# Patient Record
Sex: MALE | Race: WHITE | NOT HISPANIC OR LATINO | Employment: OTHER | URBAN - METROPOLITAN AREA
[De-identification: names, ages, dates, MRNs, and addresses within clinical notes are randomized per-mention and may not be internally consistent; named-entity substitution may affect disease eponyms.]

---

## 2017-03-31 RX ORDER — ESCITALOPRAM OXALATE 20 MG/1
TABLET ORAL
Qty: 30 TABLET | Refills: 0 | Status: SHIPPED | OUTPATIENT
Start: 2017-03-31 | End: 2017-04-27 | Stop reason: SDUPTHER

## 2017-04-27 RX ORDER — ESCITALOPRAM OXALATE 20 MG/1
TABLET ORAL
Qty: 30 TABLET | Refills: 0 | Status: SHIPPED | OUTPATIENT
Start: 2017-04-27 | End: 2017-08-22 | Stop reason: SDUPTHER

## 2017-04-27 RX ORDER — ESCITALOPRAM OXALATE 20 MG/1
TABLET ORAL
Qty: 15 TABLET | Refills: 0 | Status: SHIPPED | OUTPATIENT
Start: 2017-04-27 | End: 2017-04-27 | Stop reason: SDUPTHER

## 2017-08-01 ENCOUNTER — TELEPHONE (OUTPATIENT)
Dept: FAMILY MEDICINE CLINIC | Facility: CLINIC | Age: 77
End: 2017-08-01

## 2017-08-01 NOTE — TELEPHONE ENCOUNTER
Pt says he received bill for $100+ and says billing dept cannot help him. Iesha gave him the number earlier and pt called back saying they told him we have to file with medicare.    I tried to explain to him that if the billing dept could not help,he would need to speak with you.     [phone]

## 2017-08-22 RX ORDER — ESCITALOPRAM OXALATE 20 MG/1
TABLET ORAL
Qty: 15 TABLET | Refills: 0 | Status: SHIPPED | OUTPATIENT
Start: 2017-08-22 | End: 2017-09-28 | Stop reason: SDUPTHER

## 2017-09-28 RX ORDER — ESCITALOPRAM OXALATE 20 MG/1
TABLET ORAL
Qty: 5 TABLET | Refills: 0 | Status: SHIPPED | OUTPATIENT
Start: 2017-09-28 | End: 2017-10-04 | Stop reason: SDUPTHER

## 2017-10-02 RX ORDER — LISINOPRIL 20 MG/1
TABLET ORAL
Qty: 30 TABLET | Refills: 0 | Status: SHIPPED | OUTPATIENT
Start: 2017-10-02 | End: 2017-10-04 | Stop reason: SDUPTHER

## 2017-10-04 ENCOUNTER — OFFICE VISIT (OUTPATIENT)
Dept: FAMILY MEDICINE CLINIC | Facility: CLINIC | Age: 77
End: 2017-10-04

## 2017-10-04 VITALS
RESPIRATION RATE: 15 BRPM | TEMPERATURE: 98.4 F | WEIGHT: 186 LBS | BODY MASS INDEX: 26.63 KG/M2 | HEART RATE: 69 BPM | SYSTOLIC BLOOD PRESSURE: 118 MMHG | DIASTOLIC BLOOD PRESSURE: 60 MMHG | HEIGHT: 70 IN

## 2017-10-04 DIAGNOSIS — I10 BENIGN ESSENTIAL HYPERTENSION: Primary | ICD-10-CM

## 2017-10-04 DIAGNOSIS — Z23 NEED FOR IMMUNIZATION AGAINST INFLUENZA: ICD-10-CM

## 2017-10-04 DIAGNOSIS — F41.9 ANXIETY DISORDER, UNSPECIFIED TYPE: ICD-10-CM

## 2017-10-04 PROCEDURE — G0008 ADMIN INFLUENZA VIRUS VAC: HCPCS | Performed by: INTERNAL MEDICINE

## 2017-10-04 PROCEDURE — 99213 OFFICE O/P EST LOW 20 MIN: CPT | Performed by: INTERNAL MEDICINE

## 2017-10-04 RX ORDER — LISINOPRIL 20 MG/1
20 TABLET ORAL DAILY
Qty: 90 TABLET | Refills: 1 | Status: SHIPPED | OUTPATIENT
Start: 2017-10-04 | End: 2018-05-02 | Stop reason: SDUPTHER

## 2017-10-04 RX ORDER — ESCITALOPRAM OXALATE 20 MG/1
20 TABLET ORAL DAILY
Qty: 90 TABLET | Refills: 1 | Status: SHIPPED | OUTPATIENT
Start: 2017-10-04 | End: 2017-12-13

## 2017-10-04 NOTE — PROGRESS NOTES
Subjective chief complaint is follow-up for blood pressure and anxiety  Jewel Best is a 77 y.o. male.     History of Present Illness Jewel is here today for follow-up.  He does have hypertension.  Typically 20 mg of lisinopril has controlled his blood pressure fairly well.  He has been cutting back on his Lexapro.  He takes a whole tablet one day and a half tablets neck.  His anxiety seems to be well-controlled and he is not requiring any lorazepam.  Well most of the time.  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  Previously he had a fistula from his gums out to the skin.  This seems to have closed.     he has had some mild hyperlipidemia in the past.  He is not on any medication for this.  He also is had some prior anemia.     The following portions of the patient's history were reviewed and updated as appropriate: allergies, current medications, past medical history and problem list.    Review of Systems   Respiratory: Negative for chest tightness and shortness of breath.    Cardiovascular: Negative for chest pain and leg swelling.   Gastrointestinal: Negative for abdominal pain.   Neurological: Negative for dizziness, light-headedness and headaches.       Objective   Physical Exam   Constitutional: He appears well-developed and well-nourished.   Neck: Carotid bruit is not present. No thyromegaly present.   Cardiovascular: Normal rate, regular rhythm, normal heart sounds and intact distal pulses.  Exam reveals no gallop and no friction rub.    No murmur heard.  Pulmonary/Chest: Effort normal and breath sounds normal. No respiratory distress. He has no wheezes. He has no rales.   Abdominal: Soft. Bowel sounds are normal. He exhibits no distension. There is no tenderness. There is no rebound and no guarding.   Musculoskeletal: He exhibits no edema.   Nursing note and vitals reviewed.      Assessment/Plan   Jewel was seen today for med management.    Diagnoses and all orders for this visit:    Benign essential  hypertension  -     Comprehensive Metabolic Panel  -     Lipid Panel  -     CBC & Differential    Anxiety disorder, unspecified type    Need for immunization against influenza  -     Flu Vaccine High Dose PF 65YR+    Other orders  -     escitalopram (LEXAPRO) 20 MG tablet; Take 1 tablet by mouth Daily.  -     lisinopril (PRINIVIL,ZESTRIL) 20 MG tablet; Take 1 tablet by mouth Daily.       Young is here today for follow-up.  We did renew his medications for 90 day supplies with refill.  We are going to give him a flu shot today and check some lab work.  We will make allowances for him not fasting.

## 2017-10-05 LAB
ALBUMIN SERPL-MCNC: 4.3 G/DL (ref 3.5–4.8)
ALBUMIN/GLOB SERPL: 1.7 {RATIO} (ref 1.2–2.2)
ALP SERPL-CCNC: 63 IU/L (ref 39–117)
ALT SERPL-CCNC: 12 IU/L (ref 0–44)
AST SERPL-CCNC: 17 IU/L (ref 0–40)
BASOPHILS # BLD AUTO: 0 X10E3/UL (ref 0–0.2)
BASOPHILS NFR BLD AUTO: 0 %
BILIRUB SERPL-MCNC: 0.4 MG/DL (ref 0–1.2)
BUN SERPL-MCNC: 17 MG/DL (ref 8–27)
BUN/CREAT SERPL: 15 (ref 10–24)
CALCIUM SERPL-MCNC: 8.9 MG/DL (ref 8.6–10.2)
CHLORIDE SERPL-SCNC: 101 MMOL/L (ref 96–106)
CHOLEST SERPL-MCNC: 190 MG/DL (ref 100–199)
CO2 SERPL-SCNC: 24 MMOL/L (ref 18–29)
CREAT SERPL-MCNC: 1.14 MG/DL (ref 0.76–1.27)
EOSINOPHIL # BLD AUTO: 0.3 X10E3/UL (ref 0–0.4)
EOSINOPHIL NFR BLD AUTO: 4 %
ERYTHROCYTE [DISTWIDTH] IN BLOOD BY AUTOMATED COUNT: 13.5 % (ref 12.3–15.4)
GLOBULIN SER CALC-MCNC: 2.5 G/DL (ref 1.5–4.5)
GLUCOSE SERPL-MCNC: 85 MG/DL (ref 65–99)
HCT VFR BLD AUTO: 35.1 % (ref 37.5–51)
HDLC SERPL-MCNC: 43 MG/DL
HGB BLD-MCNC: 11.7 G/DL (ref 12.6–17.7)
IMM GRANULOCYTES # BLD: 0 X10E3/UL (ref 0–0.1)
IMM GRANULOCYTES NFR BLD: 0 %
INTERPRETATION: NORMAL
LDLC SERPL CALC-MCNC: 111 MG/DL (ref 0–99)
LYMPHOCYTES # BLD AUTO: 2.1 X10E3/UL (ref 0.7–3.1)
LYMPHOCYTES NFR BLD AUTO: 31 %
MCH RBC QN AUTO: 30.5 PG (ref 26.6–33)
MCHC RBC AUTO-ENTMCNC: 33.3 G/DL (ref 31.5–35.7)
MCV RBC AUTO: 91 FL (ref 79–97)
MONOCYTES # BLD AUTO: 0.4 X10E3/UL (ref 0.1–0.9)
MONOCYTES NFR BLD AUTO: 6 %
NEUTROPHILS # BLD AUTO: 4.1 X10E3/UL (ref 1.4–7)
NEUTROPHILS NFR BLD AUTO: 59 %
PLATELET # BLD AUTO: 281 X10E3/UL (ref 150–379)
POTASSIUM SERPL-SCNC: 4.7 MMOL/L (ref 3.5–5.2)
PROT SERPL-MCNC: 6.8 G/DL (ref 6–8.5)
RBC # BLD AUTO: 3.84 X10E6/UL (ref 4.14–5.8)
SODIUM SERPL-SCNC: 139 MMOL/L (ref 134–144)
TRIGL SERPL-MCNC: 179 MG/DL (ref 0–149)
VLDLC SERPL CALC-MCNC: 36 MG/DL (ref 5–40)
WBC # BLD AUTO: 6.9 X10E3/UL (ref 3.4–10.8)

## 2017-10-11 ENCOUNTER — TELEPHONE (OUTPATIENT)
Dept: FAMILY MEDICINE CLINIC | Facility: CLINIC | Age: 77
End: 2017-10-11

## 2017-10-11 DIAGNOSIS — D64.9 ANEMIA, UNSPECIFIED TYPE: Primary | ICD-10-CM

## 2017-10-11 NOTE — TELEPHONE ENCOUNTER
----- Message from Stephanie Mahmood sent at 10/10/2017 10:44 AM EDT -----  PT IS RETURNING YOUR CALL, [phone]  Results were given to the patient.  I did advise repeating his blood count in one month.

## 2017-11-14 DIAGNOSIS — D64.9 CHRONIC ANEMIA: Primary | ICD-10-CM

## 2017-11-14 LAB
BASOPHILS # BLD AUTO: 0.04 10*3/MM3 (ref 0–0.2)
BASOPHILS NFR BLD AUTO: 0.5 % (ref 0–1.5)
EOSINOPHIL # BLD AUTO: 0.41 10*3/MM3 (ref 0–0.7)
EOSINOPHIL NFR BLD AUTO: 5.1 % (ref 0.3–6.2)
ERYTHROCYTE [DISTWIDTH] IN BLOOD BY AUTOMATED COUNT: 13.4 % (ref 11.5–14.5)
FERRITIN SERPL-MCNC: 300.1 NG/ML (ref 30–400)
HCT VFR BLD AUTO: 38.1 % (ref 40.4–52.2)
HGB BLD-MCNC: 12.3 G/DL (ref 13.7–17.6)
IMM GRANULOCYTES # BLD: 0.02 10*3/MM3 (ref 0–0.03)
IMM GRANULOCYTES NFR BLD: 0.2 % (ref 0–0.5)
IRON SATN MFR SERPL: 26 % (ref 20–50)
IRON SERPL-MCNC: 98 MCG/DL (ref 59–158)
LYMPHOCYTES # BLD AUTO: 2.84 10*3/MM3 (ref 0.9–4.8)
LYMPHOCYTES NFR BLD AUTO: 35.3 % (ref 19.6–45.3)
MCH RBC QN AUTO: 31.4 PG (ref 27–32.7)
MCHC RBC AUTO-ENTMCNC: 32.3 G/DL (ref 32.6–36.4)
MCV RBC AUTO: 97.2 FL (ref 79.8–96.2)
MONOCYTES # BLD AUTO: 0.55 10*3/MM3 (ref 0.2–1.2)
MONOCYTES NFR BLD AUTO: 6.8 % (ref 5–12)
NEUTROPHILS # BLD AUTO: 4.18 10*3/MM3 (ref 1.9–8.1)
NEUTROPHILS NFR BLD AUTO: 52.1 % (ref 42.7–76)
PLATELET # BLD AUTO: 293 10*3/MM3 (ref 140–500)
RBC # BLD AUTO: 3.92 10*6/MM3 (ref 4.6–6)
TIBC SERPL-MCNC: 376 MCG/DL
UIBC SERPL-MCNC: 278 MCG/DL
VIT B12 SERPL-MCNC: 231 PG/ML (ref 211–946)
WBC # BLD AUTO: 8.04 10*3/MM3 (ref 4.5–10.7)

## 2017-11-15 ENCOUNTER — RESULTS ENCOUNTER (OUTPATIENT)
Dept: FAMILY MEDICINE CLINIC | Facility: CLINIC | Age: 77
End: 2017-11-15

## 2017-11-15 DIAGNOSIS — D64.9 ANEMIA, UNSPECIFIED TYPE: ICD-10-CM

## 2017-12-13 ENCOUNTER — OFFICE VISIT (OUTPATIENT)
Dept: GASTROENTEROLOGY | Facility: CLINIC | Age: 77
End: 2017-12-13

## 2017-12-13 VITALS
SYSTOLIC BLOOD PRESSURE: 116 MMHG | HEIGHT: 71 IN | WEIGHT: 192 LBS | BODY MASS INDEX: 26.88 KG/M2 | DIASTOLIC BLOOD PRESSURE: 66 MMHG

## 2017-12-13 DIAGNOSIS — D64.9 ANEMIA, UNSPECIFIED TYPE: Primary | ICD-10-CM

## 2017-12-13 PROCEDURE — 99213 OFFICE O/P EST LOW 20 MIN: CPT | Performed by: INTERNAL MEDICINE

## 2017-12-13 NOTE — PROGRESS NOTES
Chief Complaint   Patient presents with   • Anemia        Jewel Best is a  77 y.o. male here for a follow up visit for Anemia    HPI this 77-year-old white male patient of Dr. Jose Cruz Panda was last seen in this office October 2015.  At that time he had episodes of hematemesis and upper endoscopy had revealed gastric polyps with evidence of bleeding.  Polyps were removed and his symptoms subsided.  He had carried a history of anemia at that point in time.  Review of recent labs reflects very mild anemia, iron studies are within normal limits.  The patient believes he was referred at this time for screening evaluation of his colon.  We discussed screening for colorectal cancer and he has no high risk issues, specifically, family history is negative for colorectal cancer and also negative for history of polyps.  He denies any melena or bright red blood per rectum.  I reviewed current standards for screening purposes.  He wishes to discuss this further with his primary care tender before considering colonoscopic evaluation for screening purposes.    Past Medical History:   Diagnosis Date   • Anemia    • Anxiety    • Bladder cancer    • Hyperlipidemia    • Hypertension    • Lymphoma        Current Outpatient Prescriptions   Medication Sig Dispense Refill   • lisinopril (PRINIVIL,ZESTRIL) 20 MG tablet Take 1 tablet by mouth Daily. 90 tablet 1     No current facility-administered medications for this visit.        PRN Meds:.    No Known Allergies    Social History     Social History   • Marital status:      Spouse name: N/A   • Number of children: N/A   • Years of education: N/A     Occupational History   • Not on file.     Social History Main Topics   • Smoking status: Former Smoker     Quit date: 12/13/1971   • Smokeless tobacco: Never Used   • Alcohol use No   • Drug use: Not on file   • Sexual activity: Not on file     Other Topics Concern   • Not on file     Social History Narrative   • No narrative on  file       Family History   Problem Relation Age of Onset   • Family history unknown: Yes       Review of Systems   Constitutional: Negative for activity change, appetite change, fatigue and unexpected weight change.   HENT: Negative for congestion, facial swelling, sore throat, trouble swallowing and voice change.    Eyes: Negative for photophobia and visual disturbance.   Respiratory: Negative for cough and choking.    Cardiovascular: Negative for chest pain.   Gastrointestinal: Negative for abdominal distention, abdominal pain, anal bleeding, blood in stool, constipation, diarrhea, nausea, rectal pain and vomiting.   Endocrine: Negative for polyphagia.   Musculoskeletal: Negative for arthralgias, gait problem and joint swelling.   Skin: Negative for color change, pallor and rash.   Allergic/Immunologic: Negative for food allergies.   Neurological: Negative for speech difficulty and headaches.   Hematological: Does not bruise/bleed easily.   Psychiatric/Behavioral: Negative for agitation, confusion and sleep disturbance.       Vitals:    12/13/17 0758   BP: 116/66       Physical Exam   Constitutional: He is oriented to person, place, and time. He appears well-developed and well-nourished.   HENT:   Head: Normocephalic.   Mouth/Throat: Oropharynx is clear and moist.   Eyes: Conjunctivae and EOM are normal.   Neck: Normal range of motion.   Cardiovascular: Normal rate and regular rhythm.    Pulmonary/Chest: Breath sounds normal.   Abdominal: Soft. Bowel sounds are normal.   Musculoskeletal: Normal range of motion.   Neurological: He is alert and oriented to person, place, and time.   Skin: Skin is warm and dry.   Psychiatric: He has a normal mood and affect. His behavior is normal.       ASSESSMENT   #1 anemia: Static with normal iron indices      PLAN  Patient to discuss screening evaluation with his primary care tender, will call if he wishes to proceed.  Otherwise can follow-up as needed for any GI related  complaints      ICD-10-CM ICD-9-CM   1. Anemia, unspecified type D64.9 285.9

## 2018-05-02 RX ORDER — LISINOPRIL 20 MG/1
20 TABLET ORAL DAILY
Qty: 30 TABLET | Refills: 0 | Status: SHIPPED | OUTPATIENT
Start: 2018-05-02 | End: 2018-05-30 | Stop reason: SDUPTHER

## 2018-05-30 ENCOUNTER — OFFICE VISIT (OUTPATIENT)
Dept: FAMILY MEDICINE CLINIC | Facility: CLINIC | Age: 78
End: 2018-05-30

## 2018-05-30 VITALS
RESPIRATION RATE: 16 BRPM | BODY MASS INDEX: 26.4 KG/M2 | WEIGHT: 188.6 LBS | DIASTOLIC BLOOD PRESSURE: 70 MMHG | HEART RATE: 61 BPM | OXYGEN SATURATION: 97 % | HEIGHT: 71 IN | SYSTOLIC BLOOD PRESSURE: 138 MMHG | TEMPERATURE: 98.5 F

## 2018-05-30 DIAGNOSIS — I10 BENIGN ESSENTIAL HYPERTENSION: Primary | ICD-10-CM

## 2018-05-30 DIAGNOSIS — E78.49 OTHER HYPERLIPIDEMIA: ICD-10-CM

## 2018-05-30 LAB
ALBUMIN SERPL-MCNC: 4.4 G/DL (ref 3.5–5.2)
ALBUMIN/GLOB SERPL: 1.7 G/DL
ALP SERPL-CCNC: 67 U/L (ref 39–117)
ALT SERPL-CCNC: 11 U/L (ref 1–41)
AST SERPL-CCNC: 11 U/L (ref 1–40)
BASOPHILS # BLD AUTO: 0.02 10*3/MM3 (ref 0–0.2)
BASOPHILS NFR BLD AUTO: 0.3 % (ref 0–1.5)
BILIRUB SERPL-MCNC: 0.4 MG/DL (ref 0.1–1.2)
BUN SERPL-MCNC: 18 MG/DL (ref 8–23)
BUN/CREAT SERPL: 14.8 (ref 7–25)
CALCIUM SERPL-MCNC: 9.5 MG/DL (ref 8.6–10.5)
CHLORIDE SERPL-SCNC: 103 MMOL/L (ref 98–107)
CHOLEST SERPL-MCNC: 197 MG/DL (ref 0–200)
CO2 SERPL-SCNC: 26.7 MMOL/L (ref 22–29)
CREAT SERPL-MCNC: 1.22 MG/DL (ref 0.76–1.27)
EOSINOPHIL # BLD AUTO: 0.33 10*3/MM3 (ref 0–0.7)
EOSINOPHIL NFR BLD AUTO: 4.5 % (ref 0.3–6.2)
ERYTHROCYTE [DISTWIDTH] IN BLOOD BY AUTOMATED COUNT: 12.9 % (ref 11.5–14.5)
GFR SERPLBLD CREATININE-BSD FMLA CKD-EPI: 57 ML/MIN/1.73
GFR SERPLBLD CREATININE-BSD FMLA CKD-EPI: 69 ML/MIN/1.73
GLOBULIN SER CALC-MCNC: 2.6 GM/DL
GLUCOSE SERPL-MCNC: 100 MG/DL (ref 65–99)
HCT VFR BLD AUTO: 38.3 % (ref 40.4–52.2)
HDLC SERPL-MCNC: 43 MG/DL (ref 40–60)
HGB BLD-MCNC: 12 G/DL (ref 13.7–17.6)
IMM GRANULOCYTES # BLD: 0 10*3/MM3 (ref 0–0.03)
IMM GRANULOCYTES NFR BLD: 0 % (ref 0–0.5)
LDLC SERPL CALC-MCNC: 131 MG/DL (ref 0–100)
LYMPHOCYTES # BLD AUTO: 2.3 10*3/MM3 (ref 0.9–4.8)
LYMPHOCYTES NFR BLD AUTO: 31.6 % (ref 19.6–45.3)
MCH RBC QN AUTO: 30.5 PG (ref 27–32.7)
MCHC RBC AUTO-ENTMCNC: 31.3 G/DL (ref 32.6–36.4)
MCV RBC AUTO: 97.2 FL (ref 79.8–96.2)
MONOCYTES # BLD AUTO: 0.55 10*3/MM3 (ref 0.2–1.2)
MONOCYTES NFR BLD AUTO: 7.5 % (ref 5–12)
NEUTROPHILS # BLD AUTO: 4.09 10*3/MM3 (ref 1.9–8.1)
NEUTROPHILS NFR BLD AUTO: 56.1 % (ref 42.7–76)
PLATELET # BLD AUTO: 311 10*3/MM3 (ref 140–500)
POTASSIUM SERPL-SCNC: 5 MMOL/L (ref 3.5–5.2)
PROT SERPL-MCNC: 7 G/DL (ref 6–8.5)
RBC # BLD AUTO: 3.94 10*6/MM3 (ref 4.6–6)
SODIUM SERPL-SCNC: 142 MMOL/L (ref 136–145)
TRIGL SERPL-MCNC: 113 MG/DL (ref 0–150)
VLDLC SERPL CALC-MCNC: 22.6 MG/DL (ref 5–40)
WBC # BLD AUTO: 7.29 10*3/MM3 (ref 4.5–10.7)

## 2018-05-30 PROCEDURE — 99213 OFFICE O/P EST LOW 20 MIN: CPT | Performed by: INTERNAL MEDICINE

## 2018-05-30 RX ORDER — LISINOPRIL 20 MG/1
20 TABLET ORAL DAILY
Qty: 90 TABLET | Refills: 1 | Status: SHIPPED | OUTPATIENT
Start: 2018-05-30 | End: 2018-12-03 | Stop reason: SDUPTHER

## 2018-05-30 RX ORDER — ESCITALOPRAM OXALATE 20 MG/1
20 TABLET ORAL DAILY
Qty: 90 TABLET | Refills: 1 | Status: SHIPPED | OUTPATIENT
Start: 2018-05-30 | End: 2018-12-03 | Stop reason: SDUPTHER

## 2018-05-30 RX ORDER — ESCITALOPRAM OXALATE 20 MG/1
20 TABLET ORAL DAILY
Refills: 1 | COMMUNITY
Start: 2018-02-28 | End: 2018-05-30 | Stop reason: SDUPTHER

## 2018-05-30 NOTE — PROGRESS NOTES
Subjective chief complaint is medication refill for blood pressure  Jewel Best is a 77 y.o. male.     History of Present Illness   Jewel is here today for checkup on his blood pressure.  He does take lisinopril 20 mg daily.  His blood pressure has been adequately controlled with this.  He has had some mild hyperlipidemia but has chosen not to take any medications for this.  He does continue take Lexapro for depression and anxiety over his previous health conditions.  He reports no new problems today.  The following portions of the patient's history were reviewed and updated as appropriate: allergies, current medications, past medical history and problem list.    Review of Systems   Constitutional: Negative for activity change and appetite change.   Respiratory: Negative for chest tightness and shortness of breath.    Cardiovascular: Negative for chest pain.   Gastrointestinal: Negative for abdominal pain and blood in stool.   Neurological: Negative for dizziness, light-headedness and headache.       Objective   Physical Exam   Constitutional: He appears well-developed and well-nourished.   Neck: Carotid bruit is not present.   Cardiovascular: Normal rate, regular rhythm, normal heart sounds and intact distal pulses.  Exam reveals no gallop and no friction rub.    No murmur heard.  Pulmonary/Chest: Effort normal and breath sounds normal. No respiratory distress. He has no wheezes. He has no rales.   Abdominal: Soft. Bowel sounds are normal. He exhibits no distension and no mass. There is no tenderness. There is no guarding.   Musculoskeletal: He exhibits no edema.   Nursing note and vitals reviewed.        Assessment/Plan   Jewel was seen today for med refill.    Diagnoses and all orders for this visit:    Benign essential hypertension  -     CBC & Differential  -     Comprehensive Metabolic Panel    Other hyperlipidemia  -     Lipid Panel  -     Comprehensive Metabolic Panel    Other orders  -     lisinopril  (PRINIVIL,ZESTRIL) 20 MG tablet; Take 1 tablet by mouth Daily.  -     escitalopram (LEXAPRO) 20 MG tablet; Take 1 tablet by mouth Daily.        Young is here today for follow-up.  His blood pressure seems to be doing well.  We are going to check some appropriate lab work here today.  His medications have been renewed.  If all is well we will see him back in 6 months.

## 2018-07-30 ENCOUNTER — HOSPITAL ENCOUNTER (EMERGENCY)
Facility: HOSPITAL | Age: 78
Discharge: HOME | End: 2018-07-30
Attending: EMERGENCY MEDICINE | Admitting: EMERGENCY MEDICINE

## 2018-07-30 VITALS
SYSTOLIC BLOOD PRESSURE: 163 MMHG | HEIGHT: 71 IN | TEMPERATURE: 97.3 F | RESPIRATION RATE: 18 BRPM | WEIGHT: 180 LBS | HEART RATE: 67 BPM | OXYGEN SATURATION: 98 % | DIASTOLIC BLOOD PRESSURE: 71 MMHG | BODY MASS INDEX: 25.2 KG/M2
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
## 2018-07-30 DIAGNOSIS — S61.011A LACERATION OF RIGHT THUMB WITHOUT FOREIGN BODY, NAIL DAMAGE STATUS UNSPECIFIED, INITIAL ENCOUNTER: Primary | ICD-10-CM

## 2018-07-30 PROCEDURE — 90471 IMMUNIZATION ADMIN: CPT | Performed by: PHYSICIAN ASSISTANT

## 2018-07-30 PROCEDURE — 25010000002 TDAP 5-2.5-18.5 LF-MCG/0.5 SUSPENSION: Performed by: PHYSICIAN ASSISTANT

## 2018-07-30 PROCEDURE — 90715 TDAP VACCINE 7 YRS/> IM: CPT | Performed by: PHYSICIAN ASSISTANT

## 2018-07-30 PROCEDURE — 99282 EMERGENCY DEPT VISIT SF MDM: CPT

## 2018-07-30 RX ORDER — LIDOCAINE HYDROCHLORIDE 10 MG/ML
10 INJECTION, SOLUTION INFILTRATION; PERINEURAL ONCE
Status: COMPLETED | OUTPATIENT
Start: 2018-07-30 | End: 2018-07-30

## 2018-07-30 RX ADMIN — TETANUS TOXOID, REDUCED DIPHTHERIA TOXOID AND ACELLULAR PERTUSSIS VACCINE, ADSORBED 0.5 ML: 5; 2.5; 8; 8; 2.5 SUSPENSION INTRAMUSCULAR at 21:48

## 2018-07-30 RX ADMIN — LIDOCAINE HYDROCHLORIDE 10 ML: 10 INJECTION, SOLUTION INFILTRATION; PERINEURAL at 21:47

## 2018-07-31 ENCOUNTER — EPISODE CHANGES (OUTPATIENT)
Dept: CASE MANAGEMENT | Facility: OTHER | Age: 78
End: 2018-07-31

## 2018-08-01 ENCOUNTER — TELEPHONE (OUTPATIENT)
Dept: SOCIAL WORK | Facility: HOSPITAL | Age: 78
End: 2018-08-01

## 2018-08-01 NOTE — TELEPHONE ENCOUNTER
ED f/u phone call: states that laceration has no signs of infection, and he is following d/c instructions. Per question, advised to clean and apply antibx ointment as directed until sutures removed ten days after visit. No questions/concerns

## 2018-10-10 ENCOUNTER — EPISODE CHANGES (OUTPATIENT)
Dept: CASE MANAGEMENT | Facility: OTHER | Age: 78
End: 2018-10-10

## 2018-12-03 RX ORDER — LISINOPRIL 20 MG/1
20 TABLET ORAL DAILY
Qty: 90 TABLET | Refills: 0 | Status: SHIPPED | OUTPATIENT
Start: 2018-12-03 | End: 2019-03-20 | Stop reason: SDUPTHER

## 2018-12-03 RX ORDER — ESCITALOPRAM OXALATE 20 MG/1
20 TABLET ORAL DAILY
Qty: 90 TABLET | Refills: 1 | Status: SHIPPED | OUTPATIENT
Start: 2018-12-03 | End: 2019-05-29 | Stop reason: SDUPTHER

## 2019-03-20 RX ORDER — LISINOPRIL 20 MG/1
20 TABLET ORAL DAILY
Qty: 90 TABLET | Refills: 0 | Status: SHIPPED | OUTPATIENT
Start: 2019-03-20 | End: 2019-03-21 | Stop reason: SDUPTHER

## 2019-03-21 ENCOUNTER — OFFICE VISIT (OUTPATIENT)
Dept: FAMILY MEDICINE CLINIC | Facility: CLINIC | Age: 79
End: 2019-03-21

## 2019-03-21 VITALS
OXYGEN SATURATION: 98 % | WEIGHT: 190.6 LBS | HEIGHT: 71 IN | HEART RATE: 77 BPM | SYSTOLIC BLOOD PRESSURE: 120 MMHG | DIASTOLIC BLOOD PRESSURE: 80 MMHG | BODY MASS INDEX: 26.68 KG/M2 | TEMPERATURE: 97.8 F

## 2019-03-21 DIAGNOSIS — I10 BENIGN ESSENTIAL HYPERTENSION: Primary | ICD-10-CM

## 2019-03-21 DIAGNOSIS — F41.9 ANXIETY DISORDER, UNSPECIFIED TYPE: ICD-10-CM

## 2019-03-21 DIAGNOSIS — D64.9 ANEMIA, UNSPECIFIED TYPE: ICD-10-CM

## 2019-03-21 PROCEDURE — 99213 OFFICE O/P EST LOW 20 MIN: CPT | Performed by: INTERNAL MEDICINE

## 2019-03-21 RX ORDER — LISINOPRIL 20 MG/1
20 TABLET ORAL DAILY
Qty: 90 TABLET | Refills: 1 | Status: SHIPPED | OUTPATIENT
Start: 2019-03-21 | End: 2019-05-29 | Stop reason: SDUPTHER

## 2019-03-21 NOTE — PROGRESS NOTES
Subjective Plan is follow-up on hypertension and anxiety  Jewel Best is a 78 y.o. male.     History of Present Illness   Young is here today for follow-up.  He does have hypertension.  He is on 20 mg of lisinopril.  He reports that his blood pressures at home vary from 113 systolic to 165 systolic.  The diastolic normally stays about the same.  He is not having any symptoms from this.  He does have some anxiety.  He is wondering whether he still needs the 20 mg of Lexapro per day.  He does have some leftover lorazepam from 4 years ago that he takes now and then.  The following portions of the patient's history were reviewed and updated as appropriate: allergies, current medications, past family history, past medical history, past social history, past surgical history and problem list.    Review of Systems   Respiratory: Negative for chest tightness and shortness of breath.    Cardiovascular: Negative for chest pain.   Psychiatric/Behavioral: The patient is nervous/anxious.        Objective   Physical Exam   Constitutional: He appears well-developed and well-nourished.   Neck: Carotid bruit is not present.   Cardiovascular: Normal rate, regular rhythm, normal heart sounds and intact distal pulses. Exam reveals no gallop and no friction rub.   No murmur heard.  Pulmonary/Chest: Effort normal and breath sounds normal. No respiratory distress. He has no wheezes. He has no rales.   Abdominal: Soft. Bowel sounds are normal. He exhibits no distension and no mass. There is no tenderness. There is no guarding.   Musculoskeletal: He exhibits no edema.   Nursing note and vitals reviewed.        Assessment/Plan   Jewel was seen today for hypertension and med refill.    Diagnoses and all orders for this visit:    Benign essential hypertension  -     Comprehensive Metabolic Panel    Anxiety disorder, unspecified type    Anemia, unspecified type  -     CBC & Differential    Other orders  -     lisinopril (PRINIVIL,ZESTRIL)  20 MG tablet; Take 1 tablet by mouth Daily. NEEDS APPT    Jewel is here today for follow-up.  We are going to check some laboratories to look at his creatinine and also make sure his anemia is stable.  If all is well we can see him back in 6 months.

## 2019-03-22 LAB
ALBUMIN SERPL-MCNC: 4.5 G/DL (ref 3.5–4.7)
ALBUMIN/GLOB SERPL: 1.7 {RATIO} (ref 1.2–2.2)
ALP SERPL-CCNC: 69 IU/L (ref 39–117)
ALT SERPL-CCNC: 13 IU/L (ref 0–44)
AST SERPL-CCNC: 17 IU/L (ref 0–40)
BASOPHILS # BLD AUTO: 0 X10E3/UL (ref 0–0.2)
BASOPHILS NFR BLD AUTO: 0 %
BILIRUB SERPL-MCNC: 0.3 MG/DL (ref 0–1.2)
BUN SERPL-MCNC: 19 MG/DL (ref 8–27)
BUN/CREAT SERPL: 16 (ref 10–24)
CALCIUM SERPL-MCNC: 9.2 MG/DL (ref 8.6–10.2)
CHLORIDE SERPL-SCNC: 104 MMOL/L (ref 96–106)
CO2 SERPL-SCNC: 24 MMOL/L (ref 20–29)
CREAT SERPL-MCNC: 1.16 MG/DL (ref 0.76–1.27)
EOSINOPHIL # BLD AUTO: 0.3 X10E3/UL (ref 0–0.4)
EOSINOPHIL NFR BLD AUTO: 4 %
ERYTHROCYTE [DISTWIDTH] IN BLOOD BY AUTOMATED COUNT: 13.5 % (ref 12.3–15.4)
GLOBULIN SER CALC-MCNC: 2.6 G/DL (ref 1.5–4.5)
GLUCOSE SERPL-MCNC: 133 MG/DL (ref 65–99)
HCT VFR BLD AUTO: 36.2 % (ref 37.5–51)
HGB BLD-MCNC: 12.2 G/DL (ref 13–17.7)
IMM GRANULOCYTES # BLD AUTO: 0 X10E3/UL (ref 0–0.1)
IMM GRANULOCYTES NFR BLD AUTO: 0 %
LYMPHOCYTES # BLD AUTO: 2.4 X10E3/UL (ref 0.7–3.1)
LYMPHOCYTES NFR BLD AUTO: 28 %
MCH RBC QN AUTO: 30.4 PG (ref 26.6–33)
MCHC RBC AUTO-ENTMCNC: 33.7 G/DL (ref 31.5–35.7)
MCV RBC AUTO: 90 FL (ref 79–97)
MONOCYTES # BLD AUTO: 0.5 X10E3/UL (ref 0.1–0.9)
MONOCYTES NFR BLD AUTO: 6 %
NEUTROPHILS # BLD AUTO: 5.1 X10E3/UL (ref 1.4–7)
NEUTROPHILS NFR BLD AUTO: 62 %
PLATELET # BLD AUTO: 308 X10E3/UL (ref 150–379)
POTASSIUM SERPL-SCNC: 4.2 MMOL/L (ref 3.5–5.2)
PROT SERPL-MCNC: 7.1 G/DL (ref 6–8.5)
RBC # BLD AUTO: 4.01 X10E6/UL (ref 4.14–5.8)
SODIUM SERPL-SCNC: 144 MMOL/L (ref 134–144)
WBC # BLD AUTO: 8.3 X10E3/UL (ref 3.4–10.8)

## 2019-05-29 ENCOUNTER — OFFICE VISIT (OUTPATIENT)
Dept: FAMILY MEDICINE CLINIC | Facility: CLINIC | Age: 79
End: 2019-05-29

## 2019-05-29 VITALS
DIASTOLIC BLOOD PRESSURE: 82 MMHG | HEART RATE: 67 BPM | WEIGHT: 182.2 LBS | TEMPERATURE: 97.7 F | OXYGEN SATURATION: 97 % | BODY MASS INDEX: 25.51 KG/M2 | HEIGHT: 71 IN | SYSTOLIC BLOOD PRESSURE: 146 MMHG

## 2019-05-29 DIAGNOSIS — F41.9 ANXIETY DISORDER, UNSPECIFIED TYPE: Primary | ICD-10-CM

## 2019-05-29 PROCEDURE — 99214 OFFICE O/P EST MOD 30 MIN: CPT | Performed by: INTERNAL MEDICINE

## 2019-05-29 RX ORDER — LISINOPRIL 20 MG/1
20 TABLET ORAL DAILY
Qty: 90 TABLET | Refills: 1 | Status: SHIPPED | OUTPATIENT
Start: 2019-05-29 | End: 2020-08-10

## 2019-05-29 RX ORDER — ESCITALOPRAM OXALATE 20 MG/1
20 TABLET ORAL DAILY
Qty: 90 TABLET | Refills: 1 | Status: SHIPPED | OUTPATIENT
Start: 2019-05-29 | End: 2019-12-09 | Stop reason: SDUPTHER

## 2019-05-29 RX ORDER — BUSPIRONE HYDROCHLORIDE 5 MG/1
5 TABLET ORAL 3 TIMES DAILY
Qty: 90 TABLET | Refills: 5 | Status: SHIPPED | OUTPATIENT
Start: 2019-05-29 | End: 2019-05-29

## 2019-05-29 RX ORDER — BUSPIRONE HYDROCHLORIDE 5 MG/1
5 TABLET ORAL 3 TIMES DAILY
Qty: 90 TABLET | Refills: 5 | Status: SHIPPED | OUTPATIENT
Start: 2019-05-29 | End: 2020-08-10

## 2019-05-29 NOTE — PROGRESS NOTES
Subjective Chief complaint is anxiety  Jewel Best is a 78 y.o. male.     History of Present Illness   Jewel is here today for complaints of anxiety for several weeks.  He is already on some Lexapro which tends to help with his depression but is not helping to curtail his anxiety.  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  He does not want to sell the business because he lives in the space above it.  The business and space are paid for.  He has lived there for 25 years.  Past medical history is remarkable for hypertension which is currently well controlled.  The following portions of the patient's history were reviewed and updated as appropriate: allergies, current medications, past family history, past medical history, past social history, past surgical history and problem list.    Review of Systems   Respiratory: Negative for chest tightness and shortness of breath.    Cardiovascular: Negative for chest pain.   Psychiatric/Behavioral: The patient is nervous/anxious.        Objective   Physical Exam   Constitutional: He is oriented to person, place, and time. He appears well-developed and well-nourished.   Cardiovascular: Normal rate, regular rhythm and normal heart sounds.   Pulmonary/Chest: Effort normal and breath sounds normal.   Musculoskeletal: He exhibits no edema.   Neurological: He is alert and oriented to person, place, and time.   Psychiatric: He has a normal mood and affect.   Nursing note and vitals reviewed.        Assessment/Plan   Jewel was seen today for depression and med refill.    Diagnoses and all orders for this visit:    Anxiety disorder, unspecified type    Other orders  -     escitalopram (LEXAPRO) 20 MG tablet; Take 1 tablet by mouth Daily.  -     Discontinue: busPIRone (BUSPAR) 5 MG tablet; Take 1 tablet by mouth 3 (Three) Times a Day.  -     busPIRone (BUSPAR) 5 MG tablet; Take 1 tablet by mouth 3 (Three)  Times a Day.      Jewel is here today for increased anxiety.  I am going to add some BuSpar to his Lexapro.  We will have him call in 2 weeks and let me know how he is doing.

## 2019-12-10 RX ORDER — ESCITALOPRAM OXALATE 20 MG/1
20 TABLET ORAL DAILY
Qty: 90 TABLET | Refills: 0 | Status: SHIPPED | OUTPATIENT
Start: 2019-12-10 | End: 2020-03-16

## 2019-12-16 ENCOUNTER — OFFICE VISIT (OUTPATIENT)
Dept: FAMILY MEDICINE CLINIC | Facility: CLINIC | Age: 79
End: 2019-12-16

## 2019-12-16 VITALS
BODY MASS INDEX: 27.3 KG/M2 | DIASTOLIC BLOOD PRESSURE: 70 MMHG | WEIGHT: 195 LBS | TEMPERATURE: 97.5 F | HEIGHT: 71 IN | SYSTOLIC BLOOD PRESSURE: 120 MMHG | OXYGEN SATURATION: 98 % | HEART RATE: 78 BPM

## 2019-12-16 DIAGNOSIS — L60.0 INGROWN NAIL OF GREAT TOE OF RIGHT FOOT: Primary | ICD-10-CM

## 2019-12-16 PROCEDURE — 99213 OFFICE O/P EST LOW 20 MIN: CPT | Performed by: INTERNAL MEDICINE

## 2019-12-16 NOTE — PROGRESS NOTES
Subjective Complaint is a sore great toe  Jewel Best is a 79 y.o. male.     History of Present Illness   Jewel is here today for sore great toe.  This been present for approximately 3 weeks.  The pain is not associated with the joint.  It is at the base of the nailbed.  It is worse when he is walking.  He does walk about 6 miles per day.  The following portions of the patient's history were reviewed and updated as appropriate: allergies, current medications, past family history, past medical history, past social history, past surgical history and problem list.    Review of Systems   Constitutional: Negative for chills and fever.   Skin: Positive for color change.       Objective   Physical Exam   Cardiovascular: Intact distal pulses.   Skin:   There is a onychomycotic ingrown nail at the right great toe.  There is some erythema at the cuticle.  There is no palpable fluctuance.  The PIP and MTP joint are not tender.   Nursing note and vitals reviewed.        Assessment/Plan   Jewel was seen today for pain.    Diagnoses and all orders for this visit:    Ingrown nail of great toe of right foot  -     Ambulatory Referral to Podiatry    Jewel is here today for pain in his toe.  I believe this is due to an ingrown toenail.  This nail is also mycotic.  I did advise referral to a podiatrist.

## 2020-03-16 RX ORDER — ESCITALOPRAM OXALATE 20 MG/1
20 TABLET ORAL DAILY
Qty: 90 TABLET | Refills: 0 | Status: SHIPPED | OUTPATIENT
Start: 2020-03-16 | End: 2020-06-24

## 2020-06-24 RX ORDER — ESCITALOPRAM OXALATE 20 MG/1
20 TABLET ORAL DAILY
Qty: 90 TABLET | Refills: 0 | Status: SHIPPED | OUTPATIENT
Start: 2020-06-24 | End: 2020-08-10 | Stop reason: SDUPTHER

## 2020-08-10 ENCOUNTER — OFFICE VISIT (OUTPATIENT)
Dept: FAMILY MEDICINE CLINIC | Facility: CLINIC | Age: 80
End: 2020-08-10

## 2020-08-10 VITALS
OXYGEN SATURATION: 97 % | TEMPERATURE: 99.1 F | WEIGHT: 193 LBS | DIASTOLIC BLOOD PRESSURE: 80 MMHG | HEIGHT: 71 IN | SYSTOLIC BLOOD PRESSURE: 140 MMHG | HEART RATE: 75 BPM | BODY MASS INDEX: 27.02 KG/M2

## 2020-08-10 DIAGNOSIS — H92.01 RIGHT EAR PAIN: Primary | ICD-10-CM

## 2020-08-10 PROCEDURE — 99214 OFFICE O/P EST MOD 30 MIN: CPT | Performed by: INTERNAL MEDICINE

## 2020-08-10 RX ORDER — ESCITALOPRAM OXALATE 20 MG/1
20 TABLET ORAL DAILY
Qty: 90 TABLET | Refills: 1 | Status: SHIPPED | OUTPATIENT
Start: 2020-08-10 | End: 2020-09-25 | Stop reason: SDUPTHER

## 2020-08-10 NOTE — PROGRESS NOTES
Subjective  Answers for HPI/ROS submitted by the patient on 8/8/2020   Ear pain  What is the primary reason for your visit?: Ear Pain    Jewel Best is a 80 y.o. male.  Chief complaint is right ear pain  History of Present Illness Jewel is here today for right ear pain.  He has been having this on and off for a few months.  He is not having fever or chills.  It is not necessarily painful to bite.  The worst the pain is been has been an 8 on a scale of 10.  Sometimes Advil helps.  Tylenol does not help.  It has not affected his hearing.  He is not having ringing in the ears.  He is not dizzy.    The following portions of the patient's history were reviewed and updated as appropriate: allergies, current medications, past family history, past medical history, past social history, past surgical history and problem list.    Review of Systems   HENT: Positive for ear pain. Negative for ear discharge, hearing loss, rhinorrhea and sore throat.    Respiratory: Negative for cough.    Gastrointestinal: Negative for abdominal pain, diarrhea and vomiting.   Musculoskeletal: Negative for neck pain.   Skin: Negative for rash.       Objective   Physical Exam   Constitutional: He appears well-developed and well-nourished.   HENT:   The right tympanic membrane is normal.  The external auditory canal is clear.  The TMJ is not tender when palpated through the canal.  There is some bilateral nasal congestion but no significant erythema.  Oropharynx is clear.   Lymphadenopathy:     He has no cervical adenopathy.   Nursing note and vitals reviewed.        Assessment/Plan   Jewel was seen today for earache.    Diagnoses and all orders for this visit:    Right ear pain  -     Ambulatory Referral to ENT (Otolaryngology)    Other orders  -     escitalopram (LEXAPRO) 20 MG tablet; Take 1 tablet by mouth Daily.  -     diclofenac (VOLTAREN) 50 MG EC tablet; Take 1 tablet by mouth 2 (Two) Times a Day.      Jewel is here today for right ear  pain.  I do not see any evidence of infection.  I am going to treat him with some diclofenac.  I am going to refer him to an ear nose and throat doctor but I think this is going to be more of a referred pain either from the neck or the jaw

## 2020-09-25 ENCOUNTER — OFFICE VISIT (OUTPATIENT)
Dept: FAMILY MEDICINE CLINIC | Facility: CLINIC | Age: 80
End: 2020-09-25

## 2020-09-25 VITALS
DIASTOLIC BLOOD PRESSURE: 80 MMHG | HEART RATE: 78 BPM | OXYGEN SATURATION: 98 % | BODY MASS INDEX: 26.23 KG/M2 | SYSTOLIC BLOOD PRESSURE: 120 MMHG | WEIGHT: 187.4 LBS | HEIGHT: 71 IN | TEMPERATURE: 97.1 F

## 2020-09-25 DIAGNOSIS — H34.10 CENTRAL RETINAL ARTERY OCCLUSION, UNSPECIFIED LATERALITY: Primary | ICD-10-CM

## 2020-09-25 PROCEDURE — 99213 OFFICE O/P EST LOW 20 MIN: CPT | Performed by: INTERNAL MEDICINE

## 2020-09-25 RX ORDER — ESCITALOPRAM OXALATE 20 MG/1
20 TABLET ORAL DAILY
Qty: 90 TABLET | Refills: 1 | Status: SHIPPED | OUTPATIENT
Start: 2020-09-25 | End: 2021-10-25

## 2020-09-25 RX ORDER — ESCITALOPRAM OXALATE 20 MG/1
20 TABLET ORAL DAILY
COMMUNITY
Start: 2020-08-10 | End: 2020-09-25 | Stop reason: SDUPTHER
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
## 2021-03-02 DIAGNOSIS — Z23 IMMUNIZATION DUE: ICD-10-CM

## 2021-09-17 ENCOUNTER — OFFICE VISIT (OUTPATIENT)
Dept: FAMILY MEDICINE CLINIC | Facility: CLINIC | Age: 81
End: 2021-09-17

## 2021-09-17 VITALS
HEART RATE: 60 BPM | HEIGHT: 71 IN | DIASTOLIC BLOOD PRESSURE: 80 MMHG | WEIGHT: 184 LBS | TEMPERATURE: 97.8 F | SYSTOLIC BLOOD PRESSURE: 150 MMHG | BODY MASS INDEX: 25.76 KG/M2 | OXYGEN SATURATION: 97 %

## 2021-09-17 DIAGNOSIS — M16.10 HIP ARTHRITIS: Primary | ICD-10-CM

## 2021-09-17 PROCEDURE — 99213 OFFICE O/P EST LOW 20 MIN: CPT | Performed by: INTERNAL MEDICINE

## 2021-09-17 NOTE — PROGRESS NOTES
Subjective Complaint is pain in his hip and foot  Jewel Best is a 81 y.o. male.     History of Present Illness Jewel is here today for complaints of pain in his right hip and foot.  The right hip pain seems to be the bigger problem.  He does walk approximately 5 miles per day despite the pain.  When he walks he walks the same route every time so that one leg is down one leg is up.  I believe that is why he is getting some of his right ankle pain.    The following portions of the patient's history were reviewed and updated as appropriate: allergies, current medications, past family history, past medical history, past social history, past surgical history and problem list.    Review of Systems   Constitutional: Negative for chills and fever.   Respiratory: Negative for cough.    Musculoskeletal: Positive for gait problem.       Objective   Physical Exam  Vitals and nursing note reviewed.   Cardiovascular:      Rate and Rhythm: Normal rate.   Pulmonary:      Effort: Pulmonary effort is normal.   Musculoskeletal:      Right lower leg: No edema.      Left lower leg: No edema.      Comments: He has limited internal and external rotation of the right hip.  Left hip does little bit better.  There is some mild tenderness to the tibial tendons on the right ankle.  Also the ligaments below the medial malleolus are tender.           Assessment/Plan   Diagnoses and all orders for this visit:    1. Hip arthritis (Primary)  -     XR Hip With or Without Pelvis 2 - 3 View Right; Future    Jewel is here today for hip and ankle pain.  His hip seems to be some primary osteoarthritis with limited range of motion.  I think his ankle is more of a tendinitis from always walking the same route.  I did encourage him to alter his route and use ice and heat for the ankle.  We are going to x-ray the hip.  I think he is going to have some bone-on-bone problems there.

## 2021-10-25 RX ORDER — ESCITALOPRAM OXALATE 20 MG/1
20 TABLET ORAL DAILY
Qty: 90 TABLET | Refills: 1 | Status: SHIPPED | OUTPATIENT
Start: 2021-10-25 | End: 2021-11-15 | Stop reason: ALTCHOICE

## 2021-11-15 ENCOUNTER — OFFICE VISIT (OUTPATIENT)
Dept: FAMILY MEDICINE CLINIC | Facility: CLINIC | Age: 81
End: 2021-11-15

## 2021-11-15 VITALS
WEIGHT: 186 LBS | HEART RATE: 71 BPM | OXYGEN SATURATION: 95 % | SYSTOLIC BLOOD PRESSURE: 180 MMHG | BODY MASS INDEX: 26.04 KG/M2 | DIASTOLIC BLOOD PRESSURE: 94 MMHG | HEIGHT: 71 IN

## 2021-11-15 DIAGNOSIS — F32.9 REACTIVE DEPRESSION: Primary | ICD-10-CM

## 2021-11-15 DIAGNOSIS — F41.9 ANXIETY DISORDER, UNSPECIFIED TYPE: ICD-10-CM

## 2021-11-15 DIAGNOSIS — E53.8 LOW VITAMIN B12 LEVEL: ICD-10-CM

## 2021-11-15 DIAGNOSIS — I10 ESSENTIAL (PRIMARY) HYPERTENSION: ICD-10-CM

## 2021-11-15 PROCEDURE — 99214 OFFICE O/P EST MOD 30 MIN: CPT | Performed by: INTERNAL MEDICINE

## 2021-11-15 RX ORDER — BUSPIRONE HYDROCHLORIDE 5 MG/1
5 TABLET ORAL 3 TIMES DAILY
Qty: 90 TABLET | Refills: 5 | Status: SHIPPED | OUTPATIENT
Start: 2021-11-15 | End: 2022-10-05 | Stop reason: SDUPTHER

## 2021-11-15 RX ORDER — LISINOPRIL 20 MG/1
20 TABLET ORAL DAILY
Qty: 30 TABLET | Refills: 5 | Status: SHIPPED | OUTPATIENT
Start: 2021-11-15 | End: 2022-06-09

## 2021-11-15 NOTE — PROGRESS NOTES
Subjective Chief complaint is depression and anxiety  Jewel Best is a 81 y.o. male.     History of Present Illness Jewel is here today for complaints of depression and anxiety. He has been on Lexapro for some time at a maximum dose. Typically that is helped things. He reports that approximately 3 weeks ago things just became worse. He has been sleeping a little more than usual. He has no interest in doing things. His appetite seems to be off. He feels more nervous and anxious than usual. He really does not have a reason for this. He reports there are no family or financial issues going on at the present time. Today his blood pressure was elevated more than usual. I did retake it at 160/100.  He has not had any lab work done in some time. He did have a borderline low vitamin B12 level in the past. Is been several years since we checked thyroid tests.  The following portions of the patient's history were reviewed and updated as appropriate: allergies, current medications, past family history, past medical history, past social history, past surgical history and problem list.    Review of Systems   Constitutional: Positive for activity change and appetite change. Negative for chills and fever.        He was walking several miles per day. However 3 weeks ago when this started he basically stopped walking.   Respiratory: Negative for chest tightness and shortness of breath.    Cardiovascular: Negative for chest pain.   Gastrointestinal: Positive for constipation.   Psychiatric/Behavioral: Positive for dysphoric mood, sleep disturbance and depressed mood. The patient is nervous/anxious.        Objective   Physical Exam  Vitals and nursing note reviewed.   Constitutional:       Appearance: Normal appearance.   Eyes:      General: No scleral icterus.  Neck:      Thyroid: No thyromegaly.      Vascular: No carotid bruit.   Cardiovascular:      Rate and Rhythm: Normal rate and regular rhythm.      Heart sounds: No murmur  heard.  No friction rub. No gallop.    Pulmonary:      Effort: Pulmonary effort is normal.      Breath sounds: No wheezing, rhonchi or rales.   Abdominal:      General: Bowel sounds are normal.      Palpations: Abdomen is soft.      Tenderness: There is abdominal tenderness.      Comments: He has some tenderness to deep palpation in the midepigastrium but no rebound or guarding   Musculoskeletal:      Right lower leg: No edema.      Left lower leg: No edema.   Lymphadenopathy:      Cervical: No cervical adenopathy.   Neurological:      Mental Status: He is alert.           Assessment/Plan   Diagnoses and all orders for this visit:    1. Reactive depression (Primary)  -     CBC & Differential  -     Comprehensive Metabolic Panel  -     TSH+Free T4  -     Vitamin D 25 Hydroxy    2. Anxiety disorder, unspecified type  -     Comprehensive Metabolic Panel  -     TSH+Free T4    3. Low vitamin B12 level  -     CBC & Differential  -     Comprehensive Metabolic Panel  -     Methylmalonic Acid, Serum  -     Folate  -     Vitamin D 25 Hydroxy    4. Body mass index (BMI) 30.0-30.9, adult   -     Vitamin D 25 Hydroxy    5. Essential (primary) hypertension    Other orders  -     sertraline (Zoloft) 50 MG tablet; Take 1 tablet by mouth Daily.  Dispense: 30 tablet; Refill: 5  -     busPIRone (BUSPAR) 5 MG tablet; Take 1 tablet by mouth 3 (Three) Times a Day.  Dispense: 90 tablet; Refill: 5  -     lisinopril (PRINIVIL,ZESTRIL) 20 MG tablet; Take 1 tablet by mouth Daily.  Dispense: 30 tablet; Refill: 5      Jewel is here today for depressive symptoms with anxiety. I cannot really come up with a good cause for. His blood pressure is also elevated. Years ago he was on some lisinopril and he tolerated that. We are going to change his E citalopram to sertraline. I did advise him to take half a tablet on the sertraline for a week and then stop it. He can then start the Zoloft at the 50 mg dose. We are going to have him do some BuSpar now  5 mg 3 times daily and add lisinopril back in for his blood pressure. I will see him back in 3 to 4 weeks

## 2021-11-16 LAB — 25(OH)D3+25(OH)D2 SERPL-MCNC: 29.4 NG/ML (ref 30–100)

## 2021-11-19 ENCOUNTER — TELEPHONE (OUTPATIENT)
Dept: FAMILY MEDICINE CLINIC | Facility: CLINIC | Age: 81
End: 2021-11-19

## 2021-11-19 NOTE — TELEPHONE ENCOUNTER
Caller: Jewel Best    Relationship to patient: Self    Best call back number: [phone]    Patient is needing: PATIENT CALLED IN AND WANTED TO DISCUSS AN ISSUE WITH HIS BOWEL MOVEMENTS/ TESTING THAT WAS DONE ON Monday. HE SAID HE HAS SOME CONCERNS. PLEASE CALL PATIENT AND ADVISE      I advised that his other lab results are pending.  I did advise to use some MiraLAX for the constipation.  If he does not get any results with that he can try milk of magnesia with a large glass of water.  He currently is not uncomfortable.

## 2021-11-23 ENCOUNTER — TELEPHONE (OUTPATIENT)
Dept: FAMILY MEDICINE CLINIC | Facility: CLINIC | Age: 81
End: 2021-11-23

## 2021-11-23 NOTE — TELEPHONE ENCOUNTER
Caller: Jewel Best    Relationship: Self    Best call back number: [phone]     What is the medical concern/diagnosis: UNABLE TO HAVE BOWEL MOVEMENT     What specialty or service is being requested: COLONOSCOPY    What is the provider, practice or medical service name: ANY    What is the office location: ANY    What is the office phone number: ANY    Any additional details: THE ONLY TIME THE PATIENT IS ABLE TO HAVE A BOWEL MOVEMENT IS WHEN HE HAS TAKEN LAXATIVES

## 2021-11-24 ENCOUNTER — OFFICE VISIT (OUTPATIENT)
Dept: FAMILY MEDICINE CLINIC | Facility: CLINIC | Age: 81
End: 2021-11-24

## 2021-11-24 VITALS
BODY MASS INDEX: 25.48 KG/M2 | WEIGHT: 182 LBS | HEART RATE: 97 BPM | OXYGEN SATURATION: 96 % | SYSTOLIC BLOOD PRESSURE: 120 MMHG | DIASTOLIC BLOOD PRESSURE: 68 MMHG | HEIGHT: 71 IN

## 2021-11-24 DIAGNOSIS — Z12.5 SCREENING PSA (PROSTATE SPECIFIC ANTIGEN): ICD-10-CM

## 2021-11-24 DIAGNOSIS — F41.9 ANXIETY DISORDER, UNSPECIFIED TYPE: ICD-10-CM

## 2021-11-24 DIAGNOSIS — R42 DIZZINESS: ICD-10-CM

## 2021-11-24 DIAGNOSIS — I10 BENIGN ESSENTIAL HYPERTENSION: Primary | ICD-10-CM

## 2021-11-24 DIAGNOSIS — R19.4 ENCOUNTER FOR DIAGNOSTIC COLONOSCOPY DUE TO CHANGE IN BOWEL HABITS: ICD-10-CM

## 2021-11-24 DIAGNOSIS — D64.9 ANEMIA, UNSPECIFIED TYPE: ICD-10-CM

## 2021-11-24 DIAGNOSIS — R19.4 CHANGE IN BOWEL HABITS: ICD-10-CM

## 2021-11-24 PROCEDURE — 99214 OFFICE O/P EST MOD 30 MIN: CPT | Performed by: INTERNAL MEDICINE

## 2021-11-24 NOTE — PROGRESS NOTES
Subjective Chief complaint is anxiety and possible colon cancer  Jewel Best is a 81 y.o. male.     History of Present Illness Jewel is here today for anxiety.  At his last visit we weaned him off of the generic Lexapro and started him on some sertraline.  He does report these had a little bit of dizziness.  He said the dizziness is not really his big worry.  His blood pressure was also elevated last time we started some lisinopril and that is much better.  His big concern is that he has colon cancer.  He is concerned because for the last 5 weeks he has not been able to have a bowel movement without using some sort of laxative.  He is not having blood in his bowel movements.  His weight is down approximately 4 pounds since his last visit but only 1 pound since September.  Past medical history is remarkable for lymphoma that is currently in remission  The following portions of the patient's history were reviewed and updated as appropriate: allergies, current medications, past family history, past medical history, past social history, past surgical history and problem list.    Review of Systems   Constitutional: Positive for appetite change. Negative for chills and fever.   Gastrointestinal: Positive for constipation. Negative for abdominal pain, blood in stool and diarrhea.   Neurological: Positive for dizziness.   Psychiatric/Behavioral: The patient is nervous/anxious.        Objective   Physical Exam  Constitutional:       Appearance: Normal appearance.   Eyes:      General: No scleral icterus.     Conjunctiva/sclera: Conjunctivae normal.   Neck:      Thyroid: No thyromegaly.   Cardiovascular:      Rate and Rhythm: Normal rate and regular rhythm.      Pulses: Normal pulses.   Pulmonary:      Effort: Pulmonary effort is normal.      Breath sounds: No wheezing, rhonchi or rales.   Abdominal:      General: Bowel sounds are normal.      Palpations: Abdomen is soft. There is no mass.      Tenderness: There is no  abdominal tenderness. There is no guarding or rebound.   Musculoskeletal:      Right lower leg: No edema.      Left lower leg: No edema.   Lymphadenopathy:      Cervical: No cervical adenopathy.   Neurological:      Mental Status: He is alert.           Assessment/Plan   Diagnoses and all orders for this visit:    1. Benign essential hypertension (Primary)  -     CBC & Differential  -     Comprehensive Metabolic Panel    2. Anxiety disorder, unspecified type    3. Change in bowel habits  -     CBC & Differential  -     TSH+Free T4  -     Ambulatory Referral to Gastroenterology  -     PSA Screen    4. Dizziness  -     CBC & Differential  -     Comprehensive Metabolic Panel    5. Anemia, unspecified type  -     CBC & Differential  -     TSH+Free T4  -     Vitamin B12  -     Folate  -     PSA Screen    6. Encounter for diagnostic colonoscopy due to change in bowel habits  -     Ambulatory Referral to Gastroenterology    7. Screening PSA (prostate specific antigen)  -     PSA Screen      Jewel is here today for symptoms of anxiety.  It seems that he is considerably worried that he has a colon cancer.  I am going to refer him for a diagnostic colonoscopy due to change in bowel habits.  I did explain to him that this will not be a quick process.  I am going to get some lab work done on him that was inadvertently not drawn at his last visit.  We will discuss this after the holiday weekend.  For now I do not think his dizziness is coming from the medication.  We will continue that as it is.

## 2021-11-24 NOTE — TELEPHONE ENCOUNTER
PATIENT CALLED, STATING THAT NEEDS TO SPEAK TO DR [place] ABOUT HIS MEDICATIONS. AT FIRST HE SAID IT WAS AN EMERGENCY AND THEN HE SAID IT WAS NOT.  REQUESTED DR TO CALL HIM ASAP.  MADE ME ASSURE HIM THAT THE DOCTOR WILL CALL HIM, WHAT TIME IS THE DOCTOR GOING TO CALL.   I TOLD HIM I WILL RELAY THE MESSAGE AND THEN THE DOCTOR WILL CALL HIM WHEN HAS THE TIME IN BETWEEN PATIENTS OR AT THE END OF THE DAY    Discussed at his office visit today

## 2021-11-25 LAB
ALBUMIN SERPL-MCNC: 4.3 G/DL (ref 3.6–4.6)
ALBUMIN/GLOB SERPL: 1.7 {RATIO} (ref 1.2–2.2)
ALP SERPL-CCNC: 77 IU/L (ref 44–121)
ALT SERPL-CCNC: 10 IU/L (ref 0–44)
AST SERPL-CCNC: 15 IU/L (ref 0–40)
BASOPHILS # BLD AUTO: 0.1 X10E3/UL (ref 0–0.2)
BASOPHILS NFR BLD AUTO: 1 %
BILIRUB SERPL-MCNC: 0.5 MG/DL (ref 0–1.2)
BUN SERPL-MCNC: 15 MG/DL (ref 8–27)
BUN/CREAT SERPL: 12 (ref 10–24)
CALCIUM SERPL-MCNC: 9.1 MG/DL (ref 8.6–10.2)
CHLORIDE SERPL-SCNC: 102 MMOL/L (ref 96–106)
CO2 SERPL-SCNC: 26 MMOL/L (ref 20–29)
CREAT SERPL-MCNC: 1.22 MG/DL (ref 0.76–1.27)
EOSINOPHIL # BLD AUTO: 0.1 X10E3/UL (ref 0–0.4)
EOSINOPHIL NFR BLD AUTO: 1 %
ERYTHROCYTE [DISTWIDTH] IN BLOOD BY AUTOMATED COUNT: 12.3 % (ref 11.6–15.4)
FOLATE SERPL-MCNC: 14.7 NG/ML
GLOBULIN SER CALC-MCNC: 2.6 G/DL (ref 1.5–4.5)
GLUCOSE SERPL-MCNC: 113 MG/DL (ref 65–99)
HCT VFR BLD AUTO: 40.4 % (ref 37.5–51)
HGB BLD-MCNC: 13.7 G/DL (ref 13–17.7)
IMM GRANULOCYTES # BLD AUTO: 0 X10E3/UL (ref 0–0.1)
IMM GRANULOCYTES NFR BLD AUTO: 0 %
LYMPHOCYTES # BLD AUTO: 1.7 X10E3/UL (ref 0.7–3.1)
LYMPHOCYTES NFR BLD AUTO: 21 %
MCH RBC QN AUTO: 30.9 PG (ref 26.6–33)
MCHC RBC AUTO-ENTMCNC: 33.9 G/DL (ref 31.5–35.7)
MCV RBC AUTO: 91 FL (ref 79–97)
MONOCYTES # BLD AUTO: 0.5 X10E3/UL (ref 0.1–0.9)
MONOCYTES NFR BLD AUTO: 6 %
NEUTROPHILS # BLD AUTO: 5.8 X10E3/UL (ref 1.4–7)
NEUTROPHILS NFR BLD AUTO: 71 %
PLATELET # BLD AUTO: 305 X10E3/UL (ref 150–450)
POTASSIUM SERPL-SCNC: 4.4 MMOL/L (ref 3.5–5.2)
PROT SERPL-MCNC: 6.9 G/DL (ref 6–8.5)
PSA SERPL-MCNC: 1 NG/ML (ref 0–4)
RBC # BLD AUTO: 4.43 X10E6/UL (ref 4.14–5.8)
SODIUM SERPL-SCNC: 142 MMOL/L (ref 134–144)
T4 FREE SERPL-MCNC: 1.05 NG/DL (ref 0.82–1.77)
TSH SERPL DL<=0.005 MIU/L-ACNC: 2.71 UIU/ML (ref 0.45–4.5)
VIT B12 SERPL-MCNC: 180 PG/ML (ref 232–1245)
WBC # BLD AUTO: 8.2 X10E3/UL (ref 3.4–10.8)

## 2021-11-27 LAB
ALBUMIN SERPL-MCNC: 4.4 G/DL (ref 3.6–4.6)
ALBUMIN/GLOB SERPL: 1.8 {RATIO} (ref 1.2–2.2)
ALP SERPL-CCNC: 74 IU/L (ref 44–121)
ALT SERPL-CCNC: 11 IU/L (ref 0–44)
AST SERPL-CCNC: 15 IU/L (ref 0–40)
BASOPHILS # BLD AUTO: 0 X10E3/UL (ref 0–0.2)
BASOPHILS NFR BLD AUTO: 0 %
BILIRUB SERPL-MCNC: 0.5 MG/DL (ref 0–1.2)
BUN SERPL-MCNC: 12 MG/DL (ref 8–27)
BUN/CREAT SERPL: 11 (ref 10–24)
CALCIUM SERPL-MCNC: 9.4 MG/DL (ref 8.6–10.2)
CHLORIDE SERPL-SCNC: 101 MMOL/L (ref 96–106)
CO2 SERPL-SCNC: 27 MMOL/L (ref 20–29)
CREAT SERPL-MCNC: 1.05 MG/DL (ref 0.76–1.27)
EOSINOPHIL # BLD AUTO: 0.2 X10E3/UL (ref 0–0.4)
EOSINOPHIL NFR BLD AUTO: 2 %
ERYTHROCYTE [DISTWIDTH] IN BLOOD BY AUTOMATED COUNT: 12.6 % (ref 11.6–15.4)
FOLATE SERPL-MCNC: 11.5 NG/ML
GLOBULIN SER CALC-MCNC: 2.5 G/DL (ref 1.5–4.5)
GLUCOSE SERPL-MCNC: 87 MG/DL (ref 65–99)
HCT VFR BLD AUTO: 43.7 % (ref 37.5–51)
HGB BLD-MCNC: 14.2 G/DL (ref 13–17.7)
IMM GRANULOCYTES # BLD AUTO: 0 X10E3/UL (ref 0–0.1)
IMM GRANULOCYTES NFR BLD AUTO: 0 %
LYMPHOCYTES # BLD AUTO: 2.2 X10E3/UL (ref 0.7–3.1)
LYMPHOCYTES NFR BLD AUTO: 28 %
Lab: ABNORMAL
MCH RBC QN AUTO: 29.6 PG (ref 26.6–33)
MCHC RBC AUTO-ENTMCNC: 32.5 G/DL (ref 31.5–35.7)
MCV RBC AUTO: 91 FL (ref 79–97)
METHYLMALONATE SERPL-SCNC: 787 NMOL/L (ref 0–378)
MONOCYTES # BLD AUTO: 0.5 X10E3/UL (ref 0.1–0.9)
MONOCYTES NFR BLD AUTO: 7 %
NEUTROPHILS # BLD AUTO: 5.1 X10E3/UL (ref 1.4–7)
NEUTROPHILS NFR BLD AUTO: 63 %
PLATELET # BLD AUTO: 325 X10E3/UL (ref 150–450)
POTASSIUM SERPL-SCNC: 4.8 MMOL/L (ref 3.5–5.2)
PROT SERPL-MCNC: 6.9 G/DL (ref 6–8.5)
RBC # BLD AUTO: 4.79 X10E6/UL (ref 4.14–5.8)
SODIUM SERPL-SCNC: 140 MMOL/L (ref 134–144)
T4 FREE SERPL-MCNC: 1.1 NG/DL (ref 0.82–1.77)
TSH SERPL DL<=0.005 MIU/L-ACNC: 4.5 UIU/ML (ref 0.45–4.5)
WBC # BLD AUTO: 8.1 X10E3/UL (ref 3.4–10.8)

## 2021-11-29 DIAGNOSIS — E53.8 VITAMIN B12 DEFICIENCY: Primary | ICD-10-CM

## 2021-11-29 RX ORDER — CYANOCOBALAMIN 1000 UG/ML
1000 INJECTION, SOLUTION INTRAMUSCULAR; SUBCUTANEOUS
Status: SHIPPED | OUTPATIENT
Start: 2022-01-29

## 2021-11-29 RX ORDER — CYANOCOBALAMIN 1000 UG/ML
1000 INJECTION, SOLUTION INTRAMUSCULAR; SUBCUTANEOUS WEEKLY
Status: COMPLETED | OUTPATIENT
Start: 2021-11-30 | End: 2021-12-27

## 2021-12-06 ENCOUNTER — CLINICAL SUPPORT (OUTPATIENT)
Dept: FAMILY MEDICINE CLINIC | Facility: CLINIC | Age: 81
End: 2021-12-06

## 2021-12-06 DIAGNOSIS — E53.8 VITAMIN B12 DEFICIENCY: Primary | ICD-10-CM

## 2021-12-06 PROCEDURE — 96372 THER/PROPH/DIAG INJ SC/IM: CPT | Performed by: INTERNAL MEDICINE

## 2021-12-06 RX ADMIN — CYANOCOBALAMIN 1000 MCG: 1000 INJECTION, SOLUTION INTRAMUSCULAR; SUBCUTANEOUS at 13:51

## 2021-12-08 ENCOUNTER — OFFICE VISIT (OUTPATIENT)
Dept: FAMILY MEDICINE CLINIC | Facility: CLINIC | Age: 81
End: 2021-12-08

## 2021-12-08 VITALS
BODY MASS INDEX: 25.06 KG/M2 | HEART RATE: 88 BPM | SYSTOLIC BLOOD PRESSURE: 106 MMHG | WEIGHT: 179 LBS | DIASTOLIC BLOOD PRESSURE: 68 MMHG | OXYGEN SATURATION: 96 % | HEIGHT: 71 IN

## 2021-12-08 DIAGNOSIS — F41.9 ANXIETY DISORDER, UNSPECIFIED TYPE: Primary | ICD-10-CM

## 2021-12-08 PROCEDURE — 99214 OFFICE O/P EST MOD 30 MIN: CPT | Performed by: INTERNAL MEDICINE

## 2021-12-08 RX ORDER — SERTRALINE HYDROCHLORIDE 100 MG/1
100 TABLET, FILM COATED ORAL DAILY
Qty: 30 TABLET | Refills: 5 | Status: SHIPPED | OUTPATIENT
Start: 2021-12-08 | End: 2022-10-05

## 2021-12-08 NOTE — PROGRESS NOTES
Subjective Chief complaint is follow-up on anxiety  Jewel Best is a 81 y.o. male.     History of Present Illness Jewel is here today for follow-up. Since his last visit he may feel a little bit less anxious. He has been using some fiber laxatives and his bowel movements have picked up. His blood pressure looks better today and his heart rate is better today. He still reports that he has no appetite. Although when he was sitting here today thought he might like to have a hamburger.    The following portions of the patient's history were reviewed and updated as appropriate: allergies, current medications, past family history, past medical history, past social history, past surgical history and problem list.    Review of Systems   Constitutional: Negative for chills and fever.   Psychiatric/Behavioral: Positive for sleep disturbance. Negative for depressed mood. The patient is nervous/anxious.        Objective   Physical Exam  Vitals and nursing note reviewed.   Constitutional:       Appearance: Normal appearance.   Cardiovascular:      Rate and Rhythm: Normal rate and regular rhythm.   Pulmonary:      Effort: Pulmonary effort is normal.      Breath sounds: Normal breath sounds. No wheezing, rhonchi or rales.   Neurological:      Mental Status: He is alert.           Assessment/Plan   Diagnoses and all orders for this visit:    1. Anxiety disorder, unspecified type (Primary)    Other orders  -     sertraline (Zoloft) 100 MG tablet; Take 1 tablet by mouth Daily.  Dispense: 30 tablet; Refill: 5    Jewel is here today for follow-up. I am going to increase his sertraline up to 100 mg daily. I am going to keep his BuSpar at 5 mg 3 times daily. He is going to contact me in 2 weeks. He is going to continue receiving vitamin B12 injections. Hopefully that will also help him feel better.

## 2021-12-15 ENCOUNTER — CLINICAL SUPPORT (OUTPATIENT)
Dept: FAMILY MEDICINE CLINIC | Facility: CLINIC | Age: 81
End: 2021-12-15

## 2021-12-15 DIAGNOSIS — E53.8 VITAMIN B12 DEFICIENCY: Primary | ICD-10-CM

## 2021-12-15 PROCEDURE — 96372 THER/PROPH/DIAG INJ SC/IM: CPT | Performed by: INTERNAL MEDICINE

## 2021-12-15 RX ORDER — CYANOCOBALAMIN 1000 UG/ML
1000 INJECTION, SOLUTION INTRAMUSCULAR; SUBCUTANEOUS
Qty: 10 ML | Refills: 0 | Status: SHIPPED | OUTPATIENT
Start: 2021-12-15

## 2021-12-15 RX ADMIN — CYANOCOBALAMIN 1000 MCG: 1000 INJECTION, SOLUTION INTRAMUSCULAR; SUBCUTANEOUS at 13:50

## 2021-12-22 ENCOUNTER — CLINICAL SUPPORT (OUTPATIENT)
Dept: FAMILY MEDICINE CLINIC | Facility: CLINIC | Age: 81
End: 2021-12-22

## 2021-12-22 DIAGNOSIS — E53.8 VITAMIN B12 DEFICIENCY: Primary | ICD-10-CM

## 2021-12-22 PROCEDURE — 96372 THER/PROPH/DIAG INJ SC/IM: CPT | Performed by: INTERNAL MEDICINE

## 2021-12-22 RX ADMIN — CYANOCOBALAMIN 1000 MCG: 1000 INJECTION, SOLUTION INTRAMUSCULAR; SUBCUTANEOUS at 13:59

## 2021-12-27 ENCOUNTER — OFFICE VISIT (OUTPATIENT)
Dept: FAMILY MEDICINE CLINIC | Facility: CLINIC | Age: 81
End: 2021-12-27

## 2021-12-27 VITALS
DIASTOLIC BLOOD PRESSURE: 60 MMHG | SYSTOLIC BLOOD PRESSURE: 110 MMHG | HEIGHT: 70 IN | BODY MASS INDEX: 25.62 KG/M2 | RESPIRATION RATE: 16 BRPM | WEIGHT: 179 LBS

## 2021-12-27 DIAGNOSIS — R05.8 NOCTURNAL COUGH: Primary | ICD-10-CM

## 2021-12-27 DIAGNOSIS — J40 BRONCHITIS: ICD-10-CM

## 2021-12-27 DIAGNOSIS — E53.8 VITAMIN B12 DEFICIENCY: ICD-10-CM

## 2021-12-27 PROCEDURE — 96372 THER/PROPH/DIAG INJ SC/IM: CPT | Performed by: INTERNAL MEDICINE

## 2021-12-27 PROCEDURE — 99214 OFFICE O/P EST MOD 30 MIN: CPT | Performed by: INTERNAL MEDICINE

## 2021-12-27 RX ORDER — DEXTROMETHORPHAN HYDROBROMIDE AND PROMETHAZINE HYDROCHLORIDE 15; 6.25 MG/5ML; MG/5ML
5 SYRUP ORAL 4 TIMES DAILY PRN
Qty: 180 ML | Refills: 1 | Status: SHIPPED | OUTPATIENT
Start: 2021-12-27 | End: 2022-06-09

## 2021-12-27 RX ORDER — PREDNISONE 20 MG/1
20 TABLET ORAL 2 TIMES DAILY
Qty: 10 TABLET | Refills: 0 | Status: SHIPPED | OUTPATIENT
Start: 2021-12-27 | End: 2022-02-17

## 2021-12-27 RX ORDER — DOXYCYCLINE HYCLATE 100 MG/1
100 CAPSULE ORAL 2 TIMES DAILY
Qty: 14 CAPSULE | Refills: 0 | Status: SHIPPED | OUTPATIENT
Start: 2021-12-27 | End: 2022-06-09

## 2021-12-27 RX ADMIN — CYANOCOBALAMIN 1000 MCG: 1000 INJECTION, SOLUTION INTRAMUSCULAR; SUBCUTANEOUS at 16:03

## 2021-12-27 NOTE — PROGRESS NOTES
Subjective Chief complaint is cough and fatigue  Jewel Best is a 81 y.o. male.     History of Present Illness Jewel is here today for complaints of cough.  This is been going on for 2 to 3 weeks.  It is keeping him awake at night.  He is feeling fatigued during the day.  He is also complaining of weight loss and loss of appetite.  His weight however has stabilized since we started him on new medication.  He does not feel like it is helping but his weight has leveled off.  He was found to be vitamin B12 deficient and we have initiated shots for that.  He is due for a B12 shot in 2 days we will administer early and then check some labs today.  We did check a number of labs on him in November.  They were mostly normal.  For the cough and weight loss I am going to get a chest x-ray I suspect majority of his symptoms are still going to be depression related.  If these medicines are not really doing the trick then he may need to see a psychiatrist.    The following portions of the patient's history were reviewed and updated as appropriate: allergies, current medications, past family history, past medical history, past social history, past surgical history and problem list.    Review of Systems   Constitutional: Positive for appetite change and fatigue. Negative for chills and fever.   Respiratory: Positive for cough. Negative for shortness of breath.    Gastrointestinal: Positive for nausea.       Objective   Physical Exam  Vitals and nursing note reviewed.   Constitutional:       General: He is not in acute distress.     Appearance: He is normal weight. He is not ill-appearing.   HENT:      Right Ear: Tympanic membrane and ear canal normal.      Left Ear: Ear canal normal.      Nose: Rhinorrhea present.      Mouth/Throat:      Mouth: Mucous membranes are moist.      Pharynx: Oropharynx is clear.   Cardiovascular:      Rate and Rhythm: Normal rate and regular rhythm.      Heart sounds: No murmur heard.  No gallop.     Pulmonary:      Effort: Pulmonary effort is normal.      Breath sounds: No wheezing, rhonchi or rales.   Lymphadenopathy:      Cervical: No cervical adenopathy.   Neurological:      Mental Status: He is alert.           Assessment/Plan   Diagnoses and all orders for this visit:    1. Nocturnal cough (Primary)  -     XR Chest PA & Lateral; Future    2. Bronchitis    3. Vitamin B12 deficiency  -     Vitamin B12  -     Methylmalonic Acid, Serum    Other orders  -     promethazine-dextromethorphan (PROMETHAZINE-DM) 6.25-15 MG/5ML syrup; Take 5 mL by mouth 4 (Four) Times a Day As Needed for Cough.  Dispense: 180 mL; Refill: 1  -     doxycycline (VIBRAMYCIN) 100 MG capsule; Take 1 capsule by mouth 2 (Two) Times a Day.  Dispense: 14 capsule; Refill: 0  -     predniSONE (DELTASONE) 20 MG tablet; Take 1 tablet by mouth 2 (Two) Times a Day.  Dispense: 10 tablet; Refill: 0      Jewel is here today for cough.  I suspect this is going to be postnasal drip related but since it has been going on for 2 weeks we are going to get a chest x-ray.  Going to treat him with some Phenergan DM for the cough.  We are going to give her some doxycycline for possible bronchitis.  Steroids may help him a little bit with his appetite.

## 2021-12-31 LAB
METHYLMALONATE SERPL-SCNC: 236 NMOL/L (ref 0–378)
VIT B12 SERPL-MCNC: >2000 PG/ML (ref 232–1245)

## 2022-02-17 ENCOUNTER — OFFICE VISIT (OUTPATIENT)
Dept: FAMILY MEDICINE CLINIC | Facility: CLINIC | Age: 82
End: 2022-02-17

## 2022-02-17 VITALS
BODY MASS INDEX: 25.62 KG/M2 | OXYGEN SATURATION: 98 % | DIASTOLIC BLOOD PRESSURE: 68 MMHG | SYSTOLIC BLOOD PRESSURE: 130 MMHG | HEART RATE: 87 BPM | HEIGHT: 70 IN | WEIGHT: 179 LBS

## 2022-02-17 DIAGNOSIS — M75.41 IMPINGEMENT SYNDROME OF RIGHT SHOULDER: ICD-10-CM

## 2022-02-17 DIAGNOSIS — M25.511 ACUTE PAIN OF RIGHT SHOULDER: Primary | ICD-10-CM

## 2022-02-17 PROCEDURE — 99213 OFFICE O/P EST LOW 20 MIN: CPT | Performed by: INTERNAL MEDICINE

## 2022-02-17 NOTE — PROGRESS NOTES
Subjective Complaint is right shoulder pain  Jewel Best is a 81 y.o. male.     History of Present Illness Jewel is here today for right shoulder pain.  This been present for about 3 weeks.  It seemed to occur after using an  to chip ice off the driveway.  The pain is a little bit better.  He has been using a massager on it.  He is not in continuous pain.    The following portions of the patient's history were reviewed and updated as appropriate: allergies, current medications, past family history, past medical history, past social history, past surgical history and problem list.    Review of Systems   Constitutional: Negative for chills and fever.   Respiratory: Negative for chest tightness.    Cardiovascular: Negative for chest pain.       Objective   Physical Exam  Vitals and nursing note reviewed.   Musculoskeletal:      Comments: There is no glenohumeral joint tenderness on the right.  There is no acromioclavicular joint tenderness.  There is no subdeltoid bursa tenderness or subacromial bursa tenderness.  He does have slight limitation of abduction.  External rotation does not exacerbate the pain.  His pain seems to be more located at the biceps tendon in the bicipital groove.           Assessment/Plan   Diagnoses and all orders for this visit:    1. Acute pain of right shoulder (Primary)    2. Impingement syndrome of right shoulder      Jewel is here today for some shoulder pain.  It is getting better.  I did advise him to use some Tylenol as well as ice.  I will have also supplied some exercises to do for the shoulder.

## 2022-02-17 NOTE — PATIENT INSTRUCTIONS
Shoulder Impingement Syndrome    Shoulder impingement syndrome is a condition that causes pain when connective tissues (tendons) surrounding the shoulder joint become pinched. These tendons are part of the group of muscles and tissues that help to stabilize the shoulder (rotator cuff). Beneath the rotator cuff is a fluid-filled sac (bursa) that allows the muscles and tendons to glide smoothly.  The bursa may become swollen or irritated (bursitis). Bursitis, swelling in the rotator cuff tendons, or both conditions can decrease how much space is under a bone in the shoulder joint (acromion), resulting in impingement.  What are the causes?  Shoulder impingement syndrome may be caused by bursitis or swelling of the rotator cuff tendons, which may result from:  · Repetitive overhead arm movements.  · Falling onto the shoulder.  · Weakness in the shoulder muscles.  What increases the risk?  You may be more likely to develop this condition if you:  · Play sports that involve throwing, such as baseball.  · Participate in sports such as tennis, volleyball, and swimming.  · Work as a , tan, or .  Some people are also more likely to develop impingement syndrome because of the shape of their acromion bone.  What are the signs or symptoms?  The main symptom of this condition is pain on the front or side of the shoulder. The pain may:  · Get worse when lifting or raising the arm.  · Get worse at night.  · Wake you up from sleeping.  · Feel sharp when the shoulder is moved and then fade to an ache.  Other symptoms may include:  · Tenderness.  · Stiffness.  · Inability to raise the arm above shoulder level or behind the body.  · Weakness.  How is this diagnosed?  This condition may be diagnosed based on:  · Your symptoms and medical history.  · A physical exam.  · Imaging tests, such as:  ? X-rays.  ? MRI.  ? Ultrasound.  How is this treated?  This condition may be treated by:  · Resting your shoulder and  avoiding all activities that cause pain or put stress on the shoulder.  · Icing your shoulder.  · NSAIDs to help reduce pain and swelling.  · One or more injections of medicines to numb the area and reduce inflammation.  · Physical therapy.  · Surgery. This may be needed if nonsurgical treatments have not helped. Surgery may involve repairing the rotator cuff, reshaping the acromion, or removing the bursa.  Follow these instructions at home:  Managing pain, stiffness, and swelling    · If directed, put ice on the injured area.  ? Put ice in a plastic bag.  ? Place a towel between your skin and the bag.  ? Leave the ice on for 20 minutes, 2-3 times a day.    Activity  · Rest and return to your normal activities as told by your health care provider. Ask your health care provider what activities are safe for you.  · Do exercises as told by your health care provider.  General instructions  · Do not use any products that contain nicotine or tobacco, such as cigarettes, e-cigarettes, and chewing tobacco. These can delay healing. If you need help quitting, ask your health care provider.  · Ask your health care provider when it is safe for you to drive.  · Take over-the-counter and prescription medicines only as told by your health care provider.  · Keep all follow-up visits as told by your health care provider. This is important.  How is this prevented?  · Give your body time to rest between periods of activity.  · Be safe and responsible while being active. This will help you avoid falls.  · Maintain physical fitness, including strength and flexibility.  Contact a health care provider if:  · Your symptoms have not improved after 1-2 months of treatment and rest.  · You cannot lift your arm away from your body.  Summary  · Shoulder impingement syndrome is a condition that causes pain when connective tissues (tendons) surrounding the shoulder joint become pinched.  · The main symptom of this condition is pain on the front  or side of the shoulder.  · This condition is usually treated with rest, ice, and pain medicines as needed.  This information is not intended to replace advice given to you by your health care provider. Make sure you discuss any questions you have with your health care provider.  Document Revised: 04/10/2020 Document Reviewed: 06/12/2019  [employer] Patient Education © 2021 [employer]  Shoulder Exercises  Ask your health care provider which exercises are safe for you. Do exercises exactly as told by your health care provider and adjust them as directed. It is normal to feel mild stretching, pulling, tightness, or discomfort as you do these exercises. Stop right away if you feel sudden pain or your pain gets worse. Do not begin these exercises until told by your health care provider.  Stretching exercises  External rotation and abduction  This exercise is sometimes called corner stretch. This exercise rotates your arm outward (external rotation) and moves your arm out from your body (abduction).  1.  a doorway with one of your feet slightly in front of the other. This is called a staggered stance. If you cannot reach your forearms to the door frame, stand facing a corner of a room.  2. Choose one of the following positions as told by your health care provider:  ? Place your hands and forearms on the door frame above your head.  ? Place your hands and forearms on the door frame at the height of your head.  ? Place your hands on the door frame at the height of your elbows.  3. Slowly move your weight onto your front foot until you feel a stretch across your chest and in the front of your shoulders. Keep your head and chest upright and keep your abdominal muscles tight.  4. Hold for __________ seconds.  5. To release the stretch, shift your weight to your back foot.  Repeat __________ times. Complete this exercise __________ times a day.  Extension, standing  1. Stand and hold a broomstick, a cane, or a similar  object behind your back.  ? Your hands should be a little wider than shoulder width apart.  ? Your palms should face away from your back.  2. Keeping your elbows straight and your shoulder muscles relaxed, move the stick away from your body until you feel a stretch in your shoulders (extension).  ? Avoid shrugging your shoulders while you move the stick. Keep your shoulder blades tucked down toward the middle of your back.  3. Hold for __________ seconds.  4. Slowly return to the starting position.  Repeat __________ times. Complete this exercise __________ times a day.  Range-of-motion exercises  Pendulum    1. Stand near a wall or a surface that you can hold onto for balance.  2. Bend at the waist and let your left / right arm hang straight down. Use your other arm to support you. Keep your back straight and do not lock your knees.  3. Relax your left / right arm and shoulder muscles, and move your hips and your trunk so your left / right arm swings freely. Your arm should swing because of the motion of your body, not because you are using your arm or shoulder muscles.  4. Keep moving your hips and trunk so your arm swings in the following directions, as told by your health care provider:  ? Side to side.  ? Forward and backward.  ? In clockwise and counterclockwise circles.  5. Continue each motion for __________ seconds, or for as long as told by your health care provider.  6. Slowly return to the starting position.  Repeat __________ times. Complete this exercise __________ times a day.  Shoulder flexion, standing    1. Stand and hold a broomstick, a cane, or a similar object. Place your hands a little more than shoulder width apart on the object. Your left / right hand should be palm up, and your other hand should be palm down.  2. Keep your elbow straight and your shoulder muscles relaxed. Push the stick up with your healthy arm to raise your left / right arm in front of your body, and then over your head  until you feel a stretch in your shoulder (flexion).  ? Avoid shrugging your shoulder while you raise your arm. Keep your shoulder blade tucked down toward the middle of your back.  3. Hold for __________ seconds.  4. Slowly return to the starting position.  Repeat __________ times. Complete this exercise __________ times a day.  Shoulder abduction, standing  1. Stand and hold a broomstick, a cane, or a similar object. Place your hands a little more than shoulder width apart on the object. Your left / right hand should be palm up, and your other hand should be palm down.  2. Keep your elbow straight and your shoulder muscles relaxed. Push the object across your body toward your left / right side. Raise your left / right arm to the side of your body (abduction) until you feel a stretch in your shoulder.  ? Do not raise your arm above shoulder height unless your health care provider tells you to do that.  ? If directed, raise your arm over your head.  ? Avoid shrugging your shoulder while you raise your arm. Keep your shoulder blade tucked down toward the middle of your back.  3. Hold for __________ seconds.  4. Slowly return to the starting position.  Repeat __________ times. Complete this exercise __________ times a day.  Internal rotation    1. Place your left / right hand behind your back, palm up.  2. Use your other hand to dangle an exercise band, a towel, or a similar object over your shoulder. Grasp the band with your left / right hand so you are holding on to both ends.  3. Gently pull up on the band until you feel a stretch in the front of your left / right shoulder. The movement of your arm toward the center of your body is called internal rotation.  ? Avoid shrugging your shoulder while you raise your arm. Keep your shoulder blade tucked down toward the middle of your back.  4. Hold for __________ seconds.  5. Release the stretch by letting go of the band and lowering your hands.  Repeat __________ times.  Complete this exercise __________ times a day.  Strengthening exercises  External rotation    1. Sit in a stable chair without armrests.  2. Secure an exercise band to a stable object at elbow height on your left / right side.  3. Place a soft object, such as a folded towel or a small pillow, between your left / right upper arm and your body to move your elbow about 4 inches (10 cm) away from your side.  4. Hold the end of the exercise band so it is tight and there is no slack.  5. Keeping your elbow pressed against the soft object, slowly move your forearm out, away from your abdomen (external rotation). Keep your body steady so only your forearm moves.  6. Hold for __________ seconds.  7. Slowly return to the starting position.  Repeat __________ times. Complete this exercise __________ times a day.  Shoulder abduction    1. Sit in a stable chair without armrests, or stand up.  2. Hold a __________ weight in your left / right hand, or hold an exercise band with both hands.  3. Start with your arms straight down and your left / right palm facing in, toward your body.  4. Slowly lift your left / right hand out to your side (abduction). Do not lift your hand above shoulder height unless your health care provider tells you that this is safe.  ? Keep your arms straight.  ? Avoid shrugging your shoulder while you do this movement. Keep your shoulder blade tucked down toward the middle of your back.  5. Hold for __________ seconds.  6. Slowly lower your arm, and return to the starting position.  Repeat __________ times. Complete this exercise __________ times a day.  Shoulder extension  1. Sit in a stable chair without armrests, or stand up.  2. Secure an exercise band to a stable object in front of you so it is at shoulder height.  3. Hold one end of the exercise band in each hand. Your palms should face each other.  4. Straighten your elbows and lift your hands up to shoulder height.  5. Step back, away from the  secured end of the exercise band, until the band is tight and there is no slack.  6. Squeeze your shoulder blades together as you pull your hands down to the sides of your thighs (extension). Stop when your hands are straight down by your sides. Do not let your hands go behind your body.  7. Hold for __________ seconds.  8. Slowly return to the starting position.  Repeat __________ times. Complete this exercise __________ times a day.  Shoulder row  1. Sit in a stable chair without armrests, or stand up.  2. Secure an exercise band to a stable object in front of you so it is at waist height.  3. Hold one end of the exercise band in each hand. Position your palms so that your thumbs are facing the ceiling (neutral position).  4. Bend each of your elbows to a 90-degree angle (right angle) and keep your upper arms at your sides.  5. Step back until the band is tight and there is no slack.  6. Slowly pull your elbows back behind you.  7. Hold for __________ seconds.  8. Slowly return to the starting position.  Repeat __________ times. Complete this exercise __________ times a day.  Shoulder press-ups    1. Sit in a stable chair that has armrests. Sit upright, with your feet flat on the floor.  2. Put your hands on the armrests so your elbows are bent and your fingers are pointing forward. Your hands should be about even with the sides of your body.  3. Push down on the armrests and use your arms to lift yourself off the chair. Straighten your elbows and lift yourself up as much as you comfortably can.  ? Move your shoulder blades down, and avoid letting your shoulders move up toward your ears.  ? Keep your feet on the ground. As you get stronger, your feet should support less of your body weight as you lift yourself up.  4. Hold for __________ seconds.  5. Slowly lower yourself back into the chair.  Repeat __________ times. Complete this exercise __________ times a day.  Wall push-ups    1. Stand so you are facing a  stable wall. Your feet should be about one arm-length away from the wall.  2. Lean forward and place your palms on the wall at shoulder height.  3. Keep your feet flat on the floor as you bend your elbows and lean forward toward the wall.  4. Hold for __________ seconds.  5. Straighten your elbows to push yourself back to the starting position.  Repeat __________ times. Complete this exercise __________ times a day.  This information is not intended to replace advice given to you by your health care provider. Make sure you discuss any questions you have with your health care provider.  Document Revised: 04/10/2020 Document Reviewed: 01/17/2020  [employer] Patient Education © 2021 [employer]

## 2022-06-08 ENCOUNTER — TELEPHONE (OUTPATIENT)
Dept: FAMILY MEDICINE CLINIC | Facility: CLINIC | Age: 82
End: 2022-06-08

## 2022-06-08 NOTE — TELEPHONE ENCOUNTER
Caller: Jewel Best    Relationship: Self    Best call back number: [phone]    What medication are you requesting: DEPRESSION    What are your current symptoms: DEPRESSED, NO APPETITE     How long have you been experiencing symptoms: 3 WEEKS    Have you had these symptoms before:    [x] Yes  [] No    Have you been treated for these symptoms before:   [x] Yes  [] No    If a prescription is needed, what is your preferred pharmacy and phone number: [id number] [address] AT [place] [phone] [facility] [phone]      Additional notes: PATIENT STATES THAT HIS DEPRESSION IS NOT GETTING IN BETTER DESPITE THE USE OF DEPRESSION MEDICATION. PATIENT IS ASKING FOR SOMETHING DIFFERENT IF POSSIBLE. IF THIS REQUIRES AN APPOINTMENT, PLEASE CALL PATIENT TO SCHEDULE.    PLEASE CALL TO LET PATIENT KNOW IF A NEW MEDICATION IS CALLED IN FOR HIM.

## 2022-06-09 ENCOUNTER — OFFICE VISIT (OUTPATIENT)
Dept: FAMILY MEDICINE CLINIC | Facility: CLINIC | Age: 82
End: 2022-06-09

## 2022-06-09 VITALS
OXYGEN SATURATION: 97 % | SYSTOLIC BLOOD PRESSURE: 126 MMHG | BODY MASS INDEX: 26.14 KG/M2 | DIASTOLIC BLOOD PRESSURE: 78 MMHG | HEIGHT: 70 IN | WEIGHT: 182.6 LBS | HEART RATE: 68 BPM

## 2022-06-09 DIAGNOSIS — F32.A DEPRESSION, UNSPECIFIED DEPRESSION TYPE: Primary | ICD-10-CM

## 2022-06-09 PROCEDURE — 99214 OFFICE O/P EST MOD 30 MIN: CPT | Performed by: INTERNAL MEDICINE

## 2022-06-09 RX ORDER — LISINOPRIL 20 MG/1
20 TABLET ORAL DAILY
Qty: 30 TABLET | Refills: 5 | Status: SHIPPED | OUTPATIENT
Start: 2022-06-09 | End: 2022-10-05 | Stop reason: SDUPTHER

## 2022-06-09 RX ORDER — BUPROPION HYDROCHLORIDE 150 MG/1
150 TABLET ORAL DAILY
Qty: 30 TABLET | Refills: 5 | Status: SHIPPED | OUTPATIENT
Start: 2022-06-09 | End: 2022-10-05

## 2022-06-09 NOTE — PROGRESS NOTES
Subjective Chief complaint is depression  Jewel Best is a 81 y.o. male.     History of Present Illness Jewel is here today for complaints of depression.  He has had problems with this in the past.  We have tried him on some generic Lexapro.  That seemed to help at 1 point time and then it did not.  We put him on some Zoloft.  He seemed to get some improvement and he stopped taking that when he felt better.  However he resumed taking it 2 months ago because of some depressive symptoms and it is not helping now.  He reports that he is not really feeling anxious and has not been using the BuSpar.  He is not sure what is triggering the depression.  He reports that his relationships with his children are good.  I think some of it has to do with his age and feelings of mortality.  He seems to be overly concerned about the value of his property that he is going to turn over to his children.  He reports that he is not suicidal.  He does have a gun but he does not want to harm himself.  His depression score was 11 but he reported that it is not that difficult for him to maintain his relationships.  He does report that he sometimes feels he would be better off dead but as above he does not want to harm himself.    The following portions of the patient's history were reviewed and updated as appropriate: allergies, current medications, past family history, past medical history, past social history, past surgical history and problem list.    Review of Systems   Constitutional: Negative for appetite change, chills and fever.   Respiratory: Negative for cough.    Psychiatric/Behavioral: Positive for dysphoric mood and depressed mood. Negative for sleep disturbance. The patient is not nervous/anxious.        Objective   Physical Exam  Vitals and nursing note reviewed.   Constitutional:       Appearance: Normal appearance.   Cardiovascular:      Rate and Rhythm: Normal rate and regular rhythm.      Pulses: Normal pulses.    Pulmonary:      Effort: Pulmonary effort is normal.      Breath sounds: Normal breath sounds.   Psychiatric:         Attention and Perception: Attention normal.         Mood and Affect: Mood is depressed.         Speech: Speech normal.         Behavior: Behavior is cooperative.         Thought Content: Thought content does not include suicidal ideation. Thought content does not include suicidal plan.           Assessment & Plan   Diagnoses and all orders for this visit:    1. Depression, unspecified depression type (Primary)    Other orders  -     lisinopril (PRINIVIL,ZESTRIL) 20 MG tablet; Take 1 tablet by mouth Daily.  Dispense: 30 tablet; Refill: 5  -     buPROPion XL (Wellbutrin XL) 150 MG 24 hr tablet; Take 1 tablet by mouth Daily.  Dispense: 30 tablet; Refill: 5    Jewel is here today for depression.  He has been tried on 2 SSRI medications.  I am going to try him on some Wellbutrin.  I am going to see him back in 4 weeks.  He will contact me if he has any problems with the medicine.  I did advise him to take one half of the sertraline for 2 weeks and then stop it.

## 2022-09-26 ENCOUNTER — TELEPHONE (OUTPATIENT)
Dept: FAMILY MEDICINE CLINIC | Facility: CLINIC | Age: 82
End: 2022-09-26

## 2022-09-26 NOTE — TELEPHONE ENCOUNTER
Pt called complaining of loss of appetite for 4 days now wanted to see you sooner, explained you are booked til next Wednesday but I will send you a message.   ([phone]    I advised the patient that I do not have a magic pill for appetite.  If he is having anxiety which he thinks he is then he really needs to be taking the BuSpar 3 times a day routinely.  He has not been doing that.  Also advised that he really needs to make himself eat or he will develop ketosis to further suppress his appetite.

## 2022-10-05 ENCOUNTER — OFFICE VISIT (OUTPATIENT)
Dept: FAMILY MEDICINE CLINIC | Facility: CLINIC | Age: 82
End: 2022-10-05

## 2022-10-05 VITALS
WEIGHT: 183.8 LBS | HEIGHT: 70 IN | HEART RATE: 85 BPM | DIASTOLIC BLOOD PRESSURE: 68 MMHG | SYSTOLIC BLOOD PRESSURE: 124 MMHG | OXYGEN SATURATION: 97 % | BODY MASS INDEX: 26.31 KG/M2

## 2022-10-05 DIAGNOSIS — E53.8 VITAMIN B12 DEFICIENCY: ICD-10-CM

## 2022-10-05 DIAGNOSIS — R63.0 POOR APPETITE: Primary | ICD-10-CM

## 2022-10-05 DIAGNOSIS — R53.83 OTHER FATIGUE: ICD-10-CM

## 2022-10-05 DIAGNOSIS — F41.8 DEPRESSION WITH ANXIETY: ICD-10-CM

## 2022-10-05 PROCEDURE — 99214 OFFICE O/P EST MOD 30 MIN: CPT | Performed by: INTERNAL MEDICINE

## 2022-10-05 RX ORDER — BUSPIRONE HYDROCHLORIDE 5 MG/1
5 TABLET ORAL 3 TIMES DAILY
Qty: 90 TABLET | Refills: 5 | Status: SHIPPED | OUTPATIENT
Start: 2022-10-05 | End: 2023-01-09

## 2022-10-05 RX ORDER — LISINOPRIL 20 MG/1
20 TABLET ORAL DAILY
Qty: 30 TABLET | Refills: 5 | Status: SHIPPED | OUTPATIENT
Start: 2022-10-05 | End: 2023-01-09 | Stop reason: SDUPTHER

## 2022-10-05 RX ORDER — MIRTAZAPINE 15 MG/1
15 TABLET, FILM COATED ORAL NIGHTLY
Qty: 30 TABLET | Refills: 5 | Status: SHIPPED | OUTPATIENT
Start: 2022-10-05 | End: 2023-01-09

## 2022-10-05 NOTE — PROGRESS NOTES
Subjective  Chief complaint is loss of appetite for 2 weeks  Jewel Best is a 82 y.o. male.     History of Present Illness Eboni is here today for loss of appetite for couple of weeks.  He does have a prior history of depression and anxiety.  Unfortunately is difficult to tell what he is taking.  He is supposed to be taking BuSpar 5 mg 3 times daily.  He supposed be taking Wellbutrin 150 mg daily and sertraline 100 mg daily.  It appears however he has not been refilling these.  The last prescriptions we gave him for these were last year.  He does report that he is taking 3 medications right now.  He reports that he is taking a medicine for his blood pressure.  He is taking the medicine 3 times daily which may be the BuSpar but it sounds like he is gone back to taking an old prescription for  Escitalopram.  His biggest complaint seems to be of lack of appetite.  He is not having abdominal pain or other symptoms.  He is not sleeping well.  He does feel for tired and fatigued.  He does have a history of vitamin B12 deficiency.  He was receiving B12 injections but has not been receiving those lately either.    The following portions of the patient's history were reviewed and updated as appropriate: allergies, current medications, past family history, past medical history, past social history, past surgical history and problem list.    Review of Systems   Constitutional: Positive for appetite change and fatigue. Negative for unexpected weight loss.   Gastrointestinal: Negative for abdominal pain.   Psychiatric/Behavioral: Positive for dysphoric mood and sleep disturbance. The patient is nervous/anxious.        Objective   Physical Exam  Constitutional:       Appearance: Normal appearance.   Neck:      Thyroid: No thyroid mass or thyromegaly.      Vascular: No carotid bruit.   Cardiovascular:      Rate and Rhythm: Normal rate and regular rhythm.      Pulses: Normal pulses.      Heart sounds: No murmur heard.    No  friction rub. No gallop.   Pulmonary:      Effort: Pulmonary effort is normal.      Breath sounds: No wheezing, rhonchi or rales.   Abdominal:      Palpations: Abdomen is soft.      Tenderness: There is no abdominal tenderness. There is no guarding or rebound.   Lymphadenopathy:      Cervical: No cervical adenopathy.   Neurological:      General: No focal deficit present.      Mental Status: He is alert.   Psychiatric:         Attention and Perception: Attention normal.         Mood and Affect: Mood is depressed.         Speech: Speech normal.         Behavior: Behavior is cooperative.         Thought Content: Thought content normal.           Assessment & Plan   Diagnoses and all orders for this visit:    1. Poor appetite (Primary)  -     CBC & Differential    2. Other fatigue  -     CBC & Differential  -     Comprehensive Metabolic Panel  -     TSH+Free T4    3. Depression with anxiety  -     TSH+Free T4    4. Vitamin B12 deficiency  -     CBC & Differential  -     Vitamin B12  -     Methylmalonic Acid, Serum    Other orders  -     busPIRone (BUSPAR) 5 MG tablet; Take 1 tablet by mouth 3 (Three) Times a Day.  Dispense: 90 tablet; Refill: 5  -     mirtazapine (Remeron) 15 MG tablet; Take 1 tablet by mouth Every Night.  Dispense: 30 tablet; Refill: 5  -     lisinopril (PRINIVIL,ZESTRIL) 20 MG tablet; Take 1 tablet by mouth Daily.  Dispense: 30 tablet; Refill: 5    Jewel is here today for follow-up.  It is difficult to tell what he has been taking.  I did advise him of the medicines that I want him to take.  I want him to take his lisinopril.  I want him to take the BuSpar 3 times daily.  I do not want him to take the Wellbutrin, E citalopram, or sertraline.  I am going to prescribe some mirtazapine to take at night and see if it will help his sleep and also improve his appetite.  We are going to check some labs today.  If they look okay then we will tell him to return in about 1 month.

## 2022-10-07 LAB
ALBUMIN SERPL-MCNC: 4.2 G/DL (ref 3.5–5.2)
ALBUMIN/GLOB SERPL: 1.8 G/DL
ALP SERPL-CCNC: 69 U/L (ref 39–117)
ALT SERPL-CCNC: 16 U/L (ref 1–41)
AST SERPL-CCNC: 14 U/L (ref 1–40)
BASOPHILS # BLD AUTO: 0.04 10*3/MM3 (ref 0–0.2)
BASOPHILS NFR BLD AUTO: 0.5 % (ref 0–1.5)
BILIRUB SERPL-MCNC: 0.4 MG/DL (ref 0–1.2)
BUN SERPL-MCNC: 18 MG/DL (ref 8–23)
BUN/CREAT SERPL: 15.9 (ref 7–25)
CALCIUM SERPL-MCNC: 10 MG/DL (ref 8.6–10.5)
CHLORIDE SERPL-SCNC: 104 MMOL/L (ref 98–107)
CO2 SERPL-SCNC: 28.6 MMOL/L (ref 22–29)
CREAT SERPL-MCNC: 1.13 MG/DL (ref 0.76–1.27)
EGFRCR SERPLBLD CKD-EPI 2021: 64.1 ML/MIN/1.73
EOSINOPHIL # BLD AUTO: 0.23 10*3/MM3 (ref 0–0.4)
EOSINOPHIL NFR BLD AUTO: 2.6 % (ref 0.3–6.2)
ERYTHROCYTE [DISTWIDTH] IN BLOOD BY AUTOMATED COUNT: 12.1 % (ref 12.3–15.4)
GLOBULIN SER CALC-MCNC: 2.3 GM/DL
GLUCOSE SERPL-MCNC: 95 MG/DL (ref 65–99)
HCT VFR BLD AUTO: 39.4 % (ref 37.5–51)
HGB BLD-MCNC: 13.9 G/DL (ref 13–17.7)
IMM GRANULOCYTES # BLD AUTO: 0.02 10*3/MM3 (ref 0–0.05)
IMM GRANULOCYTES NFR BLD AUTO: 0.2 % (ref 0–0.5)
LYMPHOCYTES # BLD AUTO: 2.48 10*3/MM3 (ref 0.7–3.1)
LYMPHOCYTES NFR BLD AUTO: 27.9 % (ref 19.6–45.3)
MCH RBC QN AUTO: 31.4 PG (ref 26.6–33)
MCHC RBC AUTO-ENTMCNC: 35.3 G/DL (ref 31.5–35.7)
MCV RBC AUTO: 88.9 FL (ref 79–97)
METHYLMALONATE SERPL-SCNC: 242 NMOL/L (ref 0–378)
MONOCYTES # BLD AUTO: 0.61 10*3/MM3 (ref 0.1–0.9)
MONOCYTES NFR BLD AUTO: 6.9 % (ref 5–12)
NEUTROPHILS # BLD AUTO: 5.5 10*3/MM3 (ref 1.7–7)
NEUTROPHILS NFR BLD AUTO: 61.9 % (ref 42.7–76)
NRBC BLD AUTO-RTO: 0 /100 WBC (ref 0–0.2)
PLATELET # BLD AUTO: 284 10*3/MM3 (ref 140–450)
POTASSIUM SERPL-SCNC: 5 MMOL/L (ref 3.5–5.2)
PROT SERPL-MCNC: 6.5 G/DL (ref 6–8.5)
RBC # BLD AUTO: 4.43 10*6/MM3 (ref 4.14–5.8)
SODIUM SERPL-SCNC: 142 MMOL/L (ref 136–145)
T4 FREE SERPL-MCNC: 1.11 NG/DL (ref 0.93–1.7)
TSH SERPL DL<=0.005 MIU/L-ACNC: 3.41 UIU/ML (ref 0.27–4.2)
VIT B12 SERPL-MCNC: 351 PG/ML (ref 211–946)
WBC # BLD AUTO: 8.88 10*3/MM3 (ref 3.4–10.8)

## 2022-10-24 ENCOUNTER — TELEPHONE (OUTPATIENT)
Dept: FAMILY MEDICINE CLINIC | Facility: CLINIC | Age: 82
End: 2022-10-24

## 2022-10-24 RX ORDER — ESCITALOPRAM OXALATE 20 MG/1
20 TABLET ORAL DAILY
Qty: 30 TABLET | Refills: 2 | Status: SHIPPED | OUTPATIENT
Start: 2022-10-24 | End: 2023-01-09 | Stop reason: SDUPTHER

## 2022-10-24 NOTE — TELEPHONE ENCOUNTER
"Caller: EstephaniaJewel    Relationship: Self    Best call back number: [phone]    What medications are you currently taking:   Current Outpatient Medications on File Prior to Visit   Medication Sig Dispense Refill   • busPIRone (BUSPAR) 5 MG tablet Take 1 tablet by mouth 3 (Three) Times a Day. 90 tablet 5   • cyanocobalamin 1000 MCG/ML injection Inject 1 mL into the appropriate muscle as directed by prescriber Every 30 (Thirty) Days. 10 mL 0   • lisinopril (PRINIVIL,ZESTRIL) 20 MG tablet Take 1 tablet by mouth Daily. 30 tablet 5   • mirtazapine (Remeron) 15 MG tablet Take 1 tablet by mouth Every Night. 30 tablet 5   • Needle, Disp, 25G X 1\" misc 1 each Every 30 (Thirty) Days. 10 each 0     Current Facility-Administered Medications on File Prior to Visit   Medication Dose Route Frequency Provider Last Rate Last Admin   • cyanocobalamin injection 1,000 mcg  1,000 mcg Intramuscular Q30 Days Ernie Panda MD              When did you start taking these medications: 10-5-22    Which medication are you concerned about: mirtazapine (Remeron) 15 MG tablet    Who prescribed you this medication: DR PANDA     What are your concerns: DOES NOT LIKE THE MEDICATION. WANTS TO GO BACK TO WHAT PATIENT WAS TAKING BEFORE     How long have you had these concerns:   The patient only took 1 dose of the mirtazapine.  He did not like the way it made him feel.  He went back to the E citalopram and is requesting a prescription for that.  I did advise I would go on and send that.          "

## 2023-01-09 ENCOUNTER — OFFICE VISIT (OUTPATIENT)
Dept: FAMILY MEDICINE CLINIC | Facility: CLINIC | Age: 83
End: 2023-01-09
Payer: MEDICARE

## 2023-01-09 VITALS
BODY MASS INDEX: 26.63 KG/M2 | WEIGHT: 186 LBS | OXYGEN SATURATION: 97 % | SYSTOLIC BLOOD PRESSURE: 124 MMHG | HEART RATE: 71 BPM | HEIGHT: 70 IN | DIASTOLIC BLOOD PRESSURE: 78 MMHG

## 2023-01-09 DIAGNOSIS — M16.11 PRIMARY OSTEOARTHRITIS OF RIGHT HIP: ICD-10-CM

## 2023-01-09 DIAGNOSIS — Z00.00 ENCOUNTER FOR SUBSEQUENT ANNUAL WELLNESS VISIT (AWV) IN MEDICARE PATIENT: Primary | ICD-10-CM

## 2023-01-09 PROCEDURE — G0439 PPPS, SUBSEQ VISIT: HCPCS | Performed by: INTERNAL MEDICINE

## 2023-01-09 PROCEDURE — 90677 PCV20 VACCINE IM: CPT | Performed by: INTERNAL MEDICINE

## 2023-01-09 PROCEDURE — G0009 ADMIN PNEUMOCOCCAL VACCINE: HCPCS | Performed by: INTERNAL MEDICINE

## 2023-01-09 PROCEDURE — 1170F FXNL STATUS ASSESSED: CPT | Performed by: INTERNAL MEDICINE

## 2023-01-09 PROCEDURE — 1126F AMNT PAIN NOTED NONE PRSNT: CPT | Performed by: INTERNAL MEDICINE

## 2023-01-09 PROCEDURE — 1159F MED LIST DOCD IN RCRD: CPT | Performed by: INTERNAL MEDICINE

## 2023-01-09 RX ORDER — LISINOPRIL 20 MG/1
20 TABLET ORAL DAILY
Qty: 30 TABLET | Refills: 5 | Status: SHIPPED | OUTPATIENT
Start: 2023-01-09

## 2023-01-09 RX ORDER — ESCITALOPRAM OXALATE 20 MG/1
20 TABLET ORAL DAILY
Qty: 30 TABLET | Refills: 2 | Status: SHIPPED | OUTPATIENT
Start: 2023-01-09

## 2023-01-09 NOTE — PROGRESS NOTES
The ABCs of the Annual Wellness Visit  Subsequent Medicare Wellness Visit    Subjective      Jewel Best is a 82 y.o. male who presents for a Subsequent Medicare Wellness Visit.  Jewel is here today for a wellness visit.  He basically is up to speed on most things.  We did discuss vaccines.  He has had 3 of the COVID vaccines and is really not interested in getting any further vaccines.  We did discuss Pneumovax and he is interested in that.  We advised he could check with his pharmacy regarding shingles vaccine.  He is on some medicine for depression and anxiety.  He is taking the citalopram for that and some lisinopril for blood pressure.    The following portions of the patient's history were reviewed and   updated as appropriate: allergies, current medications, past family history, past medical history, past social history, past surgical history and problem list.    Compared to one year ago, the patient feels his physical   health is the same.    Compared to one year ago, the patient feels his mental   health is the same.    Recent Hospitalizations:  He was not admitted to the hospital during the last year.       Current Medical Providers:  Patient Care Team:  Ernie Panda MD as PCP - General  Ernie Panda MD as PCP - Family Medicine    Outpatient Medications Prior to Visit   Medication Sig Dispense Refill   • escitalopram (Lexapro) 20 MG tablet Take 1 tablet by mouth Daily. 30 tablet 2   • lisinopril (PRINIVIL,ZESTRIL) 20 MG tablet Take 1 tablet by mouth Daily. 30 tablet 5   • busPIRone (BUSPAR) 5 MG tablet Take 1 tablet by mouth 3 (Three) Times a Day. 90 tablet 5   • cyanocobalamin 1000 MCG/ML injection Inject 1 mL into the appropriate muscle as directed by prescriber Every 30 (Thirty) Days. 10 mL 0   • mirtazapine (Remeron) 15 MG tablet Take 1 tablet by mouth Every Night. 30 tablet 5   • Needle, Disp, 25G X 1\" misc 1 each Every 30 (Thirty) Days. 10 each 0     Facility-Administered  Medications Prior to Visit   Medication Dose Route Frequency Provider Last Rate Last Admin   • cyanocobalamin injection 1,000 mcg  1,000 mcg Intramuscular Q30 Days Ernie Panda MD           No opioid medication identified on active medication list. I have reviewed chart for other potential  high risk medication/s and harmful drug interactions in the elderly.          Aspirin is not on active medication list.  Aspirin use is not indicated based on review of current medical condition/s. Risk of harm outweighs potential benefits.  .    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  ACP discussion was held with the patient during this visit. Patient does not have an advance directive, information provided.     Objective    Vitals:    01/09/23 1404   BP: 124/78   BP Location: Left arm   Pulse: 71   SpO2: 97%   Weight: 84.4 kg (186 lb)   Height: 177.8 cm (70\")   PainSc: 0-No pain     Estimated body mass index is 26.69 kg/m² as calculated from the following:    Height as of this encounter: 177.8 cm (70\").    Weight as of this encounter: 84.4 kg (186 lb).    BMI is >= 25 and <30. (Overweight) The following options were offered after discussion;: weight loss educational material (shared in after visit summary) and exercise counseling/recommendations  Physical Exam  Constitutional:       Appearance: Normal appearance.   Cardiovascular:      Rate and Rhythm: Normal rate and regular rhythm.   Pulmonary:      Effort: Pulmonary effort is normal.      Breath sounds: No wheezing, rhonchi or rales.   Musculoskeletal:      Comments: He has limited internal and external rotation of the right hip when compared with the left.  There is no hip flexor tendon tenderness and no  trochanteric bursa tenderness.   Neurological:      Mental Status: He is alert.           Does the patient have evidence of cognitive impairment?   No            HEALTH RISK ASSESSMENT    Smoking Status:  Social History     Tobacco Use   Smoking Status Former   • Packs/day: 1.00   • Years: 10.00   • Pack years: 10.00   • Types: Cigarettes   • Quit date: 1971   • Years since quittin.1   Smokeless Tobacco Never     Alcohol Consumption:  Social History     Substance and Sexual Activity   Alcohol Use No     Fall Risk Screen:    RAJANADI Fall Risk Assessment has not been completed.    Depression Screening:  PHQ-2/PHQ-9 Depression Screening 2023   Little Interest or Pleasure in Doing Things 0-->not at all   Feeling Down, Depressed or Hopeless 0-->not at all   Trouble Falling or Staying Asleep, or Sleeping Too Much -   Feeling Tired or Having Little Energy -   Poor Appetite or Overeating -   Feeling Bad about Yourself - or that You are a Failure or Have Let Yourself or Your Family Down -   Trouble Concentrating on Things, Such as Reading the Newspaper or Watching Television -   Moving or Speaking So Slowly that Other People Could Have Noticed? Or the Opposite - Being So Fidgety -   Thoughts that You Would be Better Off Dead or of Hurting Yourself in Some Way -   PHQ-9: Brief Depression Severity Measure Score 0   If You Checked Off Any Problems, How Difficult Have These Problems Made It For You to Do Your Work, Take Care of Things at Home, or Get Along with Other People? -       Health Habits and Functional and Cognitive Screening:  Functional & Cognitive Status 2023   Do you have difficulty preparing food and eating? No   Do you have difficulty bathing yourself, getting dressed or grooming yourself? No   Do you have difficulty using the toilet? No   Do you have difficulty moving around from place to place? Yes   Do you have trouble with steps or getting out of a bed or a chair? No   Current Diet Unhealthy  Diet   Dental Exam Up to date   Eye Exam Up to date   Current Exercises Include Walking   Do you need help using the phone?  No   Are you deaf or do you have serious difficulty hearing?  No   Do you need help with transportation? No   Do you need help shopping? No   Do you need help preparing meals?  No   Do you need help with housework?  No   Do you need help with laundry? No   Do you need help taking your medications? No   Do you need help managing money? No   Do you ever drive or ride in a car without wearing a seat belt? No   Have you felt unusual stress, anger or loneliness in the last month? No   Who do you live with? Alone   If you need help, do you have trouble finding someone available to you? No   Do you have difficulty concentrating, remembering or making decisions? No       Age-appropriate Screening Schedule:  Refer to the list below for future screening recommendations based on patient's age, sex and/or medical conditions. Orders for these recommended tests are listed in the plan section. The patient has been provided with a written plan.    Health Maintenance   Topic Date Due   • ZOSTER VACCINE (1 of 2) Never done   • LIPID PANEL  05/30/2019   • TDAP/TD VACCINES (2 - Td or Tdap) 07/30/2028   • INFLUENZA VACCINE  Completed                CMS Preventative Services Quick Reference  Risk Factors Identified During Encounter:    Immunizations Discussed/Encouraged: Tdap, Influenza, Prevnar 20 (Pneumococcal 20-valent conjugate), Shingrix and COVID19    The above risks/problems have been discussed with the patient.  Pertinent information has been shared with the patient in the After Visit Summary.    Diagnoses and all orders for this visit:    1. Encounter for subsequent annual wellness visit (AWV) in Medicare patient (Primary)    2. Primary osteoarthritis of right hip    Other orders  -     lisinopril (PRINIVIL,ZESTRIL) 20 MG tablet; Take 1 tablet by mouth Daily.  Dispense: 30 tablet; Refill: 5  -      escitalopram (Lexapro) 20 MG tablet; Take 1 tablet by mouth Daily.  Dispense: 30 tablet; Refill: 2    Jewel is here today for his wellness visit.  He really wants to make this his last wellness visit.  He really does not think he gets much out of them.  We did discuss his hip pain.  He is not really wanting to do anything in terms of x-rays or surgery at this time.  He can certainly let me know if he changes his mind.    Follow Up:   Next Medicare Wellness visit to be scheduled in 1 year.      An After Visit Summary and PPPS were made available to the patient.

## 2023-04-28 ENCOUNTER — TELEPHONE (OUTPATIENT)
Dept: FAMILY MEDICINE CLINIC | Facility: CLINIC | Age: 83
End: 2023-04-28
Payer: MEDICARE

## 2023-04-28 RX ORDER — ESCITALOPRAM OXALATE 20 MG/1
20 TABLET ORAL DAILY
Qty: 30 TABLET | Refills: 5 | Status: SHIPPED | OUTPATIENT
Start: 2023-04-28

## 2023-04-28 NOTE — TELEPHONE ENCOUNTER
Caller: Jewel Best    Relationship: Self    Best call back number:[phone]    Who are you requesting to speak with (clinical staff, provider,  specific staff member): DR. MONTANA    What was the call regarding: WANTS TO SPEAK TO  ABOUT DEPRESSION MEDICATION AND POSSIBLE GETTING AN INCREASE     Do you require a callback: YES    The patient has been out of his Lexapro for little bit.  He is under little bit of stress after some hip surgery.  I am going to reorder it for him.  I do not think he needs the BuSpar.

## 2023-05-15 ENCOUNTER — OFFICE VISIT (OUTPATIENT)
Dept: FAMILY MEDICINE CLINIC | Facility: CLINIC | Age: 83
End: 2023-05-15
Payer: MEDICARE

## 2023-05-15 VITALS
SYSTOLIC BLOOD PRESSURE: 120 MMHG | OXYGEN SATURATION: 97 % | HEART RATE: 86 BPM | WEIGHT: 182 LBS | DIASTOLIC BLOOD PRESSURE: 68 MMHG | HEIGHT: 70 IN | BODY MASS INDEX: 26.05 KG/M2 | RESPIRATION RATE: 16 BRPM

## 2023-05-15 DIAGNOSIS — R79.89 ELEVATED SERUM CREATININE: ICD-10-CM

## 2023-05-15 DIAGNOSIS — R53.83 OTHER FATIGUE: ICD-10-CM

## 2023-05-15 DIAGNOSIS — D50.9 IRON DEFICIENCY ANEMIA, UNSPECIFIED IRON DEFICIENCY ANEMIA TYPE: ICD-10-CM

## 2023-05-15 DIAGNOSIS — F33.1 MODERATE EPISODE OF RECURRENT MAJOR DEPRESSIVE DISORDER: ICD-10-CM

## 2023-05-15 DIAGNOSIS — R91.1 SOLID NODULE OF LUNG 6 MM TO 8 MM IN DIAMETER: ICD-10-CM

## 2023-05-15 DIAGNOSIS — R06.02 SHORTNESS OF BREATH: Primary | ICD-10-CM

## 2023-05-15 DIAGNOSIS — R45.851 SUICIDAL THOUGHTS: ICD-10-CM

## 2023-05-15 RX ORDER — ASPIRIN 81 MG/1
1 TABLET, COATED ORAL EVERY 12 HOURS SCHEDULED
COMMUNITY
Start: 2023-04-07

## 2023-05-15 NOTE — PROGRESS NOTES
Subjective Chief complaint is ER follow-up  Jewel Best is a 82 y.o. male.     History of Present Illness Jewel is here today for follow-up after an ER visit on 6 May.  This was a [facility].  I did look under care everywhere but cannot find a record of this visit.  It will not update.  His discharge papers say that he was having shortness of breath.  He was also reportedly feeling an overwhelming sense of doom or that he was going to die.  He has had those thoughts on and off for some time.  He sometimes thinks about suicide but certainly does not have a plan and says he could never do it.  His CT scan of the chest apparently showed 0.8 cm nodule.  He was a former smoker.  We did discuss doing a 6-month follow-up.  His labs showed a little bit of an anemia.  His hemoglobin was 10 with a previous baseline of 12 according to their charts.  He also had a mild elevation in his creatinine which was 1.25 with a previous level of approximately 1.    The following portions of the patient's history were reviewed and updated as appropriate: allergies, current medications, past family history, past medical history, past social history, past surgical history and problem list.    Review of Systems   Constitutional: Negative for chills and fever.   Respiratory: Positive for shortness of breath. Negative for chest tightness.    Psychiatric/Behavioral: Positive for dysphoric mood, suicidal ideas, depressed mood and stress. The patient is nervous/anxious.        Objective   Physical Exam  Vitals and nursing note reviewed.   Constitutional:       Appearance: Normal appearance.   Cardiovascular:      Rate and Rhythm: Normal rate and regular rhythm.      Pulses: Normal pulses.      Heart sounds: No murmur heard.    No friction rub. No gallop.   Pulmonary:      Effort: Pulmonary effort is normal.      Breath sounds: No wheezing, rhonchi or rales.   Abdominal:      General: Bowel sounds are normal.      Palpations: Abdomen is  soft.      Tenderness: There is no abdominal tenderness. There is no guarding or rebound.   Musculoskeletal:      Right lower leg: No edema.      Left lower leg: No edema.   Lymphadenopathy:      Cervical: No cervical adenopathy.   Neurological:      Mental Status: He is alert.           Assessment & Plan   Diagnoses and all orders for this visit:    1. Shortness of breath (Primary)  -     CBC & Differential    2. Solid nodule of lung 6 mm to 8 mm in diameter    3. Iron deficiency anemia, unspecified iron deficiency anemia type  -     CBC & Differential  -     Iron Profile  -     Ferritin  -     Vitamin B12  -     Folate  -     Reticulocytes  -     TSH+Free T4    4. Elevated serum creatinine  -     Comprehensive Metabolic Panel    5. Other fatigue  -     TSH+Free T4    6. Moderate episode of recurrent major depressive disorder  -     Ambulatory Referral to Psychiatry    7. Suicidal thoughts  -     Ambulatory Referral to Psychiatry    Other orders  -     Vortioxetine HBr (Trintellix) 10 MG tablet tablet; Take 1 tablet by mouth Daily With Breakfast.  Dispense: 30 tablet; Refill: 5    Jewel is here today for follow-up after an ER visit.  I do not really find much on exam to explain his symptoms.  I am going to repeat the laboratories that were abnormal.  I am going to refer him to psychiatrist for his depressive episodes with thoughts of suicide.  He does not have any sort of plan and reports that he could never do this.  We did discuss repeating the lung scan in 6 months.

## 2023-05-16 LAB
ALBUMIN SERPL-MCNC: 4.3 G/DL (ref 3.5–5.2)
ALBUMIN/GLOB SERPL: 1.7 G/DL
ALP SERPL-CCNC: 89 U/L (ref 39–117)
ALT SERPL-CCNC: 16 U/L (ref 1–41)
AST SERPL-CCNC: 21 U/L (ref 1–40)
BASOPHILS # BLD AUTO: 0.04 10*3/MM3 (ref 0–0.2)
BASOPHILS NFR BLD AUTO: 0.4 % (ref 0–1.5)
BILIRUB SERPL-MCNC: 0.4 MG/DL (ref 0–1.2)
BUN SERPL-MCNC: 22 MG/DL (ref 8–23)
BUN/CREAT SERPL: 22 (ref 7–25)
CALCIUM SERPL-MCNC: 9.8 MG/DL (ref 8.6–10.5)
CHLORIDE SERPL-SCNC: 100 MMOL/L (ref 98–107)
CO2 SERPL-SCNC: 27.4 MMOL/L (ref 22–29)
CREAT SERPL-MCNC: 1 MG/DL (ref 0.76–1.27)
EGFRCR SERPLBLD CKD-EPI 2021: 74.2 ML/MIN/1.73
EOSINOPHIL # BLD AUTO: 0.38 10*3/MM3 (ref 0–0.4)
EOSINOPHIL NFR BLD AUTO: 4.1 % (ref 0.3–6.2)
ERYTHROCYTE [DISTWIDTH] IN BLOOD BY AUTOMATED COUNT: 12 % (ref 12.3–15.4)
FERRITIN SERPL-MCNC: 303 NG/ML (ref 30–400)
FOLATE SERPL-MCNC: 9.51 NG/ML (ref 4.78–24.2)
GLOBULIN SER CALC-MCNC: 2.5 GM/DL
GLUCOSE SERPL-MCNC: 91 MG/DL (ref 65–99)
HCT VFR BLD AUTO: 34.4 % (ref 37.5–51)
HGB BLD-MCNC: 11.8 G/DL (ref 13–17.7)
IMM GRANULOCYTES # BLD AUTO: 0.02 10*3/MM3 (ref 0–0.05)
IMM GRANULOCYTES NFR BLD AUTO: 0.2 % (ref 0–0.5)
IRON SATN MFR SERPL: 21 % (ref 20–50)
IRON SERPL-MCNC: 77 MCG/DL (ref 59–158)
LYMPHOCYTES # BLD AUTO: 2.66 10*3/MM3 (ref 0.7–3.1)
LYMPHOCYTES NFR BLD AUTO: 28.4 % (ref 19.6–45.3)
MCH RBC QN AUTO: 30.9 PG (ref 26.6–33)
MCHC RBC AUTO-ENTMCNC: 34.3 G/DL (ref 31.5–35.7)
MCV RBC AUTO: 90.1 FL (ref 79–97)
MONOCYTES # BLD AUTO: 0.58 10*3/MM3 (ref 0.1–0.9)
MONOCYTES NFR BLD AUTO: 6.2 % (ref 5–12)
NEUTROPHILS # BLD AUTO: 5.69 10*3/MM3 (ref 1.7–7)
NEUTROPHILS NFR BLD AUTO: 60.7 % (ref 42.7–76)
NRBC BLD AUTO-RTO: 0 /100 WBC (ref 0–0.2)
PLATELET # BLD AUTO: 301 10*3/MM3 (ref 140–450)
POTASSIUM SERPL-SCNC: 5.4 MMOL/L (ref 3.5–5.2)
PROT SERPL-MCNC: 6.8 G/DL (ref 6–8.5)
RBC # BLD AUTO: 3.82 10*6/MM3 (ref 4.14–5.8)
RETICS/RBC NFR AUTO: 1.51 % (ref 0.7–1.9)
SODIUM SERPL-SCNC: 138 MMOL/L (ref 136–145)
T4 FREE SERPL-MCNC: 0.96 NG/DL (ref 0.93–1.7)
TIBC SERPL-MCNC: 374 MCG/DL
TSH SERPL DL<=0.005 MIU/L-ACNC: 4.49 UIU/ML (ref 0.27–4.2)
UIBC SERPL-MCNC: 297 MCG/DL (ref 112–346)
VIT B12 SERPL-MCNC: 446 PG/ML (ref 211–946)
WBC # BLD AUTO: 9.37 10*3/MM3 (ref 3.4–10.8)

## 2023-05-19 NOTE — TELEPHONE ENCOUNTER
Caller: Jewel Best    Relationship: Self    Best call back number: [phone]    What medications are you currently taking:   Current Outpatient Medications on File Prior to Visit   Medication Sig Dispense Refill   • Aspirin Low Dose 81 MG EC tablet Take 1 tablet by mouth Every 12 (Twelve) Hours.     • lisinopril (PRINIVIL,ZESTRIL) 20 MG tablet Take 1 tablet by mouth Daily. 30 tablet 5   • Vortioxetine HBr (Trintellix) 10 MG tablet tablet Take 1 tablet by mouth Daily With Breakfast. 30 tablet 5     Current Facility-Administered Medications on File Prior to Visit   Medication Dose Route Frequency Provider Last Rate Last Admin   • cyanocobalamin injection 1,000 mcg  1,000 mcg Intramuscular Q30 Days Ernie Panda MD              When did you start taking these medications: N/A    Which medication are you concerned about: Vortioxetine HBr (Trintellix) 10 MG tablet tablet    Who prescribed you this medication:      What are your concerns: MEDICATION  DOLLARS WITH INSURANCE, PATIENT WAS WONDERING IF THERE IS A ALTERNATIVE MEDICATION.     How long have you had these concerns:N/A

## 2023-05-22 ENCOUNTER — TELEPHONE (OUTPATIENT)
Dept: FAMILY MEDICINE CLINIC | Facility: CLINIC | Age: 83
End: 2023-05-22
Payer: MEDICARE

## 2023-05-22 RX ORDER — DULOXETIN HYDROCHLORIDE 30 MG/1
30 CAPSULE, DELAYED RELEASE ORAL DAILY
Qty: 30 CAPSULE | Refills: 5 | Status: SHIPPED | OUTPATIENT
Start: 2023-05-22

## 2023-05-22 NOTE — TELEPHONE ENCOUNTER
CAROLINA WITH [facility] CALLED AND SAID SHE WOULD HAVE TO SPEAK WITH HER DR'S TO SCHEDULE APPT.  PT ASKED HER IF IT MADE A DIFFERENCE IF YOU ARE SUICIDAL OR NOT?  SHE SAID HE SHOULD BE REFERRED TO [facility] OR [facility] TO BE EVALUATED.  I DID ASK HER TO CHECK WITH HER DR'S FOR APPT, THEY ARE LIKE 6WKS OUT.

## 2023-05-22 NOTE — TELEPHONE ENCOUNTER
Caller: Jewel Best    Relationship: Self    Best call back number: [phone]    What medications are you currently taking:   Current Outpatient Medications on File Prior to Visit   Medication Sig Dispense Refill   • Aspirin Low Dose 81 MG EC tablet Take 1 tablet by mouth Every 12 (Twelve) Hours.     • lisinopril (PRINIVIL,ZESTRIL) 20 MG tablet Take 1 tablet by mouth Daily. 30 tablet 5   • Vortioxetine HBr (Trintellix) 10 MG tablet tablet Take 1 tablet by mouth Daily With Breakfast. 30 tablet 5     Current Facility-Administered Medications on File Prior to Visit   Medication Dose Route Frequency Provider Last Rate Last Admin   • cyanocobalamin injection 1,000 mcg  1,000 mcg Intramuscular Q30 Days Ernie Panda MD              Which medication are you concerned about: TRINTELLIX    Who prescribed you this medication: DR PANDA    What are your concerns: PATIENT STATES THIS WAS $300 AT THE PHARMACY, PLEASE ADVISE WHAT HE CAN TAKE IN PLACE OF THIS MEDICATION THAT WILL BE MORE AFFORDABLE.     I advised that we would try Cymbalta and start at 30 mg.

## 2023-05-26 ENCOUNTER — OFFICE VISIT (OUTPATIENT)
Dept: FAMILY MEDICINE CLINIC | Facility: CLINIC | Age: 83
End: 2023-05-26

## 2023-05-26 VITALS
SYSTOLIC BLOOD PRESSURE: 118 MMHG | HEIGHT: 70 IN | OXYGEN SATURATION: 98 % | BODY MASS INDEX: 24.91 KG/M2 | RESPIRATION RATE: 16 BRPM | WEIGHT: 174 LBS | DIASTOLIC BLOOD PRESSURE: 66 MMHG | HEART RATE: 82 BPM

## 2023-05-26 DIAGNOSIS — F32.9 REACTIVE DEPRESSION: Primary | ICD-10-CM

## 2023-05-26 PROCEDURE — 3078F DIAST BP <80 MM HG: CPT | Performed by: INTERNAL MEDICINE

## 2023-05-26 PROCEDURE — 3074F SYST BP LT 130 MM HG: CPT | Performed by: INTERNAL MEDICINE

## 2023-05-26 PROCEDURE — 99214 OFFICE O/P EST MOD 30 MIN: CPT | Performed by: INTERNAL MEDICINE

## 2023-05-26 NOTE — PROGRESS NOTES
"Chief Complaint  Fatigue (No energy no appetite and feeling weak over sleeping)    Subjective        Jewel Best presents to [facility]  History of Present Illness chief complaint is fatigue and no energy  Young is here today for follow-up.  Since his last visit he has only gotten approximately 3 doses of the duloxetine.  He is still experiencing lack of energy and appetite.  He has lost approximately 8 pounds since his last visit.  He feels weak.  He feels like he wants to stay in bed all the time.  He no longer feels like he is imminently dying.  We did discuss some issues.  He does have an ultimate fear of dying and that may be creating some depressive symptoms.  He also is struggling with some relationships in his family.  He and his son who lives in [place] are strange.  His son came in for his hip surgery.  Apparently something happened and he left with the comment that his father was a \"worthless piece of shit\" .  He also does not have a close relationship with his daughter.  We did discuss that he may need to see a psychologist to come to the root of some of the problems.  He cannot change his children but maybe he can make some changes that could improve his relationships.  He does report that he has no appetite and forces himself to eat.  Eating does not cause any abdominal pain.  He is not having any abdominal symptoms such as nausea or diarrhea.  Objective   Vital Signs:  /66 (BP Location: Right arm, Patient Position: Sitting, Cuff Size: Adult)   Pulse 82   Resp 16   Ht 177.9 cm (70.02\")   Wt 78.9 kg (174 lb)   SpO2 98%   BMI 24.95 kg/m²   Estimated body mass index is 24.95 kg/m² as calculated from the following:    Height as of this encounter: 177.9 cm (70.02\").    Weight as of this encounter: 78.9 kg (174 lb).       BMI is within normal parameters. No other follow-up for BMI required.      Physical Exam  Vitals and nursing note reviewed.   Constitutional:  "      Appearance: Normal appearance.   Neck:      Thyroid: No thyromegaly.   Cardiovascular:      Rate and Rhythm: Normal rate and regular rhythm.      Pulses: Normal pulses.      Heart sounds: No murmur heard.    No friction rub. No gallop.   Pulmonary:      Effort: Pulmonary effort is normal.      Breath sounds: No wheezing, rhonchi or rales.   Abdominal:      General: Bowel sounds are normal. There is no distension.      Palpations: Abdomen is soft. There is no mass.      Tenderness: There is no abdominal tenderness. There is no guarding or rebound.   Musculoskeletal:      Right lower leg: No edema.      Left lower leg: No edema.   Lymphadenopathy:      Cervical: No cervical adenopathy.      Right cervical: No superficial, deep or posterior cervical adenopathy.     Left cervical: No superficial, deep or posterior cervical adenopathy.      Upper Body:      Right upper body: No axillary adenopathy.      Left upper body: No axillary adenopathy.   Neurological:      Mental Status: He is alert.        Result Review :  The following data was reviewed by: Ernie Panda MD on 05/26/2023:  CMP        10/5/2022    12:01 5/15/2023    15:50   CMP   Glucose 95   91     BUN 18   22     Creatinine 1.13   1.00     Sodium 142   138     Potassium 5.0   5.4     Chloride 104   100     Calcium 10.0   9.8     Total Protein 6.5   6.8     Albumin 4.20   4.3     Globulin 2.3   2.5     Total Bilirubin 0.4   0.4     Alkaline Phosphatase 69   89     AST (SGOT) 14   21     ALT (SGPT) 16   16     BUN/Creatinine Ratio 15.9   22.0       CBC        10/5/2022    12:01 5/15/2023    15:50   CBC   WBC 8.88   9.37     RBC 4.43   3.82     Hemoglobin 13.9   11.8     Hematocrit 39.4   34.4     MCV 88.9   90.1     MCH 31.4   30.9     MCHC 35.3   34.3     RDW 12.1   12.0     Platelets 284   301       TSH        10/5/2022    12:01 5/15/2023    15:50   TSH   TSH 3.410   4.490                    Assessment and Plan   Diagnoses and all orders for  this visit:    1. Reactive depression (Primary)    Jewel is here today for follow-up.  I do not come up with a good reason for his symptoms.  The constellation of symptoms would indicate depression.  His exam is unremarkable.  He is a little bit anemic and his thyroid is a little bit off.  Going to see him back in about 3 weeks.  We will see if the 30 mg of duloxetine is helping.  We will consider repeating some lab work at that time.       I spent 35 minutes caring for Jewel on this date of service. This time includes time spent by me in the following activities:preparing for the visit, reviewing tests, obtaining and/or reviewing a separately obtained history, performing a medically appropriate examination and/or evaluation , counseling and educating the patient/family/caregiver, documenting information in the medical record and independently interpreting results and communicating that information with the patient/family/caregiver  Follow Up   No follow-ups on file.  Patient was given instructions and counseling regarding his condition or for health maintenance advice. Please see specific information pulled into the AVS if appropriate.

## 2023-07-24 ENCOUNTER — OFFICE VISIT (OUTPATIENT)
Dept: FAMILY MEDICINE CLINIC | Facility: CLINIC | Age: 83
End: 2023-07-24
Payer: MEDICARE

## 2023-07-24 ENCOUNTER — TELEPHONE (OUTPATIENT)
Dept: FAMILY MEDICINE CLINIC | Facility: CLINIC | Age: 83
End: 2023-07-24

## 2023-07-24 VITALS
HEART RATE: 87 BPM | SYSTOLIC BLOOD PRESSURE: 111 MMHG | HEIGHT: 71 IN | BODY MASS INDEX: 24.92 KG/M2 | DIASTOLIC BLOOD PRESSURE: 70 MMHG | WEIGHT: 178 LBS | TEMPERATURE: 98.1 F | RESPIRATION RATE: 16 BRPM | OXYGEN SATURATION: 96 %

## 2023-07-24 DIAGNOSIS — F32.1 CURRENT MODERATE EPISODE OF MAJOR DEPRESSIVE DISORDER WITHOUT PRIOR EPISODE: Primary | ICD-10-CM

## 2023-07-24 PROBLEM — F32.9 MAJOR DEPRESSION, SINGLE EPISODE: Status: ACTIVE | Noted: 2023-07-24

## 2023-07-24 PROCEDURE — 99213 OFFICE O/P EST LOW 20 MIN: CPT | Performed by: INTERNAL MEDICINE

## 2023-07-24 PROCEDURE — 3074F SYST BP LT 130 MM HG: CPT | Performed by: INTERNAL MEDICINE

## 2023-07-24 PROCEDURE — 3078F DIAST BP <80 MM HG: CPT | Performed by: INTERNAL MEDICINE

## 2023-07-24 RX ORDER — DULOXETIN HYDROCHLORIDE 60 MG/1
60 CAPSULE, DELAYED RELEASE ORAL DAILY
Qty: 30 CAPSULE | Refills: 5 | Status: SHIPPED | OUTPATIENT
Start: 2023-07-24

## 2023-07-24 NOTE — TELEPHONE ENCOUNTER
Caller: Jewel Best    Relationship: Self    Best call back number: [phone]     What was the call regarding: PATIENT WAS WANTING TO LET DR MONTANA KNOW THAT  HE TAKES THE DULOXETINE 30 MG AND IS NOW OUT OF THE MEDICATION    PLEASE CALL AND ADVISE

## 2023-07-24 NOTE — PROGRESS NOTES
Subjective chief complaint is follow-up on depression  Jewel Best is a 83 y.o. male.     History of Present Illness Jewel is here today for follow-up on depression.  Apparently had a visit [facility] after complaining of suicidal ideation.  Do not think he actually has a plan.  He basically just says he is lost the will to live.  I cannot access the files from [place].  There is some sort of restriction on that.  He does report that he is on a medication.  He says it is a capsule but he does not know what the medicine name is or strength.  Advised him to call and let me know.  His symptoms seem to stem from loneliness.  He lives alone.  He really does not have many friends.  His son has not spoken to him in 3 months and his daughter is only recently begun contact him again.  We have made several referrals to psychiatrist and he is still not heard anything about appointments.  The following portions of the patient's history were reviewed and updated as appropriate: allergies, current medications, and problem list.    Review of Systems   Psychiatric/Behavioral:  Positive for dysphoric mood and suicidal ideas. Negative for behavioral problems. The patient is nervous/anxious.      Objective   Physical Exam  Vitals and nursing note reviewed.   Constitutional:       Appearance: Normal appearance.   Neurological:      Mental Status: He is alert.   Psychiatric:         Mood and Affect: Mood normal.         Behavior: Behavior normal.         Thought Content: Thought content does not include suicidal plan.       Assessment & Plan   Diagnoses and all orders for this visit:    1. Current moderate episode of major depressive disorder without prior episode (Primary)  -     Ambulatory Referral to Psychiatry      Jewel is here today for a lump.  I did advise him to call and let me know what the medication he is on and what strength it is.  We can decide go up on the dosing.  Going to try to get him set up to see  one of the [facility] behavioral specialist.

## 2023-08-25 ENCOUNTER — APPOINTMENT (OUTPATIENT)
Dept: GENERAL RADIOLOGY | Facility: HOSPITAL | Age: 83
DRG: 177 | End: 2023-08-25
Payer: MEDICARE

## 2023-08-25 ENCOUNTER — HOSPITAL ENCOUNTER (INPATIENT)
Facility: HOSPITAL | Age: 83
LOS: 5 days | Discharge: HOME | DRG: 177 | End: 2023-08-31
Attending: EMERGENCY MEDICINE | Admitting: INTERNAL MEDICINE
Payer: MEDICARE

## 2023-08-25 DIAGNOSIS — U07.1 COVID-19: Primary | ICD-10-CM

## 2023-08-25 DIAGNOSIS — G31.84 MILD COGNITIVE IMPAIRMENT: ICD-10-CM

## 2023-08-25 DIAGNOSIS — R53.1 WEAKNESS: ICD-10-CM

## 2023-08-25 DIAGNOSIS — N17.9 AKI (ACUTE KIDNEY INJURY): ICD-10-CM

## 2023-08-25 LAB
ALBUMIN SERPL-MCNC: 4.2 G/DL (ref 3.5–5.2)
ALBUMIN/GLOB SERPL: 1.4 G/DL
ALP SERPL-CCNC: 68 U/L (ref 39–117)
ALT SERPL W P-5'-P-CCNC: 42 U/L (ref 1–41)
ANION GAP SERPL CALCULATED.3IONS-SCNC: 14.7 MMOL/L (ref 5–15)
APTT PPP: 31.5 SECONDS (ref 22.7–35.4)
AST SERPL-CCNC: 113 U/L (ref 1–40)
B PARAPERT DNA SPEC QL NAA+PROBE: NOT DETECTED
B PERT DNA SPEC QL NAA+PROBE: NOT DETECTED
BASOPHILS # BLD AUTO: 0.03 10*3/MM3 (ref 0–0.2)
BASOPHILS NFR BLD AUTO: 0.4 % (ref 0–1.5)
BILIRUB SERPL-MCNC: 0.3 MG/DL (ref 0–1.2)
BUN SERPL-MCNC: 29 MG/DL (ref 8–23)
BUN/CREAT SERPL: 19.3 (ref 7–25)
C PNEUM DNA NPH QL NAA+NON-PROBE: NOT DETECTED
CALCIUM SPEC-SCNC: 8.7 MG/DL (ref 8.6–10.5)
CHLORIDE SERPL-SCNC: 97 MMOL/L (ref 98–107)
CO2 SERPL-SCNC: 24.3 MMOL/L (ref 22–29)
CREAT SERPL-MCNC: 1.5 MG/DL (ref 0.76–1.27)
D-LACTATE SERPL-SCNC: 1.9 MMOL/L (ref 0.5–2)
DEPRECATED RDW RBC AUTO: 44.4 FL (ref 37–54)
EGFRCR SERPLBLD CKD-EPI 2021: 45.3 ML/MIN/1.73
EOSINOPHIL # BLD AUTO: 0 10*3/MM3 (ref 0–0.4)
EOSINOPHIL NFR BLD AUTO: 0 % (ref 0.3–6.2)
ERYTHROCYTE [DISTWIDTH] IN BLOOD BY AUTOMATED COUNT: 13.8 % (ref 12.3–15.4)
FLUAV SUBTYP SPEC NAA+PROBE: NOT DETECTED
FLUBV RNA ISLT QL NAA+PROBE: NOT DETECTED
GLOBULIN UR ELPH-MCNC: 2.9 GM/DL
GLUCOSE SERPL-MCNC: 86 MG/DL (ref 65–99)
HADV DNA SPEC NAA+PROBE: NOT DETECTED
HCOV 229E RNA SPEC QL NAA+PROBE: NOT DETECTED
HCOV HKU1 RNA SPEC QL NAA+PROBE: NOT DETECTED
HCOV NL63 RNA SPEC QL NAA+PROBE: NOT DETECTED
HCOV OC43 RNA SPEC QL NAA+PROBE: NOT DETECTED
HCT VFR BLD AUTO: 39.8 % (ref 37.5–51)
HGB BLD-MCNC: 13.1 G/DL (ref 13–17.7)
HMPV RNA NPH QL NAA+NON-PROBE: NOT DETECTED
HPIV1 RNA ISLT QL NAA+PROBE: NOT DETECTED
HPIV2 RNA SPEC QL NAA+PROBE: NOT DETECTED
HPIV3 RNA NPH QL NAA+PROBE: NOT DETECTED
HPIV4 P GENE NPH QL NAA+PROBE: NOT DETECTED
IMM GRANULOCYTES # BLD AUTO: 0.01 10*3/MM3 (ref 0–0.05)
IMM GRANULOCYTES NFR BLD AUTO: 0.1 % (ref 0–0.5)
INR PPP: 1.05 (ref 0.9–1.1)
LYMPHOCYTES # BLD AUTO: 2.62 10*3/MM3 (ref 0.7–3.1)
LYMPHOCYTES NFR BLD AUTO: 31.6 % (ref 19.6–45.3)
M PNEUMO IGG SER IA-ACNC: NOT DETECTED
MCH RBC QN AUTO: 29.3 PG (ref 26.6–33)
MCHC RBC AUTO-ENTMCNC: 32.9 G/DL (ref 31.5–35.7)
MCV RBC AUTO: 89 FL (ref 79–97)
MONOCYTES # BLD AUTO: 0.93 10*3/MM3 (ref 0.1–0.9)
MONOCYTES NFR BLD AUTO: 11.2 % (ref 5–12)
NEUTROPHILS NFR BLD AUTO: 4.71 10*3/MM3 (ref 1.7–7)
NEUTROPHILS NFR BLD AUTO: 56.7 % (ref 42.7–76)
NRBC BLD AUTO-RTO: 0 /100 WBC (ref 0–0.2)
PLATELET # BLD AUTO: 245 10*3/MM3 (ref 140–450)
PMV BLD AUTO: 10.3 FL (ref 6–12)
POTASSIUM SERPL-SCNC: 4 MMOL/L (ref 3.5–5.2)
PROCALCITONIN SERPL-MCNC: 0.32 NG/ML (ref 0–0.25)
PROT SERPL-MCNC: 7.1 G/DL (ref 6–8.5)
PROTHROMBIN TIME: 13.9 SECONDS (ref 11.7–14.2)
RBC # BLD AUTO: 4.47 10*6/MM3 (ref 4.14–5.8)
RHINOVIRUS RNA SPEC NAA+PROBE: NOT DETECTED
RSV RNA NPH QL NAA+NON-PROBE: NOT DETECTED
SARS-COV-2 RNA NPH QL NAA+NON-PROBE: DETECTED
SODIUM SERPL-SCNC: 136 MMOL/L (ref 136–145)
WBC NRBC COR # BLD: 8.3 10*3/MM3 (ref 3.4–10.8)

## 2023-08-25 PROCEDURE — 71045 X-RAY EXAM CHEST 1 VIEW: CPT

## 2023-08-25 PROCEDURE — 99285 EMERGENCY DEPT VISIT HI MDM: CPT

## 2023-08-25 PROCEDURE — 85025 COMPLETE CBC W/AUTO DIFF WBC: CPT | Performed by: EMERGENCY MEDICINE

## 2023-08-25 PROCEDURE — 0202U NFCT DS 22 TRGT SARS-COV-2: CPT | Performed by: EMERGENCY MEDICINE

## 2023-08-25 PROCEDURE — 36415 COLL VENOUS BLD VENIPUNCTURE: CPT

## 2023-08-25 PROCEDURE — 84145 PROCALCITONIN (PCT): CPT | Performed by: EMERGENCY MEDICINE

## 2023-08-25 PROCEDURE — G0378 HOSPITAL OBSERVATION PER HR: HCPCS

## 2023-08-25 PROCEDURE — 83605 ASSAY OF LACTIC ACID: CPT | Performed by: EMERGENCY MEDICINE

## 2023-08-25 PROCEDURE — 80053 COMPREHEN METABOLIC PANEL: CPT | Performed by: EMERGENCY MEDICINE

## 2023-08-25 PROCEDURE — 85730 THROMBOPLASTIN TIME PARTIAL: CPT | Performed by: EMERGENCY MEDICINE

## 2023-08-25 PROCEDURE — 87040 BLOOD CULTURE FOR BACTERIA: CPT | Performed by: EMERGENCY MEDICINE

## 2023-08-25 PROCEDURE — 85610 PROTHROMBIN TIME: CPT | Performed by: EMERGENCY MEDICINE

## 2023-08-25 RX ORDER — SODIUM CHLORIDE 9 MG/ML
125 INJECTION, SOLUTION INTRAVENOUS CONTINUOUS
Status: DISCONTINUED | OUTPATIENT
Start: 2023-08-25 | End: 2023-08-27

## 2023-08-25 RX ORDER — SODIUM CHLORIDE 0.9 % (FLUSH) 0.9 %
10 SYRINGE (ML) INJECTION AS NEEDED
Status: DISCONTINUED | OUTPATIENT
Start: 2023-08-25 | End: 2023-08-31 | Stop reason: HOSPADM

## 2023-08-25 RX ORDER — ACETAMINOPHEN 500 MG
1000 TABLET ORAL ONCE
Status: COMPLETED | OUTPATIENT
Start: 2023-08-25 | End: 2023-08-25

## 2023-08-25 RX ADMIN — SODIUM CHLORIDE 125 ML/HR: 9 INJECTION, SOLUTION INTRAVENOUS at 22:25

## 2023-08-25 RX ADMIN — ACETAMINOPHEN 1000 MG: 500 TABLET ORAL at 21:38

## 2023-08-26 PROBLEM — N17.9 AKI (ACUTE KIDNEY INJURY): Status: ACTIVE | Noted: 2023-08-26

## 2023-08-26 LAB
ANION GAP SERPL CALCULATED.3IONS-SCNC: 12.7 MMOL/L (ref 5–15)
BACTERIA UR QL AUTO: ABNORMAL /HPF
BILIRUB UR QL STRIP: NEGATIVE
BUN SERPL-MCNC: 31 MG/DL (ref 8–23)
BUN/CREAT SERPL: 22.8 (ref 7–25)
CALCIUM SPEC-SCNC: 8.3 MG/DL (ref 8.6–10.5)
CHLORIDE SERPL-SCNC: 101 MMOL/L (ref 98–107)
CLARITY UR: ABNORMAL
CO2 SERPL-SCNC: 25.3 MMOL/L (ref 22–29)
COLOR UR: YELLOW
CREAT SERPL-MCNC: 1.36 MG/DL (ref 0.76–1.27)
DEPRECATED RDW RBC AUTO: 42.9 FL (ref 37–54)
EGFRCR SERPLBLD CKD-EPI 2021: 51 ML/MIN/1.73
ERYTHROCYTE [DISTWIDTH] IN BLOOD BY AUTOMATED COUNT: 13.5 % (ref 12.3–15.4)
GLUCOSE SERPL-MCNC: 105 MG/DL (ref 65–99)
GLUCOSE UR STRIP-MCNC: NEGATIVE MG/DL
GRAN CASTS URNS QL MICRO: ABNORMAL /LPF
HCT VFR BLD AUTO: 33.4 % (ref 37.5–51)
HGB BLD-MCNC: 11.5 G/DL (ref 13–17.7)
HGB UR QL STRIP.AUTO: ABNORMAL
HYALINE CASTS UR QL AUTO: ABNORMAL /LPF
KETONES UR QL STRIP: ABNORMAL
LEUKOCYTE ESTERASE UR QL STRIP.AUTO: NEGATIVE
MCH RBC QN AUTO: 29.6 PG (ref 26.6–33)
MCHC RBC AUTO-ENTMCNC: 34.4 G/DL (ref 31.5–35.7)
MCV RBC AUTO: 86.1 FL (ref 79–97)
NITRITE UR QL STRIP: NEGATIVE
PH UR STRIP.AUTO: <=5 [PH] (ref 5–8)
PLATELET # BLD AUTO: 206 10*3/MM3 (ref 140–450)
PMV BLD AUTO: 10.4 FL (ref 6–12)
POTASSIUM SERPL-SCNC: 3.8 MMOL/L (ref 3.5–5.2)
PROT UR QL STRIP: ABNORMAL
RBC # BLD AUTO: 3.88 10*6/MM3 (ref 4.14–5.8)
RBC # UR STRIP: ABNORMAL /HPF
REF LAB TEST METHOD: ABNORMAL
SODIUM SERPL-SCNC: 139 MMOL/L (ref 136–145)
SP GR UR STRIP: 1.02 (ref 1–1.03)
SQUAMOUS #/AREA URNS HPF: ABNORMAL /HPF
UROBILINOGEN UR QL STRIP: ABNORMAL
WBC # UR STRIP: ABNORMAL /HPF
WBC NRBC COR # BLD: 6.46 10*3/MM3 (ref 3.4–10.8)

## 2023-08-26 PROCEDURE — 81001 URINALYSIS AUTO W/SCOPE: CPT | Performed by: EMERGENCY MEDICINE

## 2023-08-26 PROCEDURE — 97530 THERAPEUTIC ACTIVITIES: CPT

## 2023-08-26 PROCEDURE — 85027 COMPLETE CBC AUTOMATED: CPT | Performed by: NURSE PRACTITIONER

## 2023-08-26 PROCEDURE — 97110 THERAPEUTIC EXERCISES: CPT

## 2023-08-26 PROCEDURE — 97161 PT EVAL LOW COMPLEX 20 MIN: CPT

## 2023-08-26 PROCEDURE — 80048 BASIC METABOLIC PNL TOTAL CA: CPT | Performed by: NURSE PRACTITIONER

## 2023-08-26 PROCEDURE — 25010000002 ENOXAPARIN PER 10 MG: Performed by: NURSE PRACTITIONER

## 2023-08-26 RX ORDER — NITROGLYCERIN 0.4 MG/1
0.4 TABLET SUBLINGUAL
Status: DISCONTINUED | OUTPATIENT
Start: 2023-08-26 | End: 2023-08-31 | Stop reason: HOSPADM

## 2023-08-26 RX ORDER — BENZONATATE 100 MG/1
200 CAPSULE ORAL 3 TIMES DAILY PRN
Status: DISCONTINUED | OUTPATIENT
Start: 2023-08-26 | End: 2023-08-31 | Stop reason: HOSPADM

## 2023-08-26 RX ORDER — CALCIUM CARBONATE 500 MG/1
2 TABLET, CHEWABLE ORAL 2 TIMES DAILY PRN
Status: DISCONTINUED | OUTPATIENT
Start: 2023-08-26 | End: 2023-08-31 | Stop reason: HOSPADM

## 2023-08-26 RX ORDER — ACETAMINOPHEN 325 MG/1
650 TABLET ORAL EVERY 4 HOURS PRN
Status: DISCONTINUED | OUTPATIENT
Start: 2023-08-26 | End: 2023-08-31 | Stop reason: HOSPADM

## 2023-08-26 RX ORDER — ONDANSETRON 4 MG/1
4 TABLET, FILM COATED ORAL EVERY 6 HOURS PRN
Status: DISCONTINUED | OUTPATIENT
Start: 2023-08-26 | End: 2023-08-31 | Stop reason: HOSPADM

## 2023-08-26 RX ORDER — SODIUM CHLORIDE 0.9 % (FLUSH) 0.9 %
10 SYRINGE (ML) INJECTION AS NEEDED
Status: DISCONTINUED | OUTPATIENT
Start: 2023-08-26 | End: 2023-08-31 | Stop reason: HOSPADM

## 2023-08-26 RX ORDER — SODIUM CHLORIDE 9 MG/ML
40 INJECTION, SOLUTION INTRAVENOUS AS NEEDED
Status: DISCONTINUED | OUTPATIENT
Start: 2023-08-26 | End: 2023-08-31 | Stop reason: HOSPADM

## 2023-08-26 RX ORDER — DEXTROMETHORPHAN POLISTIREX 30 MG/5ML
60 SUSPENSION ORAL EVERY 12 HOURS SCHEDULED
Status: DISCONTINUED | OUTPATIENT
Start: 2023-08-26 | End: 2023-08-31 | Stop reason: HOSPADM

## 2023-08-26 RX ORDER — NITROGLYCERIN 0.4 MG/1
0.4 TABLET SUBLINGUAL
Status: DISCONTINUED | OUTPATIENT
Start: 2023-08-26 | End: 2023-08-26 | Stop reason: SDUPTHER

## 2023-08-26 RX ORDER — ACETAMINOPHEN 650 MG/1
650 SUPPOSITORY RECTAL EVERY 4 HOURS PRN
Status: DISCONTINUED | OUTPATIENT
Start: 2023-08-26 | End: 2023-08-31 | Stop reason: HOSPADM

## 2023-08-26 RX ORDER — ACETAMINOPHEN 160 MG/5ML
650 SOLUTION ORAL EVERY 4 HOURS PRN
Status: DISCONTINUED | OUTPATIENT
Start: 2023-08-26 | End: 2023-08-31 | Stop reason: HOSPADM

## 2023-08-26 RX ORDER — ONDANSETRON 2 MG/ML
4 INJECTION INTRAMUSCULAR; INTRAVENOUS EVERY 6 HOURS PRN
Status: DISCONTINUED | OUTPATIENT
Start: 2023-08-26 | End: 2023-08-31 | Stop reason: HOSPADM

## 2023-08-26 RX ORDER — SODIUM CHLORIDE 0.9 % (FLUSH) 0.9 %
10 SYRINGE (ML) INJECTION EVERY 12 HOURS SCHEDULED
Status: DISCONTINUED | OUTPATIENT
Start: 2023-08-26 | End: 2023-08-31 | Stop reason: HOSPADM

## 2023-08-26 RX ORDER — ENOXAPARIN SODIUM 100 MG/ML
40 INJECTION SUBCUTANEOUS EVERY 24 HOURS
Status: DISCONTINUED | OUTPATIENT
Start: 2023-08-26 | End: 2023-08-31 | Stop reason: HOSPADM

## 2023-08-26 RX ADMIN — BENZONATATE 200 MG: 100 CAPSULE ORAL at 22:25

## 2023-08-26 RX ADMIN — Medication 10 ML: at 01:36

## 2023-08-26 RX ADMIN — ENOXAPARIN SODIUM 40 MG: 100 INJECTION SUBCUTANEOUS at 08:06

## 2023-08-26 RX ADMIN — NIRMATRELVIR AND RITONAVIR 2 EACH: KIT at 20:23

## 2023-08-26 RX ADMIN — Medication 10 ML: at 20:24

## 2023-08-26 RX ADMIN — NIRMATRELVIR AND RITONAVIR 2 EACH: KIT at 08:06

## 2023-08-26 RX ADMIN — SODIUM CHLORIDE 125 ML/HR: 9 INJECTION, SOLUTION INTRAVENOUS at 01:36

## 2023-08-26 RX ADMIN — SODIUM CHLORIDE 125 ML/HR: 9 INJECTION, SOLUTION INTRAVENOUS at 14:38

## 2023-08-26 RX ADMIN — Medication 10 ML: at 08:19

## 2023-08-26 NOTE — PLAN OF CARE
Goal Outcome Evaluation:  Plan of Care Reviewed With: patient        Progress: no change  Outcome Evaluation: VSS, on RA. Tolerating paxlovid well. Eating approx 50% of meals. Daughter voiced concerns about wanting patient to go to SNF/rehab d/t patient living alone and not being able to take care of himself. Voiding well per urinal. Remains on IVF. Worked well with PT/OT. continuing plan of care

## 2023-08-26 NOTE — PLAN OF CARE
Goal Outcome Evaluation:  Plan of Care Reviewed With: patient        Progress: no change  Outcome Evaluation: New admit from ER with Covid and weakness, remains on room air, bed alarm on, coccyx pink/blanchabe, z guard at bedside, starting on Paxlovid in am, lives alone, needs placement per family, CCP consulted, will continue to monitor

## 2023-08-26 NOTE — PROGRESS NOTES
"[facility] Clinical Pharmacy Services: Enoxaparin Consult    Jewel Best has a pharmacy consult to dose prophylactic enoxaparin per Dulce Bass's request.     Indication: VTE Prophylaxis  Home Anticoagulation: none     Relevant clinical data and objective history reviewed:  83 y.o. male 179.1 cm (70.5\") 80.7 kg (178 lb)   Body mass index is 25.18 kg/m².   Results from last 7 days   Lab Units 08/25/23  2113   PLATELETS 10*3/mm3 245     Estimated Creatinine Clearance: 41.1 mL/min (A) (by C-G formula based on SCr of 1.5 mg/dL (H)).    Assessment/Plan    Will start patient on 40mg subcutaneous every 24 hours, adjusted for renal function. Consult order will be discontinued but pharmacy will continue to follow.     Magdiel Goodrich, [facility]  Clinical Pharmacist   "

## 2023-08-26 NOTE — ED NOTES
.Nursing report ED to floor  Jewel Best  83 y.o.  male    HPI :   Chief Complaint   Patient presents with    Weakness - Generalized    Flu Symptoms       Admitting doctor:   Brannon Holder MD    Admitting diagnosis:   The primary encounter diagnosis was COVID-19. Diagnoses of VETO (acute kidney injury) and Weakness were also pertinent to this visit.    Code status:   Current Code Status       Date Active Code Status Order ID Comments User Context       Not on file            Allergies:   Patient has no known allergies.    Isolation:   Enhanced Droplet/Contact     Intake and Output  No intake or output data in the 24 hours ending 08/25/23 2319    Weight:       08/25/23 2046   Weight: 80.7 kg (178 lb)       Most recent vitals:   Vitals:    08/25/23 2141 08/25/23 2201 08/25/23 2231 08/25/23 2300   BP: 115/64 117/68 113/65 99/56   BP Location: Right arm      Patient Position: Lying      Pulse: 91 87 85 87   Resp: 18      Temp: 98.7 °F (37.1 °C)      TempSrc: Oral      SpO2: 97% 90% 94% 92%   Weight:       Height:           Active LDAs/IV Access:   Lines, Drains & Airways       Active LDAs       Name Placement date Placement time Site Days    Peripheral IV 08/25/23 2110 Left Antecubital 08/25/23 2110  Antecubital  less than 1                    Labs (abnormal labs have a star):   Labs Reviewed   RESPIRATORY PANEL PCR W/ COVID-19 (SARS-COV-2) NERY/KODY/ELIZABETH/PAD/COR/MAD/TUCKER IN-HOUSE, NP SWAB IN UTM/VTP, 3-4 HR TAT - Abnormal; Notable for the following components:       Result Value    COVID19 Detected (*)     All other components within normal limits    Narrative:     In the setting of a positive respiratory panel with a viral infection PLUS a negative procalcitonin without other underlying concern for bacterial infection, consider observing off antibiotics or discontinuation of antibiotics and continue supportive care. If the respiratory panel is positive for atypical bacterial infection (Bordetella pertussis,  "Chlamydophila pneumoniae, or Mycoplasma pneumoniae), consider antibiotic de-escalation to target atypical bacterial infection.   COMPREHENSIVE METABOLIC PANEL - Abnormal; Notable for the following components:    BUN 29 (*)     Creatinine 1.50 (*)     Chloride 97 (*)     ALT (SGPT) 42 (*)     AST (SGOT) 113 (*)     eGFR 45.3 (*)     All other components within normal limits    Narrative:     GFR Normal >60  Chronic Kidney Disease <60  Kidney Failure <15    The GFR formula is only valid for adults with stable renal function between ages 18 and 70.   PROCALCITONIN - Abnormal; Notable for the following components:    Procalcitonin 0.32 (*)     All other components within normal limits    Narrative:     As a Marker for Sepsis (Non-Neonates):    1. <0.5 ng/mL represents a low risk of severe sepsis and/or septic shock.  2. >2 ng/mL represents a high risk of severe sepsis and/or septic shock.    As a Marker for Lower Respiratory Tract Infections that require antibiotic therapy:    PCT on Admission    Antibiotic Therapy       6-12 Hrs later    >0.5                Strongly Recommended  >0.25 - <0.5        Recommended   0.1 - 0.25          Discouraged              Remeasure/reassess PCT  <0.1                Strongly Discouraged     Remeasure/reassess PCT    As 28 day mortality risk marker: \"Change in Procalcitonin Result\" (>80% or <=80%) if Day 0 (or Day 1) and Day 4 values are available. Refer to http://www.Addup[employer]-pct-calculator.com    Change in PCT <=80%  A decrease of PCT levels below or equal to 80% defines a positive change in PCT test result representing a higher risk for 28-day all-cause mortality of patients diagnosed with severe sepsis for septic shock.    Change in PCT >80%  A decrease of PCT levels of more than 80% defines a negative change in PCT result representing a lower risk for 28-day all-cause mortality of patients diagnosed with severe sepsis or septic shock.      CBC WITH AUTO DIFFERENTIAL - Abnormal; " Notable for the following components:    Eosinophil % 0.0 (*)     Monocytes, Absolute 0.93 (*)     All other components within normal limits   PROTIME-INR - Normal   APTT - Normal   LACTIC ACID, PLASMA - Normal   BLOOD CULTURE   BLOOD CULTURE   URINALYSIS W/ MICROSCOPIC IF INDICATED (NO CULTURE)   CBC AND DIFFERENTIAL    Narrative:     The following orders were created for panel order CBC & Differential.  Procedure                               Abnormality         Status                     ---------                               -----------         ------                     CBC Auto Differential[[id number]]        Abnormal            Final result                 Please view results for these tests on the individual orders.       EKG:   No orders to display       Meds given in ED:   Medications   sodium chloride 0.9 % flush 10 mL (has no administration in time range)   sodium chloride 0.9 % infusion (125 mL/hr Intravenous New Bag 23)   acetaminophen (TYLENOL) tablet 1,000 mg (1,000 mg Oral Given 23)       Imaging results:  XR Chest 1 View    Result Date: 2023  FINDINGS AND IMPRESSION: Bibasilar pulmonary pacification is present, left greater than right, suggestive of atelectasis, pneumonia and/or edema in the appropriate clinical context and correlation with patient history is recommended with follow-up chest CT if clinically indicated. No pneumothorax is seen. Suggestion of a large hiatal hernia. Cardiac silhouette is at the upper limits of normal borderline enlarged.  This report was finalized on 2023 10:41 PM by Dr. Keron Briceño M.D.       Ambulatory status:   - up with assistance to     Social issues:   Social History     Socioeconomic History    Marital status:    Tobacco Use    Smoking status: Former     Packs/day: 1.00     Years: 10.00     Pack years: 10.00     Types: Cigarettes     Quit date: 1971     Years since quittin.7     Passive exposure: Past     Smokeless tobacco: Never   Vaping Use    Vaping Use: Never used   Substance and Sexual Activity    Alcohol use: No    Sexual activity: Defer       NIH Stroke Scale:       Callie Sinclair RN  08/25/23 23:19 EDT

## 2023-08-26 NOTE — THERAPY EVALUATION
Patient Name: Jewel Best  : 1940    MRN: [id number]                              Today's Date: 2023       Admit Date: 2023    Visit Dx:     ICD-10-CM ICD-9-CM   1. COVID-19  U07.1 079.89   2. VETO (acute kidney injury)  N17.9 584.9   3. Weakness  R53.1 780.79     Patient Active Problem List   Diagnosis    Anemia    Anxiety disorder    Benign essential hypertension    Eczema    Hyperlipidemia    Fistula of skin    Lymphoma in remission    Body mass index (BMI) 30.0-30.9, adult     Major depression, single episode    COVID-19    VETO (acute kidney injury)     Past Medical History:   Diagnosis Date    Anemia     Anxiety     Bladder cancer     Hyperlipidemia     Hypertension     Lymphoma      Past Surgical History:   Procedure Laterality Date    KNEE SURGERY      bakers cyst removal      General Information       Row Name 23 1252          Physical Therapy Time and Intention    Document Type evaluation;therapy note (daily note)  -MW     Mode of Treatment physical therapy  -MW       Row Name 23 1252          General Information    Patient Profile Reviewed yes  -MW     Prior Level of Function independent:;gait  -MW     Existing Precautions/Restrictions fall  -MW       Row Name 23 1252          Living Environment    People in Home alone  -MW       Row Name 23 1252          Cognition    Orientation Status (Cognition) oriented x 3  -MW       Row Name 23 1252          Safety Issues, Functional Mobility    Impairments Affecting Function (Mobility) balance;cognition;endurance/activity tolerance;strength  -MW     Comment, Safety Issues/Impairments (Mobility) Gait belt and shoes on for safety  -MW               User Key  (r) = Recorded By, (t) = Taken By, (c) = Cosigned By      Initials Name Provider Type    MW Brynn Hernandez, PT Physical Therapist                   Mobility       Row Name 23 1254          Bed Mobility    Bed Mobility supine-sit-supine  -MW     Supine-Sit  [place] (Bed Mobility) --  -     Supine-Sit-Supine [place] (Bed Mobility) standby assist  -     Assistive Device (Bed Mobility) bed rails  -       Row Name 08/26/23 1254          Transfers    Comment, (Transfers) Toilet transfer CGA  -[facility] Name 08/26/23 1254          Sit-Stand Transfer    Sit-Stand [place] (Transfers) contact guard;supervision  -MW       Row Name 08/26/23 1254          Gait/Stairs (Locomotion)    [place] Level (Gait) contact guard;minimum assist (75% patient effort)  -     Distance in Feet (Gait) 2 x 15 ft  -     Deviations/Abnormal Patterns (Gait) bilateral deviations;stride length decreased;gait speed decreased  -     Bilateral Gait Deviations forward flexed posture  -     Comment, (Gait/Stairs) Min unsteady, reaches out for support of wall or furniture  -               User Key  (r) = Recorded By, (t) = Taken By, (c) = Cosigned By      Initials Name Provider Type     Brynn Hernandez PT Physical Therapist                   Obj/Interventions       [facility] Name 08/26/23 1304          Range of Motion Comprehensive    General Range of Motion bilateral lower extremity ROM WFL  -MW       Row Name 08/26/23 1304          Strength Comprehensive (MMT)    General Manual Muscle Testing (MMT) Assessment lower extremity strength deficits identified  -     Comment, General Manual Muscle Testing (MMT) Assessment Generalized weaknewss BLE  -[facility] Name 08/26/23 1304          Balance    Balance Assessment sitting static balance;sitting dynamic balance;standing static balance;standing dynamic balance  -     Static Sitting Balance modified independence  -     Dynamic Sitting Balance modified independence  -     Position, Sitting Balance unsupported;sitting edge of bed  -     Static Standing Balance standby assist  -     Dynamic Standing Balance contact guard;minimal assist  -     Position/Device Used, Standing Balance unsupported  -     Balance  Interventions sitting;standing;sit to stand;static;dynamic;occupation based/functional task  -MW               User Key  (r) = Recorded By, (t) = Taken By, (c) = Cosigned By      Initials Name Provider Type    Brynn Rivas PT Physical Therapist                   Goals/Plan       Row Name 08/26/23 8582          Bed Mobility Goal 1 (PT)    Activity/Assistive Device (Bed Mobility Goal 1, PT) bed mobility activities, all  -MW     [place] Level/Cues Needed (Bed Mobility Goal 1, PT) modified independence  -MW     Time Frame (Bed Mobility Goal 1, PT) 1 week  -MW       Row Name 08/26/23 1335          Transfer Goal 1 (PT)    Activity/Assistive Device (Transfer Goal 1, PT) transfers, all;other (see comments);walker, rolling  Or least restrictive AD  -MW     [place] Level/Cues Needed (Transfer Goal 1, PT) supervision required  -MW     Time Frame (Transfer Goal 1, PT) 1 week  -MW       Row Name 08/26/23 6195          Gait Training Goal 1 (PT)    Activity/Assistive Device (Gait Training Goal 1, PT) gait (walking locomotion);walker, rolling;other (see comments)  Or least restrictive AD  -MW     Wise Level (Gait Training Goal 1, PT) supervision required  -MW     Distance (Gait Training Goal 1, PT) 100  -MW     Time Frame (Gait Training Goal 1, PT) 1 week  -MW       Row Name 08/26/23 0455          Therapy Assessment/Plan (PT)    Planned Therapy Interventions (PT) balance training;bed mobility training;gait training;patient/family education;postural re-education;strengthening;transfer training  -MW               User Key  (r) = Recorded By, (t) = Taken By, (c) = Cosigned By      Initials Name Provider Type    Brynn Rivas PT Physical Therapist                   Clinical Impression       Row Name 08/26/23 8267          Pain    Pretreatment Pain Rating 0/10 - no pain  -MW     Posttreatment Pain Rating 0/10 - no pain  -MW       Row Name 08/26/23 4395          Plan of Care Review    Plan of Care Reviewed  With patient  -MW     Outcome Evaluation Patient is an 84 yo male admitted with VETO and COVID. He lives alone and is I with mobility at baseline. He presents with impaired balance and functional mobility. Today he performed bed mobility with SBA, STS and toilet transfer with CGA/Sup A. He was able to ambulate to and from restroom with CG/Min A then sat at EOB to eat his lunch. Acute PT indicated to address functional deficits. At this time PT recommends SNF for discharge placement and RW for mobility.  -       Row Name 08/26/23 5836          Therapy Assessment/Plan (PT)    Rehab Potential (PT) good, to achieve stated therapy goals  -MW     Criteria for Skilled Interventions Met (PT) yes;meets criteria;skilled treatment is necessary  -MW     Therapy Frequency (PT) 5 times/wk  -       Row Name 08/26/23 1302          Vital Signs    O2 Delivery Pre Treatment room air  -MW     O2 Delivery Intra Treatment room air  -MW     O2 Delivery Post Treatment room air  -MW     Pre Patient Position Supine  -MW     Intra Patient Position Standing  -MW     Post Patient Position Supine  -MW       Row Name 08/26/23 0543          Positioning and Restraints    Pre-Treatment Position in bed  -MW     Post Treatment Position bed  -MW     In Bed call light within reach;encouraged to call for assist;exit alarm on;supine  Lines intact, needs met  -               User Key  (r) = Recorded By, (t) = Taken By, (c) = Cosigned By      Initials Name Provider Type    Brynn Rivas, PT Physical Therapist                   Outcome Measures       Row Name 08/26/23 0940          How much help from another person do you currently need...    Turning from your back to your side while in flat bed without using bedrails? 4  -MW     Moving from lying on back to sitting on the side of a flat bed without bedrails? 3  -MW     Moving to and from a bed to a chair (including a wheelchair)? 3  -MW     Standing up from a chair using your arms (e.g.,  wheelchair, bedside chair)? 3  -MW     Climbing 3-5 steps with a railing? 2  -MW     To walk in hospital room? 3  -MW     AM-PAC 6 Clicks Score (PT) 18  -MW     Highest level of mobility 6 --> Walked 10 steps or more  -       Row Name 08/26/23 1337          Functional Assessment    Outcome Measure Options AM-PAC 6 Clicks Basic Mobility (PT)  -               User Key  (r) = Recorded By, (t) = Taken By, (c) = Cosigned By      Initials Name Provider Type     Brynn Hernandez PT Physical Therapist                                 Physical Therapy Education       Title: PT OT SLP Therapies (In Progress)       Topic: Physical Therapy (In Progress)       Point: Mobility training (Done)       Learning Progress Summary             Patient Acceptance, E, VU,NR by  at 8/26/2023 1338                         Point: Home exercise program (Not Started)       Learner Progress:  Not documented in this visit.              Point: Body mechanics (Not Started)       Learner Progress:  Not documented in this visit.              Point: Precautions (Done)       Learning Progress Summary             Patient Acceptance, E, VU,NR by  at 8/26/2023 1338                                         User Key       Initials Effective Dates Name Provider Type Discipline     06/16/21 -  Brynn Hernandez PT Physical Therapist PT                  PT Recommendation and Plan  Planned Therapy Interventions (PT): balance training, bed mobility training, gait training, patient/family education, postural re-education, strengthening, transfer training  Plan of Care Reviewed With: patient  Outcome Evaluation: Patient is an 82 yo male admitted with VETO and COVID. He lives alone and is I with mobility at baseline. He presents with impaired balance and functional mobility. Today he performed bed mobility with SBA, STS and toilet transfer with CGA/Sup A. He was able to ambulate to and from restroom with CG/Min A then sat at EOB to eat his lunch. Acute PT  indicated to address functional deficits. At this time PT recommends SNF for discharge placement and RW for mobility.     Time Calculation:   PT Evaluation Complexity  History, PT Evaluation Complexity: 3 or more personal factors and/or comorbidities  Examination of Body Systems (PT Eval Complexity): 1-2 elements  Clinical Presentation (PT Evaluation Complexity): stable  Clinical Decision Making (PT Evaluation Complexity): low complexity  Overall Complexity (PT Evaluation Complexity): low complexity     PT Charges       Row Name 08/26/23 1339             Time Calculation    Start Time 1236  -MW      Stop Time 1325  -MW      Time Calculation (min) 49 min  -MW      PT Received On 08/26/23  -MW      PT - Next Appointment 08/28/23  -MW      PT Goal Re-Cert Due Date 09/09/23  -MW         Time Calculation- PT    Total Timed Code Minutes- PT 40 minute(s)  -MW                User Key  (r) = Recorded By, (t) = Taken By, (c) = Cosigned By      Initials Name Provider Type    MW Brynn Hernandez, PT Physical Therapist                  Therapy Charges for Today       Code Description Service Date Service Provider Modifiers Qty    84089042216 HC PT EVAL LOW COMPLEXITY 2 8/26/2023 Brynn Hernandez, PT GP 1    01341859385 HC PT THER PROC EA 15 MIN 8/26/2023 Brynn Hernandez, PT GP 2    08504980943 HC PT THERAPEUTIC ACT EA 15 MIN 8/26/2023 Brynn Hernandez, PT GP 1            PT G-Codes  Outcome Measure Options: AM-PAC 6 Clicks Basic Mobility (PT)  AM-PAC 6 Clicks Score (PT): 18  PT Discharge Summary  Anticipated Discharge Disposition (PT): skilled nursing facility    Brynn Hernandez PT  8/26/2023

## 2023-08-26 NOTE — ED NOTES
Patient to ED via EMS from home. Patient c/o weakness, sore throat, productive cough, loss of appetite x 3 days. Patient has low grade fever per EMS. Patient has facial droop at baseline.

## 2023-08-26 NOTE — H&P
Patient Name:  Jewel Best  YOB: 1940  MRN:  [id number]  Admit Date:  8/25/2023  Patient Care Team:  Ernie Panda MD as PCP - General  Ernie Panda MD as PCP - Family Medicine      Subjective   History Present Illness     Chief Complaint   Patient presents with    Weakness - Generalized    Flu Symptoms       Mr. Best is a 83 y.o. former smoker with a history of hypertension, hyperlipidemia, bladder cancer, lymphoma, and anemia that presents to [facility] complaining of generalized weakness and decreased appetite.  He is somewhat of a poor historian but reports he has had progressively worsening generalized weakness and decreased PO intake for the past one week.  He reported in the ED that he has had some congestion and cough but denies these symptoms on exam.  He states his daughter attempted to call him several times yesterday but he did not answer, so she called EMS.  Per ED notes, he was found to have stool incontinence when he was found.  The patient denies chest pain, shortness of breath, and chills. EMS reported he was febrile en route.  Work up in the ED revealed creatinine 1.50, BUN 29, , ALT 42, and procal 0.32.  A respiratory viral panel was positive for COVID-19 and a chest x-ray showed bibasilar pulmonary opacification, left greater than right.  The patient reports he has not been vaccinated for COVID-19.      History of Present Illness  Review of Systems   Constitutional:  Positive for appetite change. Negative for chills and fever.   HENT:  Negative for congestion and sore throat.    Eyes:  Negative for photophobia and visual disturbance.   Respiratory:  Negative for cough, chest tightness, shortness of breath and wheezing.    Cardiovascular:  Negative for chest pain, palpitations and leg swelling.   Gastrointestinal:  Negative for abdominal distention, nausea and vomiting.   Genitourinary:  Negative for decreased urine volume,  dysuria, flank pain, frequency, hematuria and urgency.   Musculoskeletal:  Negative for arthralgias and myalgias.   Skin:  Negative for color change, rash and wound.   Neurological:  Positive for weakness (generalized). Negative for dizziness, light-headedness, numbness and headaches.      Personal History     Past Medical History:   Diagnosis Date    Anemia     Anxiety     Bladder cancer     Hyperlipidemia     Hypertension     Lymphoma      Past Surgical History:   Procedure Laterality Date    KNEE SURGERY      bakers cyst removal     Family History   Family history unknown: Yes     Social History     Tobacco Use    Smoking status: Former     Packs/day: 1.00     Years: 10.00     Pack years: 10.00     Types: Cigarettes     Quit date: 1971     Years since quittin.7     Passive exposure: Past    Smokeless tobacco: Never   Vaping Use    Vaping Use: Never used   Substance Use Topics    Alcohol use: No    Drug use: Never     Facility-Administered Medications Prior to Admission   Medication Dose Route Frequency Provider Last Rate Last Admin    cyanocobalamin injection 1,000 mcg  1,000 mcg Intramuscular Q30 Days Ernie Panda MD         Medications Prior to Admission   Medication Sig Dispense Refill Last Dose    DULoxetine (CYMBALTA) 60 MG capsule Take 1 capsule by mouth Daily. (Patient not taking: Reported on 2023) 30 capsule 5 Not Taking    lisinopril (PRINIVIL,ZESTRIL) 20 MG tablet Take 1 tablet by mouth Daily. (Patient not taking: Reported on 2023) 30 tablet 5 Not Taking     Allergies:  No Known Allergies    Objective    Objective     Vital Signs  Temp:  [97.9 °F (36.6 °C)-100.2 °F (37.9 °C)] 97.9 °F (36.6 °C)  Heart Rate:  [79-95] 79  Resp:  [18] 18  BP: ()/(56-68) 118/59  SpO2:  [90 %-98 %] 93 %  on   ;   Device (Oxygen Therapy): room air  Body mass index is 25.18 kg/m².    Physical Exam  Vitals and nursing note reviewed.   Constitutional:       Appearance: Normal appearance.    HENT:      Head: Normocephalic and atraumatic.      Nose: Nose normal.      Mouth/Throat:      Mouth: Mucous membranes are moist.      Pharynx: Oropharynx is clear.   Eyes:      Extraocular Movements: Extraocular movements intact.      Conjunctiva/sclera: Conjunctivae normal.   Cardiovascular:      Rate and Rhythm: Normal rate and regular rhythm.      Pulses: Normal pulses.      Heart sounds: Normal heart sounds.   Pulmonary:      Effort: Pulmonary effort is normal.      Breath sounds: Normal breath sounds.   Abdominal:      General: Bowel sounds are normal.      Palpations: Abdomen is soft.   Musculoskeletal:         General: No swelling. Normal range of motion.      Cervical back: Normal range of motion and neck supple.   Skin:     General: Skin is warm and dry.   Neurological:      General: No focal deficit present.      Mental Status: He is alert and oriented to person, place, and time.   Psychiatric:         Mood and Affect: Mood normal.         Behavior: Behavior normal.       Results Review:  I reviewed the patient's new clinical results.  I reviewed the patient's new imaging results and agree with the interpretation.  I reviewed the patient's other test results and agree with the interpretation  I personally viewed and interpreted the patient's EKG/Telemetry data  Discussed with ED provider.    Lab Results (last 24 hours)       Procedure Component Value Units Date/Time    Respiratory Panel PCR w/COVID-19(SARS-CoV-2) NERY/KODY/ELIZABETH/PAD/COR/MAD/TUCKER In-House, NP Swab in UTM/VTM, 3-4 HR TAT - Swab, Nasopharynx [[id number]]  (Abnormal) Collected: 08/25/23 2108    Specimen: Swab from Nasopharynx Updated: 08/25/23 2242     ADENOVIRUS, PCR Not Detected     Coronavirus 229E Not Detected     Coronavirus HKU1 Not Detected     Coronavirus NL63 Not Detected     Coronavirus OC43 Not Detected     COVID19 Detected     Human Metapneumovirus Not Detected     Human Rhinovirus/Enterovirus Not Detected     Influenza A PCR Not  Detected     Influenza B PCR Not Detected     Parainfluenza Virus 1 Not Detected     Parainfluenza Virus 2 Not Detected     Parainfluenza Virus 3 Not Detected     Parainfluenza Virus 4 Not Detected     RSV, PCR Not Detected     Bordetella pertussis pcr Not Detected     Bordetella parapertussis PCR Not Detected     Chlamydophila pneumoniae PCR Not Detected     Mycoplasma pneumo by PCR Not Detected    Narrative:      In the setting of a positive respiratory panel with a viral infection PLUS a negative procalcitonin without other underlying concern for bacterial infection, consider observing off antibiotics or discontinuation of antibiotics and continue supportive care. If the respiratory panel is positive for atypical bacterial infection (Bordetella pertussis, Chlamydophila pneumoniae, or Mycoplasma pneumoniae), consider antibiotic de-escalation to target atypical bacterial infection.    CBC & Differential [[id number]]  (Abnormal) Collected: 08/25/23 2113    Specimen: Blood from Arm, Left Updated: 08/25/23 2126    Narrative:      The following orders were created for panel order CBC & Differential.  Procedure                               Abnormality         Status                     ---------                               -----------         ------                     CBC Auto Differential[[id number]]        Abnormal            Final result                 Please view results for these tests on the individual orders.    Comprehensive Metabolic Panel [[id number]]  (Abnormal) Collected: 08/25/23 2113    Specimen: Blood from Arm, Left Updated: 08/25/23 2144     Glucose 86 mg/dL      BUN 29 mg/dL      Creatinine 1.50 mg/dL      Sodium 136 mmol/L      Potassium 4.0 mmol/L      Chloride 97 mmol/L      CO2 24.3 mmol/L      Calcium 8.7 mg/dL      Total Protein 7.1 g/dL      Albumin 4.2 g/dL      ALT (SGPT) 42 U/L      AST (SGOT) 113 U/L      Alkaline Phosphatase 68 U/L      Total Bilirubin 0.3 mg/dL      Globulin 2.9 gm/dL      " A/G Ratio 1.4 g/dL      BUN/Creatinine Ratio 19.3     Anion Gap 14.7 mmol/L      eGFR 45.3 mL/min/1.73     Narrative:      GFR Normal >60  Chronic Kidney Disease <60  Kidney Failure <15    The GFR formula is only valid for adults with stable renal function between ages 18 and 70.    Protime-INR [[id number]]  (Normal) Collected: 08/25/23 2113    Specimen: Blood from Arm, Left Updated: 08/25/23 2139     Protime 13.9 Seconds      INR 1.05    aPTT [[id number]]  (Normal) Collected: 08/25/23 2113    Specimen: Blood from Arm, Left Updated: 08/25/23 2139     PTT 31.5 seconds     Blood Culture - Blood, Arm, Left [[id number]] Collected: 08/25/23 2113    Specimen: Blood from Arm, Left Updated: 08/25/23 2118    Lactic Acid, Plasma [[id number]]  (Normal) Collected: 08/25/23 2113    Specimen: Blood from Arm, Left Updated: 08/25/23 2139     Lactate 1.9 mmol/L     Procalcitonin [[id number]]  (Abnormal) Collected: 08/25/23 2113    Specimen: Blood from Arm, Left Updated: 08/25/23 2151     Procalcitonin 0.32 ng/mL     Narrative:      As a Marker for Sepsis (Non-Neonates):    1. <0.5 ng/mL represents a low risk of severe sepsis and/or septic shock.  2. >2 ng/mL represents a high risk of severe sepsis and/or septic shock.    As a Marker for Lower Respiratory Tract Infections that require antibiotic therapy:    PCT on Admission    Antibiotic Therapy       6-12 Hrs later    >0.5                Strongly Recommended  >0.25 - <0.5        Recommended   0.1 - 0.25          Discouraged              Remeasure/reassess PCT  <0.1                Strongly Discouraged     Remeasure/reassess PCT    As 28 day mortality risk marker: \"Change in Procalcitonin Result\" (>80% or <=80%) if Day 0 (or Day 1) and Day 4 values are available. Refer to http://www.[email or web address]-pct-calculator.com    Change in PCT <=80%  A decrease of PCT levels below or equal to 80% defines a positive change in PCT test result representing a higher risk for 28-day all-cause mortality of " patients diagnosed with severe sepsis for septic shock.    Change in PCT >80%  A decrease of PCT levels of more than 80% defines a negative change in PCT result representing a lower risk for 28-day all-cause mortality of patients diagnosed with severe sepsis or septic shock.       CBC Auto Differential [[id number]]  (Abnormal) Collected: 08/25/23 2113    Specimen: Blood from Arm, Left Updated: 08/25/23 2126     WBC 8.30 10*3/mm3      RBC 4.47 10*6/mm3      Hemoglobin 13.1 g/dL      Hematocrit 39.8 %      MCV 89.0 fL      MCH 29.3 pg      MCHC 32.9 g/dL      RDW 13.8 %      RDW-SD 44.4 fl      MPV 10.3 fL      Platelets 245 10*3/mm3      Neutrophil % 56.7 %      Lymphocyte % 31.6 %      Monocyte % 11.2 %      Eosinophil % 0.0 %      Basophil % 0.4 %      Immature Grans % 0.1 %      Neutrophils, Absolute 4.71 10*3/mm3      Lymphocytes, Absolute 2.62 10*3/mm3      Monocytes, Absolute 0.93 10*3/mm3      Eosinophils, Absolute 0.00 10*3/mm3      Basophils, Absolute 0.03 10*3/mm3      Immature Grans, Absolute 0.01 10*3/mm3      nRBC 0.0 /100 WBC     Blood Culture - Blood, Blood, Venous [[id number]] Collected: 08/25/23 2125    Specimen: Blood, Venous Updated: 08/25/23 2135    Urinalysis With Microscopic If Indicated (No Culture) - Urine, Clean Catch [[id number]]  (Abnormal) Collected: 08/26/23 0139    Specimen: Urine, Clean Catch Updated: 08/26/23 0152     Color, UA Yellow     Appearance, UA Cloudy     pH, UA <=5.0     Specific Gravity, UA 1.024     Glucose, UA Negative     Ketones, UA Trace     Bilirubin, UA Negative     Blood, UA Small (1+)     Protein, UA 30 mg/dL (1+)     Leuk Esterase, UA Negative     Nitrite, UA Negative     Urobilinogen, UA 0.2 E.U./dL    Urinalysis, Microscopic Only - Urine, Clean Catch [[id number]]  (Abnormal) Collected: 08/26/23 0139    Specimen: Urine, Clean Catch Updated: 08/26/23 0208     RBC, UA None Seen /HPF      WBC, UA 3-5 /HPF      Bacteria, UA None Seen /HPF      Squamous Epithelial Cells,  UA None Seen /HPF      Hyaline Casts, UA 0-2 /LPF      Granular Casts, UA 3-6 /LPF      Methodology Manual Light Microscopy            Imaging Results (Last 24 Hours)       Procedure Component Value Units Date/Time    XR Chest 1 View [[id number]] Collected: 08/25/23 2240     Updated: 08/25/23 2244    Narrative:      Portable chest radiograph     HISTORY: Cough, fever     TECHNIQUE: Single AP portable radiograph of the chest     COMPARISON: None       Impression:      FINDINGS AND IMPRESSION:  Bibasilar pulmonary pacification is present, left greater than right,  suggestive of atelectasis, pneumonia and/or edema in the appropriate  clinical context and correlation with patient history is recommended  with follow-up chest CT if clinically indicated. No pneumothorax is  seen. Suggestion of a large hiatal hernia. Cardiac silhouette is at the  upper limits of normal borderline enlarged.     This report was finalized on 8/25/2023 10:41 PM by Dr. Keron Briceño M.D.                   No orders to display        Assessment/Plan     Active Hospital Problems    Diagnosis  POA    **COVID-19 [U07.1]  Yes    VETO (acute kidney injury) [N17.9]  Unknown    Hyperlipidemia [E78.5]  Yes    Benign essential hypertension [I10]  Yes    Anemia [D64.9]  Yes       COVID-19  -He is not currently requiring supplemental oxygen  -Will initiate Paxlovid  -Initiate Lovenox 40 mg SC daily  -PT/OT consults  -Monitor daily COVID labs  -Patient reports he has had a decreased appetite and decreased PO intake for several months, will consult nutrition  -Elevated LFTs. Most likely viral, repeat labs in AM    VETO  -Most likely secondary to decreased PO intake  -IVF  -Repeat lab work in AM    Hypertension  -Blood pressures stable, slightly on the lower end. Patient reported he has not been taking any medications at home. Continue to hold Lisinopril and monitor for now    Hyperlipidemia  -Diet controlled    Anemia  -Hgb is stable, no signs of active  bleeding  -Repeat CBC in AM      -I discussed the patients findings and my recommendations with patient.    VTE Prophylaxis - Lovenox 40 mg SC daily.  Code Status - Full code.       NORMA Bennett  [facility]  08/26/23  02:49 EDT

## 2023-08-26 NOTE — ED NOTES
"Pt is aware of need for urine sample, states \"I don't think I can, I haven't had anything to eat or drink in a couple days\". MD aware. Will order maintenance fluids.  "

## 2023-08-26 NOTE — ED PROVIDER NOTES
EMERGENCY DEPARTMENT ENCOUNTER    Room Number:  37/37  Date seen:  2023  PCP: Ernie Panda MD  Historian(s): Patient, paramedic      HPI:  Chief Complaint: Fever, weakness, cough and congestion, no appetite  A complete HPI/ROS/PMH/PSH/SH/FH are unobtainable / limited due to: Poor independent historian  Context: Jewel Best is a 83 y.o. male who presents to the ED via EMS from home due to increasing weakness and malaise as well as cough and congestion for the past 3 days.  Patient tells me he has not had much of an appetite for the past 6 months but it has been particularly worse this week.  Patient noted to have a fever in route to the hospital.  Paramedics indicate that the patient had evidence of stool accidents at home presumably from his weakness and limited mobility.        PAST MEDICAL HISTORY  Active Ambulatory Problems     Diagnosis Date Noted    Anemia 2016    Anxiety disorder 2016    Benign essential hypertension 2016    Eczema 2016    Hyperlipidemia 2016    Fistula of skin 2016    Lymphoma in remission 2016    Body mass index (BMI) 30.0-30.9, adult  11/15/2021    Major depression, single episode 2023     Resolved Ambulatory Problems     Diagnosis Date Noted    No Resolved Ambulatory Problems     Past Medical History:   Diagnosis Date    Anxiety     Bladder cancer     Hypertension     Lymphoma          PAST SURGICAL HISTORY  Past Surgical History:   Procedure Laterality Date    KNEE SURGERY      bakers cyst removal         FAMILY HISTORY  Family History   Family history unknown: Yes         SOCIAL HISTORY  Social History     Socioeconomic History    Marital status:    Tobacco Use    Smoking status: Former     Packs/day: 1.00     Years: 10.00     Pack years: 10.00     Types: Cigarettes     Quit date: 1971     Years since quittin.7     Passive exposure: Past    Smokeless tobacco: Never   Vaping Use    Vaping Use: Never  used   Substance and Sexual Activity    Alcohol use: No    Sexual activity: Defer         ALLERGIES  Patient has no known allergies.        REVIEW OF SYSTEMS  Review of Systems   Constitutional:  Positive for activity change, appetite change and fever.   HENT:  Positive for congestion.    Eyes:  Negative for pain and visual disturbance.   Respiratory:  Positive for cough. Negative for shortness of breath.    Cardiovascular:  Negative for chest pain.   Gastrointestinal:  Negative for abdominal pain and vomiting.   Genitourinary:  Negative for dysuria.   Skin:  Negative for color change.   Neurological:  Positive for weakness. Negative for syncope and headaches.   All other systems reviewed and are negative.         PHYSICAL EXAM  ED Triage Vitals [08/25/23 2046]   Temp Heart Rate Resp BP SpO2   100.2 °F (37.9 °C) 95 18 119/60 98 %      Temp src Heart Rate Source Patient Position BP Location FiO2 (%)   Tympanic Monitor -- -- --       Physical Exam      GENERAL: Elderly gentleman, calm, no acute distress  HENT: nares patent, normocephalic and atraumatic, dry mucous membranes  EYES: no scleral icterus, EOMI, normal conjunctivae  CV: regular rhythm, normal rate, normal distal pulses  RESPIRATORY: normal effort, no stridor, occasional nonproductive cough noted, scattered rhonchi noted bilaterally  ABDOMEN: soft, nontender in all quadrant  MUSCULOSKELETAL: no deformity, no asymmetry  NEURO: alert, moves all extremities, follows commands  PSYCH:  calm, cooperative  SKIN: warm, dry    Vital signs and nursing notes reviewed.        LAB RESULTS  Recent Results (from the past 24 hour(s))   Respiratory Panel PCR w/COVID-19(SARS-CoV-2) NERY/KODY/ELIZABETH/PAD/[facility]/MAD/TUCKER In-House, NP Swab in [facility], 3-4 HR TAT - Swab, Nasopharynx    Collection Time: 08/25/23  9:08 PM    Specimen: Nasopharynx; Swab   Result Value Ref Range    ADENOVIRUS, PCR Not Detected Not Detected    Coronavirus 229E Not Detected Not Detected    Coronavirus HKU1 Not  Detected Not Detected    Coronavirus NL63 Not Detected Not Detected    Coronavirus OC43 Not Detected Not Detected    COVID19 Detected (C) Not Detected - Ref. Range    Human Metapneumovirus Not Detected Not Detected    Human Rhinovirus/Enterovirus Not Detected Not Detected    Influenza A PCR Not Detected Not Detected    Influenza B PCR Not Detected Not Detected    Parainfluenza Virus 1 Not Detected Not Detected    Parainfluenza Virus 2 Not Detected Not Detected    Parainfluenza Virus 3 Not Detected Not Detected    Parainfluenza Virus 4 Not Detected Not Detected    RSV, PCR Not Detected Not Detected    Bordetella pertussis pcr Not Detected Not Detected    Bordetella parapertussis PCR Not Detected Not Detected    Chlamydophila pneumoniae PCR Not Detected Not Detected    Mycoplasma pneumo by PCR Not Detected Not Detected   Comprehensive Metabolic Panel    Collection Time: 08/25/23  9:13 PM    Specimen: Arm, Left; Blood   Result Value Ref Range    Glucose 86 65 - 99 mg/dL    BUN 29 (H) 8 - 23 mg/dL    Creatinine 1.50 (H) 0.76 - 1.27 mg/dL    Sodium 136 136 - 145 mmol/L    Potassium 4.0 3.5 - 5.2 mmol/L    Chloride 97 (L) 98 - 107 mmol/L    CO2 24.3 22.0 - 29.0 mmol/L    Calcium 8.7 8.6 - 10.5 mg/dL    Total Protein 7.1 6.0 - 8.5 g/dL    Albumin 4.2 3.5 - 5.2 g/dL    ALT (SGPT) 42 (H) 1 - 41 U/L    AST (SGOT) 113 (H) 1 - 40 U/L    Alkaline Phosphatase 68 39 - 117 U/L    Total Bilirubin 0.3 0.0 - 1.2 mg/dL    Globulin 2.9 gm/dL    A/G Ratio 1.4 g/dL    BUN/Creatinine Ratio 19.3 7.0 - 25.0    Anion Gap 14.7 5.0 - 15.0 mmol/L    eGFR 45.3 (L) >60.0 mL/min/1.73   Protime-INR    Collection Time: 08/25/23  9:13 PM    Specimen: Arm, Left; Blood   Result Value Ref Range    Protime 13.9 11.7 - 14.2 Seconds    INR 1.05 0.90 - 1.10   aPTT    Collection Time: 08/25/23  9:13 PM    Specimen: Arm, Left; Blood   Result Value Ref Range    PTT 31.5 22.7 - 35.4 seconds   Lactic Acid, Plasma    Collection Time: 08/25/23  9:13 PM    Specimen:  Arm, Left; Blood   Result Value Ref Range    Lactate 1.9 0.5 - 2.0 mmol/L   Procalcitonin    Collection Time: 08/25/23  9:13 PM    Specimen: Arm, Left; Blood   Result Value Ref Range    Procalcitonin 0.32 (H) 0.00 - 0.25 ng/mL   CBC Auto Differential    Collection Time: 08/25/23  9:13 PM    Specimen: Arm, Left; Blood   Result Value Ref Range    WBC 8.30 3.40 - 10.80 10*3/mm3    RBC 4.47 4.14 - 5.80 10*6/mm3    Hemoglobin 13.1 13.0 - 17.7 g/dL    Hematocrit 39.8 37.5 - 51.0 %    MCV 89.0 79.0 - 97.0 fL    MCH 29.3 26.6 - 33.0 pg    MCHC 32.9 31.5 - 35.7 g/dL    RDW 13.8 12.3 - 15.4 %    RDW-SD 44.4 37.0 - 54.0 fl    MPV 10.3 6.0 - 12.0 fL    Platelets 245 140 - 450 10*3/mm3    Neutrophil % 56.7 42.7 - 76.0 %    Lymphocyte % 31.6 19.6 - 45.3 %    Monocyte % 11.2 5.0 - 12.0 %    Eosinophil % 0.0 (L) 0.3 - 6.2 %    Basophil % 0.4 0.0 - 1.5 %    Immature Grans % 0.1 0.0 - 0.5 %    Neutrophils, Absolute 4.71 1.70 - 7.00 10*3/mm3    Lymphocytes, Absolute 2.62 0.70 - 3.10 10*3/mm3    Monocytes, Absolute 0.93 (H) 0.10 - 0.90 10*3/mm3    Eosinophils, Absolute 0.00 0.00 - 0.40 10*3/mm3    Basophils, Absolute 0.03 0.00 - 0.20 10*3/mm3    Immature Grans, Absolute 0.01 0.00 - 0.05 10*3/mm3    nRBC 0.0 0.0 - 0.2 /100 WBC       Ordered the above labs and reviewed the results.        RADIOLOGY  XR Chest 1 View    Result Date: 8/25/2023  Portable chest radiograph  HISTORY: Cough, fever  TECHNIQUE: Single AP portable radiograph of the chest  COMPARISON: None      FINDINGS AND IMPRESSION: Bibasilar pulmonary pacification is present, left greater than right, suggestive of atelectasis, pneumonia and/or edema in the appropriate clinical context and correlation with patient history is recommended with follow-up chest CT if clinically indicated. No pneumothorax is seen. Suggestion of a large hiatal hernia. Cardiac silhouette is at the upper limits of normal borderline enlarged.  This report was finalized on 8/25/2023 10:41 PM by Dr. Cabrales  VILLA Briceño       Ordered the above noted radiological studies. Reviewed by me in PACS.          PROCEDURES  Procedures      MEDICATIONS GIVEN IN ER  Medications   sodium chloride 0.9 % flush 10 mL (has no administration in time range)   sodium chloride 0.9 % infusion (125 mL/hr Intravenous New Bag 8/25/23 2225)   acetaminophen (TYLENOL) tablet 1,000 mg (1,000 mg Oral Given 8/25/23 2138)           MEDICAL DECISION MAKING, PROGRESS, and CONSULTS    All labs have been independently reviewed by me.  All radiology studies have been reviewed by me and I have also reviewed the radiology report.   EKG's independently viewed and interpreted by me.  Discussion below represents my analysis of pertinent findings related to patient's condition, differential diagnosis, treatment plan and final disposition.      Additional sources:  - Discussed/ obtained information from independent historians:  I discussed with paramedics to receive report of patient's condition on arrival and treatments initiated in route to the hospital.    - External (non-ED) record review: I reviewed previous PCP progress note from July 24, 2023 when patient had assessment for major depressive disorder.  Plan was for ambulatory referral to psychiatry.    - Chronic or social conditions impacting care: Elderly patient with multiple medical problems who lives at home by himself.  Some limitations for self-care.        Orders placed during this visit:  Orders Placed This Encounter   Procedures    Respiratory Panel PCR w/COVID-19(SARS-CoV-2) NERY/KODY/ELIZABETH/PAD/COR/MAD/TUCKER In-House, NP Swab in UTM/VTM, 3-4 HR TAT - Swab, Nasopharynx    Blood Culture - Blood,    Blood Culture - Blood,    XR Chest 1 View    Comprehensive Metabolic Panel    Protime-INR    aPTT    Urinalysis With Microscopic If Indicated (No Culture) - Urine, Clean Catch    Lactic Acid, Plasma    Procalcitonin    CBC Auto Differential    Monitor Blood Pressure    Pulse Oximetry, Continuous    [facility]  (on-call MD unless specified) Details    Insert Peripheral IV    Initiate Observation Status    CBC & Differential           Differential diagnosis includes but is not limited to:    COVID-19, pneumonia, UTI, dehydration, viral illness      Independent interpretation of labs, radiology studies, and discussions with consultants:  ED Course as of 08/25/23 2309   Fri Aug 25, 2023   2209 Creatinine(!): 1.50 [HILARY]   2209 Procalcitonin(!): 0.32 [HILARY]   2243 COVID19(!!): Detected [HILARY]   2243 I independently interpreted the chest x-ray and my findings are: No pneumothorax. left basilar effusion [HILARY]   2304 Patient not able to care for himself safely at home right now.  Will request admission to [facility] for further supportive care.  I reviewed the report for chest x-ray which remarks of changes at the bilateral bases.  I think this is probably related to the Covid-19 finding and therefore I will withhold antibiotics for now. [HILARY]   2308 I just discussed with Ni from [facility] about the patient.  She agrees to accept him to the hospitalist team on behalf of of Dr. Holder [HILARY]      ED Course User Index  [HILARY] Albert Roman MD         DIAGNOSIS  Final diagnoses:   COVID-19   VETO (acute kidney injury)   Weakness         DISPOSITION  Observation to [facility], telemetry        Latest Documented Vital Signs:  As of 23:09 EDT  BP- 113/65 HR- 85 Temp- 98.7 °F (37.1 °C) (Oral) O2 sat- 94%              --    Please note that portions of this were completed with a voice recognition program.       Note Disclaimer: At [facility], we believe that sharing information builds trust and better relationships. You are receiving this note because you are receiving care at [facility] or recently visited. It is possible you will see health information before a provider has talked with you about it. This kind of information can be easy to misunderstand. To help you fully understand what it means for your health, we urge you to discuss this note with  your provider.             Albert Roman MD  08/25/23 8492

## 2023-08-26 NOTE — PLAN OF CARE
Goal Outcome Evaluation:  Plan of Care Reviewed With: patient           Outcome Evaluation: Patient is an 82 yo male admitted with VETO and COVID. He lives alone and is I with mobility at baseline. He presents with impaired balance and functional mobility. Today he performed bed mobility with SBA, STS and toilet transfer with CGA/Sup A. He was able to ambulate to and from restroom with CG/Min A then sat at EOB to eat his lunch. Acute PT indicated to address functional deficits. At this time PT recommends SNF for discharge placement and RW for mobility.      Anticipated Discharge Disposition (PT): skilled nursing facility

## 2023-08-27 LAB
ALBUMIN SERPL-MCNC: 3.9 G/DL (ref 3.5–5.2)
ALBUMIN/GLOB SERPL: 1.6 G/DL
ALP SERPL-CCNC: 57 U/L (ref 39–117)
ALT SERPL W P-5'-P-CCNC: 41 U/L (ref 1–41)
ANION GAP SERPL CALCULATED.3IONS-SCNC: 12 MMOL/L (ref 5–15)
ANION GAP SERPL CALCULATED.3IONS-SCNC: 9 MMOL/L (ref 5–15)
APTT PPP: 31.9 SECONDS (ref 22.7–35.4)
AST SERPL-CCNC: 86 U/L (ref 1–40)
BASOPHILS # BLD AUTO: 0.01 10*3/MM3 (ref 0–0.2)
BASOPHILS NFR BLD AUTO: 0.2 % (ref 0–1.5)
BILIRUB SERPL-MCNC: 0.3 MG/DL (ref 0–1.2)
BUN SERPL-MCNC: 15 MG/DL (ref 8–23)
BUN SERPL-MCNC: 16 MG/DL (ref 8–23)
BUN/CREAT SERPL: 12.9 (ref 7–25)
BUN/CREAT SERPL: 14.2 (ref 7–25)
CALCIUM SPEC-SCNC: 8.2 MG/DL (ref 8.6–10.5)
CALCIUM SPEC-SCNC: 8.4 MG/DL (ref 8.6–10.5)
CHLORIDE SERPL-SCNC: 110 MMOL/L (ref 98–107)
CHLORIDE SERPL-SCNC: 111 MMOL/L (ref 98–107)
CO2 SERPL-SCNC: 20 MMOL/L (ref 22–29)
CO2 SERPL-SCNC: 25 MMOL/L (ref 22–29)
CREAT SERPL-MCNC: 1.13 MG/DL (ref 0.76–1.27)
CREAT SERPL-MCNC: 1.16 MG/DL (ref 0.76–1.27)
CRP SERPL-MCNC: 2.63 MG/DL (ref 0–0.5)
D DIMER PPP FEU-MCNC: 1.34 MCGFEU/ML (ref 0–0.85)
DEPRECATED RDW RBC AUTO: 41.7 FL (ref 37–54)
EGFRCR SERPLBLD CKD-EPI 2021: 61.7 ML/MIN/1.73
EGFRCR SERPLBLD CKD-EPI 2021: 63.7 ML/MIN/1.73
EOSINOPHIL # BLD AUTO: 0.13 10*3/MM3 (ref 0–0.4)
EOSINOPHIL NFR BLD AUTO: 2.9 % (ref 0.3–6.2)
ERYTHROCYTE [DISTWIDTH] IN BLOOD BY AUTOMATED COUNT: 13.2 % (ref 12.3–15.4)
GLOBULIN UR ELPH-MCNC: 2.4 GM/DL
GLUCOSE SERPL-MCNC: 102 MG/DL (ref 65–99)
GLUCOSE SERPL-MCNC: 94 MG/DL (ref 65–99)
HCT VFR BLD AUTO: 35.8 % (ref 37.5–51)
HGB BLD-MCNC: 12.1 G/DL (ref 13–17.7)
HIV 1+2 AB+HIV1 P24 AG SERPL QL IA: NORMAL
IMM GRANULOCYTES # BLD AUTO: 0 10*3/MM3 (ref 0–0.05)
IMM GRANULOCYTES NFR BLD AUTO: 0 % (ref 0–0.5)
LYMPHOCYTES # BLD AUTO: 2.08 10*3/MM3 (ref 0.7–3.1)
LYMPHOCYTES NFR BLD AUTO: 46.7 % (ref 19.6–45.3)
MCH RBC QN AUTO: 29 PG (ref 26.6–33)
MCHC RBC AUTO-ENTMCNC: 33.8 G/DL (ref 31.5–35.7)
MCV RBC AUTO: 85.9 FL (ref 79–97)
MONOCYTES # BLD AUTO: 0.42 10*3/MM3 (ref 0.1–0.9)
MONOCYTES NFR BLD AUTO: 9.4 % (ref 5–12)
NEUTROPHILS NFR BLD AUTO: 1.81 10*3/MM3 (ref 1.7–7)
NEUTROPHILS NFR BLD AUTO: 40.8 % (ref 42.7–76)
NRBC BLD AUTO-RTO: 0 /100 WBC (ref 0–0.2)
PLATELET # BLD AUTO: 239 10*3/MM3 (ref 140–450)
PMV BLD AUTO: 10.1 FL (ref 6–12)
POTASSIUM SERPL-SCNC: 3.8 MMOL/L (ref 3.5–5.2)
POTASSIUM SERPL-SCNC: 4.1 MMOL/L (ref 3.5–5.2)
PROT SERPL-MCNC: 6.3 G/DL (ref 6–8.5)
RBC # BLD AUTO: 4.17 10*6/MM3 (ref 4.14–5.8)
RPR SER QL: NORMAL
SODIUM SERPL-SCNC: 143 MMOL/L (ref 136–145)
SODIUM SERPL-SCNC: 144 MMOL/L (ref 136–145)
T4 FREE SERPL-MCNC: 1.1 NG/DL (ref 0.93–1.7)
TSH SERPL DL<=0.05 MIU/L-ACNC: 7.55 UIU/ML (ref 0.27–4.2)
WBC NRBC COR # BLD: 4.45 10*3/MM3 (ref 3.4–10.8)

## 2023-08-27 PROCEDURE — 84439 ASSAY OF FREE THYROXINE: CPT | Performed by: STUDENT IN AN ORGANIZED HEALTH CARE EDUCATION/TRAINING PROGRAM

## 2023-08-27 PROCEDURE — 85730 THROMBOPLASTIN TIME PARTIAL: CPT | Performed by: NURSE PRACTITIONER

## 2023-08-27 PROCEDURE — G0432 EIA HIV-1/HIV-2 SCREEN: HCPCS | Performed by: STUDENT IN AN ORGANIZED HEALTH CARE EDUCATION/TRAINING PROGRAM

## 2023-08-27 PROCEDURE — 99223 1ST HOSP IP/OBS HIGH 75: CPT | Performed by: PSYCHIATRY & NEUROLOGY

## 2023-08-27 PROCEDURE — 84443 ASSAY THYROID STIM HORMONE: CPT | Performed by: STUDENT IN AN ORGANIZED HEALTH CARE EDUCATION/TRAINING PROGRAM

## 2023-08-27 PROCEDURE — 85379 FIBRIN DEGRADATION QUANT: CPT | Performed by: NURSE PRACTITIONER

## 2023-08-27 PROCEDURE — 86140 C-REACTIVE PROTEIN: CPT | Performed by: NURSE PRACTITIONER

## 2023-08-27 PROCEDURE — 25010000002 ENOXAPARIN PER 10 MG: Performed by: NURSE PRACTITIONER

## 2023-08-27 PROCEDURE — 80053 COMPREHEN METABOLIC PANEL: CPT | Performed by: NURSE PRACTITIONER

## 2023-08-27 PROCEDURE — 86592 SYPHILIS TEST NON-TREP QUAL: CPT | Performed by: STUDENT IN AN ORGANIZED HEALTH CARE EDUCATION/TRAINING PROGRAM

## 2023-08-27 PROCEDURE — 85025 COMPLETE CBC W/AUTO DIFF WBC: CPT | Performed by: NURSE PRACTITIONER

## 2023-08-27 RX ORDER — OLANZAPINE 10 MG/1
5 INJECTION, POWDER, LYOPHILIZED, FOR SOLUTION INTRAMUSCULAR EVERY 8 HOURS PRN
Status: DISCONTINUED | OUTPATIENT
Start: 2023-08-27 | End: 2023-08-31 | Stop reason: HOSPADM

## 2023-08-27 RX ORDER — OLANZAPINE 10 MG/1
2.5 INJECTION, POWDER, LYOPHILIZED, FOR SOLUTION INTRAMUSCULAR ONCE
Status: COMPLETED | OUTPATIENT
Start: 2023-08-27 | End: 2023-08-27

## 2023-08-27 RX ADMIN — DEXTROMETHORPHAN 60 MG: 30 SUSPENSION, EXTENDED RELEASE ORAL at 21:07

## 2023-08-27 RX ADMIN — OLANZAPINE 2.5 MG: 10 INJECTION, POWDER, LYOPHILIZED, FOR SOLUTION INTRAMUSCULAR at 00:59

## 2023-08-27 RX ADMIN — SODIUM CHLORIDE 125 ML/HR: 9 INJECTION, SOLUTION INTRAVENOUS at 02:48

## 2023-08-27 RX ADMIN — BENZONATATE 200 MG: 100 CAPSULE ORAL at 13:51

## 2023-08-27 RX ADMIN — NIRMATRELVIR AND RITONAVIR 2 EACH: KIT at 08:02

## 2023-08-27 RX ADMIN — OLANZAPINE 5 MG: 10 INJECTION, POWDER, LYOPHILIZED, FOR SOLUTION INTRAMUSCULAR at 09:12

## 2023-08-27 RX ADMIN — DEXTROMETHORPHAN 60 MG: 30 SUSPENSION, EXTENDED RELEASE ORAL at 08:02

## 2023-08-27 RX ADMIN — Medication 10 ML: at 21:08

## 2023-08-27 RX ADMIN — ENOXAPARIN SODIUM 40 MG: 100 INJECTION SUBCUTANEOUS at 08:01

## 2023-08-27 RX ADMIN — DEXTROMETHORPHAN 60 MG: 30 SUSPENSION, EXTENDED RELEASE ORAL at 02:52

## 2023-08-27 RX ADMIN — NIRMATRELVIR AND RITONAVIR 2 EACH: KIT at 21:07

## 2023-08-27 RX ADMIN — ACETAMINOPHEN 650 MG: 325 SUSPENSION ORAL at 02:54

## 2023-08-27 NOTE — NURSING NOTE
Patient is continuously getting up out of the bed/chair. Has been educated by this RN and multiple other staff members about fall risk safety measures and purpose of bed/chair alarm as patient getting extremely irritated by the alarms. Policy was even printed and provided to the patient for further clarification. Patient continues to constantly get up and walk about the room without waiting for assistance and is unsteady. Will continue with fall risk protocol and patient education.

## 2023-08-27 NOTE — NURSING NOTE
Patient confused, agitated, and verbally abusive this morning. He wants to leave and go back to his apartment. Explained that patient is unsafe to return home alone due to inability to take care of himself. Attempted to have patient talk with daughter, Meli, and patient immediately hung up the phone. Patient states he is not confused and when asked the date he answers January 3[birth year], 1993. Dr. Flores updated on increase confusion and agitation and orders for PRN zyprexa obtained. Updated patient's daughter and she is is agreeable with IM zyprexa and plans to visit patient when she gets off work tonight.

## 2023-08-27 NOTE — PLAN OF CARE
Problem: Adult Inpatient Plan of Care  Goal: Optimal Comfort and Wellbeing  Outcome: Ongoing, Progressing  Intervention: Provide Person-Centered Care  Recent Flowsheet Documentation  Taken 8/26/2023 2000 by Sachi García RN  Trust Relationship/Rapport:   care explained   thoughts/feelings acknowledged   questions answered   reassurance provided   emotional support provided     Problem: Fall Injury Risk  Goal: Absence of Fall and Fall-Related Injury  Outcome: Ongoing, Progressing   Goal Outcome Evaluation:

## 2023-08-27 NOTE — NURSING NOTE
"Called placed out to on call NP due to pt behaviors. Pt is very uncooperative, verbally combative, cursing at staff, refusing to wear heart monitor and continuously getting out of bed despite attempts from both PCA and RN to educate pt on the importance of using his call light and hospital safety. Pt is very argumentative about every aspect of care told this RN she is a \"miserable person\" and continues to refer to her as \"nurse ratchet\". Orders for IM zyprexa received, will administer and continue to monitor pt.  "

## 2023-08-27 NOTE — CONSULTS
"Neurology Consult Note    Consult Date: 8/27/2023    Referring MD: Dr. Flores    Reason for Consult I have been asked to see the patient in neurological consultation to render advice and opinion regarding     Jewel Best is a 83 y.o. male with hypertension, hyperlipidemia, bladder cancer.  He was admitted for respiratory failure associated with COVID-19.  From a respiratory standpoint he has done well and improved significantly however his hospitalization has been complicated somewhat by agitation and difficulty with cooperation..  His daughter reported that she evaluated his home and found it to be extremely filthy and covered in stool and felt that he was not cognitively safe to return home.  She has also noted significant prior sundowning symptoms in the past over a period of multiple years.    The patient himself cannot tell me why he is in the hospital or how long he has been here.  He insists that he is fine and that he is going home tomorrow.    Past Medical History:   Diagnosis Date    Anemia     Anxiety     Bladder cancer     Hyperlipidemia     Hypertension     Lymphoma        Exam  /68 (BP Location: Right arm, Patient Position: Sitting)   Pulse 86   Temp 97.1 °F (36.2 °C) (Oral)   Resp 20   Ht 179.1 cm (70.5\")   Wt 80.7 kg (178 lb)   SpO2 98%   BMI 25.18 kg/m²   Gen: NAD, vitals reviewed  MS: Oriented x2, memory impaired, impaired attention/concentration, severely impaired delayed recall, normal verbal fluency, markedly impaired insight, language intact, no neglect, agitated mood  CN: visual acuity grossly normal, PERRL, EOMI, no facial droop, no dysarthria  Motor: 5/5 throughout upper and lower extremities, normal tone, no asterixis    DATA:    Lab Results   Component Value Date    GLUCOSE 94 08/27/2023    CALCIUM 8.4 (L) 08/27/2023     08/27/2023    K 3.8 08/27/2023    CO2 25.0 08/27/2023     (H) 08/27/2023    BUN 16 08/27/2023    CREATININE 1.13 08/27/2023    EGFRIFAFRI 63 " 11/24/2021    EGFRIFNONA 54 (L) 11/24/2021    BCR 14.2 08/27/2023    ANIONGAP 9.0 08/27/2023     Lab Results   Component Value Date    WBC 4.45 08/27/2023    HGB 12.1 (L) 08/27/2023    HCT 35.8 (L) 08/27/2023    MCV 85.9 08/27/2023     08/27/2023       Lab review: CBC, BMP unremarkable.  TSH 7.5, B12 pending    Imaging review: CT head without contrast pending    Diagnoses:  Dementia, Alzheimer's type, late onset, moderate severity  COVID-19 pneumonia, uncomplicated    Comment: History, exam is overall most suggestive of moderate Alzheimer's dementia.  In particular he has difficulty with delayed recall and significantly impaired insight recent memory    PLAN:  -Agree that patient is not likely safe to live independently based on daughter's report and his current mental status exam  -Follow-up B12, CT scan of the head, likely does not need much further work-up in the hospital    Routine outpatient neurology follow-up for dementia    Discussed with Dr. Flores

## 2023-08-27 NOTE — CONSULTS
"Nutrition Services    Patient Name:  Jewel Best  YOB: 1940  MRN: [id number]  Admit Date:  8/25/2023    Assessment Date:  08/27/23    Comment: Nutrition following for chronic poor intake consult. Patient is currently in COVID isolation; he is unvaccinated. He is reported to be confused, agitated and verbally abusive today. RD deferred visit. His weight is stable per wt hx. He has eaten 25-75% x 3 meals per EMR. He reported to MD that he has had poor po intake x 1 week. Patient lives alone and needs placement per RN. RD to send boost for him to try. Will continue to follow       CLINICAL NUTRITION ASSESSMENT      Reason for Assessment Chronic Poor Intake      Diagnosis/Problem   COVID-19    Medical/Surgical History Past Medical History:   Diagnosis Date    Anemia     Anxiety     Bladder cancer     Hyperlipidemia     Hypertension     Lymphoma        Past Surgical History:   Procedure Laterality Date    KNEE SURGERY      bakers cyst removal        Encounter Information        Nutrition History:     Food Preferences:    Supplements:    Factors Affecting Intake: altered mental status, weakness     Anthropometrics        Current Height  Current Weight  BMI kg/m2 Height: 179.1 cm (70.5\")  Weight: 80.7 kg (178 lb) (08/25/23 2046)  Body mass index is 25.18 kg/m².   Adjusted BMI (if applicable)    BMI Category Overweight (25 - 29.9)       Admission Weight 178# (80.7 kg)        Ideal Body Weight (IBW) 163# (74.2 kg)    Adjusted IBW (if applicable)        Usual Body Weight (UBW)    Weight Trend Stable       Weight History Wt Readings from Last 30 Encounters:   08/25/23 2046 80.7 kg (178 lb)   07/24/23 1349 80.7 kg (178 lb)   05/26/23 1605 78.9 kg (174 lb)   05/15/23 1503 82.6 kg (182 lb)   01/09/23 1404 84.4 kg (186 lb)   10/05/22 1130 83.4 kg (183 lb 12.8 oz)   06/09/22 1026 82.8 kg (182 lb 9.6 oz)   02/17/22 1352 81.2 kg (179 lb)   12/27/21 1536 81.2 kg (179 lb)   12/08/21 1128 81.2 kg (179 lb)   11/24/21 " 1338 82.6 kg (182 lb)   11/15/21 1515 84.4 kg (186 lb)   09/17/21 1049 83.5 kg (184 lb)   09/25/20 1140 85 kg (187 lb 6.4 oz)   08/10/20 1337 87.5 kg (193 lb)   12/16/19 1426 88.5 kg (195 lb)   05/29/19 1405 82.6 kg (182 lb 3.2 oz)   03/21/19 1415 86.5 kg (190 lb 9.6 oz)   07/30/18 2023 81.6 kg (180 lb)   05/30/18 1136 85.5 kg (188 lb 9.6 oz)   12/13/17 0758 87.1 kg (192 lb)   10/04/17 1602 84.4 kg (186 lb)   09/28/16 1027 87.1 kg (192 lb)   06/29/16 1444 89.4 kg (197 lb)   04/25/16 0947 86.2 kg (190 lb)   10/28/15 0834 86.6 kg (191 lb 0.1 oz)   10/02/15 1431 83 kg (183 lb 0.1 oz)   01/05/15 1434 84.8 kg (186 lb 15.9 oz)   07/11/14 0948 82.6 kg (182 lb 3.4 oz)   01/10/14 0941 82.6 kg (182 lb 0.2 oz)      --  Estimated/Assessed Needs        Current Weight  Weight: 80.7 kg (178 lb) (08/25/23 2046)       Energy Requirements    Weight for Calculation 178# (80.7 kg)    Method for Estimation  25 kcal/kg, 30 kcal/kg   EST Needs (kcal/day)        Protein Requirements    Weight for Calculation 178# (80.7 kg)    EST Protein Needs (g/kg) 1.0 - 1.2 gm/kg   EST Daily Needs (g/day)        Fluid Requirements     Method for Estimation 1 mL/kcal    EST Needs (mL/day)      Tests/Procedures        Tests/Procedures No new tests/procedures     Labs       Pertinent Labs    Results from last 7 days   Lab Units 08/26/23  0331 08/25/23  2113   SODIUM mmol/L 139 136   POTASSIUM mmol/L 3.8 4.0   CHLORIDE mmol/L 101 97*   CO2 mmol/L 25.3 24.3   BUN mg/dL 31* 29*   CREATININE mg/dL 1.36* 1.50*   CALCIUM mg/dL 8.3* 8.7   BILIRUBIN mg/dL  --  0.3   ALK PHOS U/L  --  68   ALT (SGPT) U/L  --  42*   AST (SGOT) U/L  --  113*   GLUCOSE mg/dL 105* 86     Results from last 7 days   Lab Units 08/26/23  0331 08/25/23 2113   HEMOGLOBIN g/dL 11.5* 13.1   HEMATOCRIT % 33.4* 39.8   WBC 10*3/mm3 6.46 8.30   ALBUMIN g/dL  --  4.2     Results from last 7 days   Lab Units 08/26/23 0331 08/25/23 2113   INR   --  1.05   APTT seconds  --  31.5   PLATELETS  10*3/mm3 206 245     COVID19   Date Value Ref Range Status   08/25/2023 Detected (C) Not Detected - Ref. Range Final     No results found for: HGBA1C       Medications           Scheduled Medications dextromethorphan polistirex ER, 60 mg, Oral, Q12H  enoxaparin, 40 mg, Subcutaneous, Q24H  Nirmatrelvir&Ritonavir 150/100, 2 tablet, Oral, BID  sodium chloride, 10 mL, Intravenous, Q12H       Infusions sodium chloride, 125 mL/hr, Last Rate: 125 mL/hr (08/27/23 0248)       PRN Medications   acetaminophen **OR** acetaminophen **OR** acetaminophen    benzonatate    calcium carbonate    nitroglycerin    OLANZapine    ondansetron **OR** ondansetron    [COMPLETED] Insert Peripheral IV **AND** sodium chloride    sodium chloride    sodium chloride     Physical Findings          Physical Appearance agitated, alert, confused, disoriented, overweight   Oral/Mouth Cavity WNL   Edema  no edema   Gastrointestinal last bowel movement: 8/25   Skin  other: redness, blanchable on coccyx   Tubes/Drains/Lines none   NFPE Not indicated at this time   --  Current Nutrition Orders & Evaluation of Intake       Oral Nutrition     Food Allergies NKFA   Current PO Diet Diet: Regular/House Diet; Texture: Regular Texture (IDDSI 7); Fluid Consistency: Thin (IDDSI 0)   Supplement n/a   PO Evaluation     % PO Intake/# of Days 25-75% x 3 meals    --  PES STATEMENT / NUTRITION DIAGNOSIS      Nutrition Dx Problem  Problem: Predicted Suboptimal Intake  Etiology: Medical Diagnosis - COVID-19 and Factors Affecting Nutrition - AMS, weakness     Signs/Symptoms: PO intake and Report/Observation    Comment:    --  NUTRITION INTERVENTION / PLAN OF CARE      Intervention Goal(s) Reduce/improve symptoms, Meet estimated needs, Disease management/therapy, Tolerate PO , Increase intake, and Maintain weight         RD Intervention/Action Supplement provided, Follow Tx Progress, and Care plan reviewed         Prescription/Orders:       PO Diet       Supplements Boost q  daily       Snacks       Enteral Nutrition       Parenteral Nutrition    New Prescription Ordered? Yes   --      Monitor/Evaluation Per protocol, I&O, PO intake, Supplement intake, Pertinent labs, Weight, Skin status, GI status, Symptoms, POC/GOC   Discharge Plan/Needs No discharge needs identified at this time   Education Will instruct as appropriate   --    RD to follow per protocol.      Electronically signed by:  Amarilis Leung RD  08/27/23 11:58 EDT

## 2023-08-27 NOTE — PROGRESS NOTES
Name: Jewel Best ADMIT: 2023   : 1940  PCP: Ernie Panda MD    MRN: [id number] LOS: 1 days   AGE/SEX: 83 y.o. male  ROOM: [facility]/     Subjective   Subjective     He became very agitated overnight and required a dose of zyprexa. He was agitated again this morning and required another dose. He was calm when I saw him and his only complaint was that his mattress was uncomfortable        Objective   Objective   Vital Signs  Temp:  [97.3 °F (36.3 °C)-97.7 °F (36.5 °C)] 97.5 °F (36.4 °C)  Heart Rate:  [81-87] 87  Resp:  [16-18] 18  BP: (126-139)/(66-75) 126/66  SpO2:  [97 %-100 %] 97 %  on   ;   Device (Oxygen Therapy): room air  Body mass index is 25.18 kg/m².  Physical Exam  Constitutional:       General: He is not in acute distress.     Appearance: He is not toxic-appearing.   Cardiovascular:      Rate and Rhythm: Normal rate and regular rhythm.      Heart sounds: Normal heart sounds.   Pulmonary:      Effort: Pulmonary effort is normal. No respiratory distress.      Breath sounds: Normal breath sounds. No wheezing, rhonchi or rales.   Abdominal:      General: Bowel sounds are normal.      Palpations: Abdomen is soft.   Musculoskeletal:         General: No tenderness.      Right lower leg: No edema.      Left lower leg: No edema.   Neurological:      General: No focal deficit present.      Mental Status: He is alert. He is disoriented.   Psychiatric:         Mood and Affect: Mood normal.         Behavior: Behavior normal.       Results Review     I reviewed the patient's new clinical results.  Results from last 7 days   Lab Units 23  0331 23   WBC 10*3/mm3 6.46 8.30   HEMOGLOBIN g/dL 11.5* 13.1   PLATELETS 10*3/mm3 206 245     Results from last 7 days   Lab Units 23  0331 23   SODIUM mmol/L 139 136   POTASSIUM mmol/L 3.8 4.0   CHLORIDE mmol/L 101 97*   CO2 mmol/L 25.3 24.3   BUN mg/dL 31* 29*   CREATININE mg/dL 1.36* 1.50*   GLUCOSE mg/dL 105* 86    Estimated Creatinine Clearance: 45.3 mL/min (A) (by C-G formula based on SCr of 1.36 mg/dL (H)).  Results from last 7 days   Lab Units 08/25/23 2113   ALBUMIN g/dL 4.2   BILIRUBIN mg/dL 0.3   ALK PHOS U/L 68   AST (SGOT) U/L 113*   ALT (SGPT) U/L 42*     Results from last 7 days   Lab Units 08/26/23  0331 08/25/23 2113   CALCIUM mg/dL 8.3* 8.7   ALBUMIN g/dL  --  4.2     Results from last 7 days   Lab Units 08/25/23 2113   PROCALCITONIN ng/mL 0.32*   LACTATE mmol/L 1.9     COVID19   Date Value Ref Range Status   08/25/2023 Detected (C) Not Detected - Ref. Range Final     No results found for: HGBA1C, POCGLU    XR Chest 1 View  Narrative: Portable chest radiograph     HISTORY: Cough, fever     TECHNIQUE: Single AP portable radiograph of the chest     COMPARISON: None     Impression: FINDINGS AND IMPRESSION:  Bibasilar pulmonary pacification is present, left greater than right,  suggestive of atelectasis, pneumonia and/or edema in the appropriate  clinical context and correlation with patient history is recommended  with follow-up chest CT if clinically indicated. No pneumothorax is  seen. Suggestion of a large hiatal hernia. Cardiac silhouette is at the  upper limits of normal borderline enlarged.     This report was finalized on 8/25/2023 10:41 PM by Dr. Keron Briceño M.D.       Scheduled Medications  dextromethorphan polistirex ER, 60 mg, Oral, Q12H  enoxaparin, 40 mg, Subcutaneous, Q24H  Nirmatrelvir&Ritonavir 150/100, 2 tablet, Oral, BID  sodium chloride, 10 mL, Intravenous, Q12H    Infusions  sodium chloride, 125 mL/hr, Last Rate: 125 mL/hr (08/27/23 0248)    Diet  Diet: Regular/House Diet; Texture: Regular Texture (IDDSI 7); Fluid Consistency: Thin (IDDSI 0)       Assessment/Plan     Active Hospital Problems    Diagnosis  POA    **COVID-19 [U07.1]  Yes    VETO (acute kidney injury) [N17.9]  Unknown    Hyperlipidemia [E78.5]  Yes    Benign essential hypertension [I10]  Yes    Anemia [D64.9]  Yes       Resolved Hospital Problems   No resolved problems to display.       83 y.o. male admitted with COVID-19.    COVID-19 without hypoxemia-there is evidence of pneumonia on his chest x-ray (although on my review, it is not impressive) and his procalcitonin is elevated, but he isn't coughing or febrile, and there is no leukocytosis. Ok to monitor without abx for now as both the covid and his renal dysfunction could be the cause of the elevated procalcitonin. Continue paxlovid  VETO-improved somewhat with IVF. Labs for today are pending.   Waxing and waning confusion and agitation-suspect underlying dementia. Discussed with his daughter at length by telephone and he's had intermittent agitation and sundowning likely symptoms for years. He is noncompliant with medications as an outpatient. She reports that when she found him at home this time, his house was filthy and covered in stool and she doesn't feel that he is safe to return home. She requests a neurology evaluation, and I will ask them to see the patient. I will also check b12, tsh, rpr, hiv, and a head CT.  Hypertension-doesn't take his medications at home, but bp is fine here  Hyperlipidemia-he doesn't take any medications for this at home.  Depression-doesn't take his medications  History of lymphoma in remission  Lovenox 40 mg SC daily for DVT prophylaxis.  Full code.  Discussed with patient, family, and nursing staff.  Anticipate discharge to SNU facility timing yet to be determined.   Ok to transfer to med/surg      Jean Flores MD  [facility]  08/27/23  12:24 EDT    I wore protective equipment throughout this patient encounter including a face mask, gloves and protective eyewear.  Hand hygiene was performed before donning protective equipment and after removal when leaving the room.

## 2023-08-28 ENCOUNTER — APPOINTMENT (OUTPATIENT)
Dept: CT IMAGING | Facility: HOSPITAL | Age: 83
DRG: 177 | End: 2023-08-28
Payer: MEDICARE

## 2023-08-28 LAB
ANION GAP SERPL CALCULATED.3IONS-SCNC: 9.6 MMOL/L (ref 5–15)
BUN SERPL-MCNC: 16 MG/DL (ref 8–23)
BUN/CREAT SERPL: 14.5 (ref 7–25)
CALCIUM SPEC-SCNC: 8.1 MG/DL (ref 8.6–10.5)
CHLORIDE SERPL-SCNC: 110 MMOL/L (ref 98–107)
CO2 SERPL-SCNC: 24.4 MMOL/L (ref 22–29)
CREAT SERPL-MCNC: 1.1 MG/DL (ref 0.76–1.27)
DEPRECATED RDW RBC AUTO: 41.4 FL (ref 37–54)
EGFRCR SERPLBLD CKD-EPI 2021: 65.8 ML/MIN/1.73
ERYTHROCYTE [DISTWIDTH] IN BLOOD BY AUTOMATED COUNT: 13.2 % (ref 12.3–15.4)
GLUCOSE SERPL-MCNC: 79 MG/DL (ref 65–99)
HCT VFR BLD AUTO: 32.3 % (ref 37.5–51)
HGB BLD-MCNC: 11.1 G/DL (ref 13–17.7)
MCH RBC QN AUTO: 29.3 PG (ref 26.6–33)
MCHC RBC AUTO-ENTMCNC: 34.4 G/DL (ref 31.5–35.7)
MCV RBC AUTO: 85.2 FL (ref 79–97)
PLATELET # BLD AUTO: 220 10*3/MM3 (ref 140–450)
PMV BLD AUTO: 10 FL (ref 6–12)
POTASSIUM SERPL-SCNC: 3.9 MMOL/L (ref 3.5–5.2)
RBC # BLD AUTO: 3.79 10*6/MM3 (ref 4.14–5.8)
SODIUM SERPL-SCNC: 144 MMOL/L (ref 136–145)
VIT B12 BLD-MCNC: 638 PG/ML (ref 211–946)
WBC NRBC COR # BLD: 4.42 10*3/MM3 (ref 3.4–10.8)

## 2023-08-28 PROCEDURE — 97166 OT EVAL MOD COMPLEX 45 MIN: CPT

## 2023-08-28 PROCEDURE — 25010000002 ENOXAPARIN PER 10 MG: Performed by: NURSE PRACTITIONER

## 2023-08-28 PROCEDURE — 99232 SBSQ HOSP IP/OBS MODERATE 35: CPT

## 2023-08-28 PROCEDURE — 82607 VITAMIN B-12: CPT | Performed by: STUDENT IN AN ORGANIZED HEALTH CARE EDUCATION/TRAINING PROGRAM

## 2023-08-28 PROCEDURE — 80048 BASIC METABOLIC PNL TOTAL CA: CPT | Performed by: STUDENT IN AN ORGANIZED HEALTH CARE EDUCATION/TRAINING PROGRAM

## 2023-08-28 PROCEDURE — 85027 COMPLETE CBC AUTOMATED: CPT | Performed by: STUDENT IN AN ORGANIZED HEALTH CARE EDUCATION/TRAINING PROGRAM

## 2023-08-28 PROCEDURE — 70450 CT HEAD/BRAIN W/O DYE: CPT

## 2023-08-28 PROCEDURE — 97110 THERAPEUTIC EXERCISES: CPT

## 2023-08-28 RX ADMIN — NIRMATRELVIR AND RITONAVIR 2 EACH: KIT at 09:22

## 2023-08-28 RX ADMIN — BENZONATATE 200 MG: 100 CAPSULE ORAL at 06:51

## 2023-08-28 RX ADMIN — Medication 10 ML: at 09:20

## 2023-08-28 RX ADMIN — DEXTROMETHORPHAN 60 MG: 30 SUSPENSION, EXTENDED RELEASE ORAL at 22:26

## 2023-08-28 RX ADMIN — NIRMATRELVIR AND RITONAVIR 2 EACH: KIT at 20:28

## 2023-08-28 RX ADMIN — BENZONATATE 200 MG: 100 CAPSULE ORAL at 20:27

## 2023-08-28 RX ADMIN — DEXTROMETHORPHAN 60 MG: 30 SUSPENSION, EXTENDED RELEASE ORAL at 09:20

## 2023-08-28 RX ADMIN — ENOXAPARIN SODIUM 40 MG: 100 INJECTION SUBCUTANEOUS at 09:20

## 2023-08-28 NOTE — PROGRESS NOTES
"DOS: 2023  NAME: Jewel Best   : 1940  PCP: Ernie Panda MD  Chief Complaint   Patient presents with    Weakness - Generalized    Flu Symptoms     Stroke    Subjective:     Interval History  Pt seen in follow up today, however the problem is new to the examiner.  Patient Complaints:  No acute events overnight.  Patient is resting in bed.  He states that he feels fine.  No family at bedside.  History taken from: patient chart    Objective:  Vital signs: /67 (BP Location: Right arm, Patient Position: Lying)   Pulse 76   Temp 97 °F (36.1 °C) (Oral)   Resp 20   Ht 179.1 cm (70.5\")   Wt 80.7 kg (178 lb)   SpO2 96%   BMI 25.18 kg/m²       Physical Exam:  GENERAL: NAD, drowsy and cooperative  HEENT: Normocephalic, atraumatic   Resp: Even and unlabored, no audible wheezes  Extremities: No signs of distal embolization. Normal joint ROM  Skin: Warm and dry, no rashes or birth marks.   Psychiatric: Normal mood and affect.    Neurological:   MS: Oriented to self, place, year. Recent/remote memory impaired, normal attention/concentration, language intact, no neglect  CN: Blinks to threat, PERRL, EOMI, no nystagmus, no facial droop, no dysarthria, hearing symmetric, tongue midline  Motor: 5/5 strength in all 4 ext. Normal tone.  No tremor noted  Sensory: Intact to light touch in all four ext.  Gait and station: Deferred    Results Review:    I reviewed the patient's new clinical results.  I reviewed the patient's new imaging results and agree with the interpretation.  I reviewed the patient's other test results and agree with the interpretation  Discussed with Dr. Adams    Current Medications:  Scheduled Medications:dextromethorphan polistirex ER, 60 mg, Oral, Q12H  enoxaparin, 40 mg, Subcutaneous, Q24H  Nirmatrelvir&Ritonavir 150/100, 2 tablet, Oral, BID  sodium chloride, 10 mL, Intravenous, Q12H      Infusions:    PRN Medications:    acetaminophen **OR** acetaminophen **OR** " acetaminophen    benzonatate    calcium carbonate    nitroglycerin    OLANZapine    ondansetron **OR** ondansetron    [COMPLETED] Insert Peripheral IV **AND** sodium chloride    sodium chloride    sodium chloride    Laboratory results:  Lab Results   Component Value Date    GLUCOSE 79 08/28/2023    CALCIUM 8.1 (L) 08/28/2023     08/28/2023    K 3.9 08/28/2023    CO2 24.4 08/28/2023     (H) 08/28/2023    BUN 16 08/28/2023    CREATININE 1.10 08/28/2023    EGFRIFAFRI 63 11/24/2021    EGFRIFNONA 54 (L) 11/24/2021    BCR 14.5 08/28/2023    ANIONGAP 9.6 08/28/2023     Lab Results   Component Value Date    WBC 4.42 08/28/2023    HGB 11.1 (L) 08/28/2023    HCT 32.3 (L) 08/28/2023    MCV 85.2 08/28/2023     08/28/2023          Lab Results   Component Value Date    INR 1.05 08/25/2023    INR 1.1 05/06/2023    PROTIME 13.9 08/25/2023    PROTIME 11.5 05/06/2023     Lab Results   Component Value Date    TSH 7.550 (H) 08/27/2023     No components found for: LDLCALC  No results found for: HGBA1C  No components found for: B12    Review and interpretation of imaging:   XR Chest 1 View    Result Date: 8/25/2023  Portable chest radiograph  HISTORY: Cough, fever  TECHNIQUE: Single AP portable radiograph of the chest  COMPARISON: None      FINDINGS AND IMPRESSION: Bibasilar pulmonary pacification is present, left greater than right, suggestive of atelectasis, pneumonia and/or edema in the appropriate clinical context and correlation with patient history is recommended with follow-up chest CT if clinically indicated. No pneumothorax is seen. Suggestion of a large hiatal hernia. Cardiac silhouette is at the upper limits of normal borderline enlarged.  This report was finalized on 8/25/2023 10:41 PM by Dr. Keron Briceño M.D.       Work-up to date:  [facility] wo: Results pending  CXR: Bibasilar pulmonary opacification is present, left greater than right.  Suggestive of atelectasis, pneumonia, and/or edema.  No pneumothorax  seen.    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 Best is an 83-year-old male with a past medical history of HTN, HLD, bladder cancer.  He originally presented to [facility]/25/23 and was found to be positive for COVID-19.  Neurology was consulted for agitation and poor cooperation.  He has no focal or unilateral signs, major visual disturbances. CT head wo was performed.  There is some concern voiced by his daughter about sundowning over the past several years as well as current poor living conditions at his home. He will benefit from Neuro work-up for dementia as an outpatient. He is neurologically stable. Preliminary CT head read is non-acute. If CT head is negative we will sign off and see again upon request.  Call RRT for any acute neurologic change or concern.    Plan:  Await CT head official report - we will sign off if normal    Correct metabolic causes and mentation should improve  Delirium precautions  Therapies as written  CCP for discharge planning - it is not recommended the patient return home at this time    Follow-up with Neurology outpatient for dementia work-up - this will be arranged for the patient    I have discussed the above with the patient and Dr. Adams who are in agreement with the plan.     Evidence-based delirium precautions include:     1. Frequent reorientation, reminding patient not questioning patient   2. Shades up during day/down at night   3. TV off except for soft music only   4. Familiar objects at bedside   5. Encourage friends/family to spend the night   6. Minimize anticholingeric medications   7. Minimize polypharmacy   8. Minimize restraints, includes lines and/or foleys and/or feeding tubes as able    9. Minimize overnight checks/vitals to encourage restful consistent sleep patterns   10. Out of bed during day as much as possible     NORMA Chirinos  08/28/23  12:48 EDT    Diagnoses:    COVID-19    Anemia    Benign essential hypertension    Hyperlipidemia    VETO (acute kidney injury)

## 2023-08-28 NOTE — THERAPY TREATMENT NOTE
Patient Name: Jewel Best  : 1940    MRN: [id number]                              Today's Date: 2023       Admit Date: 2023    Visit Dx:     ICD-10-CM ICD-9-CM   1. COVID-19  U07.1 079.89   2. VETO (acute kidney injury)  N17.9 584.9   3. Weakness  R53.1 780.79     Patient Active Problem List   Diagnosis    Anemia    Anxiety disorder    Benign essential hypertension    Eczema    Hyperlipidemia    Fistula of skin    Lymphoma in remission    Body mass index (BMI) 30.0-30.9, adult     Major depression, single episode    COVID-19    VETO (acute kidney injury)     Past Medical History:   Diagnosis Date    Anemia     Anxiety     Bladder cancer     Hyperlipidemia     Hypertension     Lymphoma      Past Surgical History:   Procedure Laterality Date    KNEE SURGERY      bakers cyst removal      General Information       Row Name 23 1310          Physical Therapy Time and Intention    Document Type therapy note (daily note)  -PC     Mode of Treatment physical therapy  -PC       Row Name 23 1310          General Information    Existing Precautions/Restrictions fall  -PC       Row Name 23 1310          Cognition    Orientation Status (Cognition) oriented x 3  -PC               User Key  (r) = Recorded By, (t) = Taken By, (c) = Cosigned By      Initials Name Provider Type    PC Racheal Wheeler PT Physical Therapist                   Mobility       Row Name 23 1310          Bed Mobility    Supine-Sit [place] (Bed Mobility) standby assist  -PC       Row Name 23 1310          Sit-Stand Transfer    Sit-Stand [place] (Transfers) contact guard  -PC       Row Name 23 1310          Gait/Stairs (Locomotion)    Keystone Level (Gait) contact guard  -PC     Assistive Device (Gait) walker, front-wheeled  -PC     Distance in Feet (Gait) 40 ft, initially started out without assistive device, pt unsteady and holding to furniture, used rolling walker with improved steadiness,  pt needed cues for safe use of walker tended to have it too far out in front of him with forward flexed posture, knees slightly flexed  -PC               User Key  (r) = Recorded By, (t) = Taken By, (c) = Cosigned By      Initials Name Provider Type    PC Racehal Wheeler, PT Physical Therapist                   Obj/Interventions    No documentation.                  Goals/Plan    No documentation.                  Clinical Impression       Row Name 08/28/23 1312          Pain    Pretreatment Pain Rating 0/10 - no pain  -PC       Row Name 08/28/23 1312          Plan of Care Review    Plan of Care Reviewed With patient  -PC     Progress improving  -PC     Outcome Evaluation Pt demonstrates unsteady gait, improved with using a rolling walker, able to walk 40 ft with rwx with CGA and a few standing rests, at current level he is a fall risk and not safe to return home alone  -PC       Row Name 08/28/23 1312          Therapy Assessment/Plan (PT)    Therapy Frequency (PT) 3 times/wk  -PC       Row Name 08/28/23 1312          Positioning and Restraints    Pre-Treatment Position in bed  -PC     Post Treatment Position chair  -PC     In Chair reclined;call light within reach;encouraged to call for assist;exit alarm on;notified nsg  sitter watching from francis window  -PC               User Key  (r) = Recorded By, (t) = Taken By, (c) = Cosigned By      Initials Name Provider Type    PC Racheal Wheeler, PT Physical Therapist                   Outcome Measures       Row Name 08/28/23 1314          How much help from another person do you currently need...    Turning from your back to your side while in flat bed without using bedrails? 4  -PC     Moving from lying on back to sitting on the side of a flat bed without bedrails? 4  -PC     Moving to and from a bed to a chair (including a wheelchair)? 3  -PC     Standing up from a chair using your arms (e.g., wheelchair, bedside chair)? 3  -PC     Climbing 3-5 steps with a railing? 3   -PC     To walk in hospital room? 3  -PC     AM-PAC 6 Clicks Score (PT) 20  -PC     Highest level of mobility 6 --> Walked 10 steps or more  -PC               User Key  (r) = Recorded By, (t) = Taken By, (c) = Cosigned By      Initials Name Provider Type    PC Racheal Wheeler, PT Physical Therapist                                 Physical Therapy Education       Title: PT OT SLP Therapies (In Progress)       Topic: Physical Therapy (In Progress)       Point: Mobility training (Done)       Learning Progress Summary             Patient Acceptance, E,D, DU by PC at 8/28/2023 1315    Acceptance, E, VU,NR by  at 8/26/2023 1338                         Point: Home exercise program (Not Started)       Learner Progress:  Not documented in this visit.              Point: Body mechanics (Done)       Learning Progress Summary             Patient Acceptance, E,D, DU by PC at 8/28/2023 1315                         Point: Precautions (Done)       Learning Progress Summary             Patient Acceptance, E,D, DU by  at 8/28/2023 1315    Acceptance, E, VU,NR by MW at 8/26/2023 1338                                         User Key       Initials Effective Dates Name Provider Type Discipline     06/16/21 -  Racheal Wheeler, PT Physical Therapist PT     06/16/21 -  Brynn Hernandez PT Physical Therapist PT                  PT Recommendation and Plan     Plan of Care Reviewed With: patient  Progress: improving  Outcome Evaluation: Pt demonstrates unsteady gait, improved with using a rolling walker, able to walk 40 ft with rwx with CGA and a few standing rests, at current level he is a fall risk and not safe to return home alone     Time Calculation:         PT Charges       Row Name 08/28/23 1315             Time Calculation    Start Time 1059  -PC      Stop Time 1125  -PC      Time Calculation (min) 26 min  -PC      PT Received On 08/28/23  -PC      PT - Next Appointment 08/30/23  -PC                User Key  (r) = Recorded By,  (t) = Taken By, (c) = Cosigned By      Initials Name Provider Type    PC Racheal Wheeler, PT Physical Therapist                  Therapy Charges for Today       Code Description Service Date Service Provider Modifiers Qty    22578903722 HC PT THER PROC EA 15 MIN 8/28/2023 Racheal Wheeler PT GP 2            PT G-Codes  Outcome Measure Options: AM-PAC 6 Clicks Basic Mobility (PT)  AM-PAC 6 Clicks Score (PT): 20       Racheal Wheeler PT  8/28/2023

## 2023-08-28 NOTE — PROGRESS NOTES
Name: Jewel Best ADMIT: 2023   : 1940  PCP: Ernie Panda MD    MRN: [id number] LOS: 2 days   AGE/SEX: 83 y.o. male  ROOM: [facility]     Subjective   Subjective     Sleeping when I saw him. There have been reports of intermittent agitation, but he didn't require any prns overnight.        Objective   Objective   Vital Signs  Temp:  [96.9 °F (36.1 °C)-98.4 °F (36.9 °C)] 97 °F (36.1 °C)  Heart Rate:  [76-94] 76  Resp:  [20] 20  BP: (103-134)/(64-80) 113/67  SpO2:  [96 %-100 %] 96 %  on   ;   Device (Oxygen Therapy): room air  Body mass index is 25.18 kg/m².  Physical Exam  Constitutional:       General: He is not in acute distress.     Appearance: He is not toxic-appearing.   Cardiovascular:      Rate and Rhythm: Normal rate and regular rhythm.      Heart sounds: Normal heart sounds.   Pulmonary:      Effort: Pulmonary effort is normal. No respiratory distress.      Breath sounds: Normal breath sounds. No wheezing, rhonchi or rales.   Abdominal:      General: Bowel sounds are normal.      Palpations: Abdomen is soft.   Musculoskeletal:         General: No tenderness.      Right lower leg: No edema.      Left lower leg: No edema.   Neurological:      General: No focal deficit present.      Mental Status: He is disoriented.   Psychiatric:         Mood and Affect: Mood normal.         Behavior: Behavior normal.       Results Review     I reviewed the patient's new clinical results.  Results from last 7 days   Lab Units 23  0445 23  1241 23  0331 23  2113   WBC 10*3/mm3 4.42 4.45 6.46 8.30   HEMOGLOBIN g/dL 11.1* 12.1* 11.5* 13.1   PLATELETS 10*3/mm3 220 239 206 245       Results from last 7 days   Lab Units 23  0445 23  1406 23  1241 23  0331   SODIUM mmol/L 144 143 144 139   POTASSIUM mmol/L 3.9 4.1 3.8 3.8   CHLORIDE mmol/L 110* 111* 110* 101   CO2 mmol/L 24.4 20.0* 25.0 25.3   BUN mg/dL 16 15 16 31*   CREATININE mg/dL 1.10 1.16 1.13 1.36*    GLUCOSE mg/dL 79 102* 94 105*     Estimated Creatinine Clearance: 56 mL/min (by C-G formula based on SCr of 1.1 mg/dL).  Results from last 7 days   Lab Units 08/27/23  1241 08/25/23 2113   ALBUMIN g/dL 3.9 4.2   BILIRUBIN mg/dL 0.3 0.3   ALK PHOS U/L 57 68   AST (SGOT) U/L 86* 113*   ALT (SGPT) U/L 41 42*       Results from last 7 days   Lab Units 08/28/23  0445 08/27/23  1406 08/27/23  1241 08/26/23  0331 08/25/23 2113   CALCIUM mg/dL 8.1* 8.2* 8.4* 8.3* 8.7   ALBUMIN g/dL  --   --  3.9  --  4.2       Results from last 7 days   Lab Units 08/25/23 2113   PROCALCITONIN ng/mL 0.32*   LACTATE mmol/L 1.9       COVID19   Date Value Ref Range Status   08/25/2023 Detected (C) Not Detected - Ref. Range Final     No results found for: HGBA1C, POCGLU    CT Head Without Contrast  Narrative: CT SCAN OF THE HEAD WITHOUT CONTRAST 08/28/2023     CLINICAL HISTORY: Patient demonstrated some sundowning with intermittent  agitation, is COVID-positive.      TECHNIQUE: Spiral CT images were obtained from the base of the skull to  the vertex without intravenous contrast. The images were reformatted and  submitted in 3 mm thick axial, sagittal and coronal CT sections with  brain algorithm.      There are no prior studies from [facility] for  comparison.     FINDINGS: There is some patchy low density extending from the  periventricular into the subcortical white matter of the cerebral  hemispheres consistent with mild-to-moderate small vessel disease. The  remainder of the brain parenchyma is normal in attenuation. There is  diffuse cerebral atrophy.  The lateral and third ventricles are  prominent in size, felt to be due to central volume loss or atrophy. I  see no focal mass effect and no midline shift. No extra-axial fluid  collections are identified. There is no evidence of acute intracranial  hemorrhage. There is minimal bilateral anterior ethmoid sinus mucosal  thickening. The remainder of the paranasal sinuses  and the mastoid air  cells and the middle ear cavity are clear. The calvarium and skull base  are normal in appearance. There are bilateral intraocular lens implants  in the globes from previous cataract surgery, otherwise the orbits are  unremarkable.     Impression: 1. No acute intracranial abnormality is identified.   2. There is mild-to-moderate small vessel disease in the cerebral white  matter and there is diffuse cerebral atrophy. Bilateral intraocular lens  implants are present from previous cataract surgery. The remainder of  the head CT is within normal limits.     Radiation dose reduction techniques were utilized, including automated  exposure control and exposure modulation based on body size.          Scheduled Medications  dextromethorphan polistirex ER, 60 mg, Oral, Q12H  enoxaparin, 40 mg, Subcutaneous, Q24H  Nirmatrelvir&Ritonavir 150/100, 2 tablet, Oral, BID  sodium chloride, 10 mL, Intravenous, Q12H    Infusions     Diet  Diet: Regular/House Diet; Texture: Regular Texture (IDDSI 7); Fluid Consistency: Thin (IDDSI 0)       Assessment/Plan     Active Hospital Problems    Diagnosis  POA    **COVID-19 [U07.1]  Yes    VETO (acute kidney injury) [N17.9]  Unknown    Hyperlipidemia [E78.5]  Yes    Benign essential hypertension [I10]  Yes    Anemia [D64.9]  Yes      Resolved Hospital Problems   No resolved problems to display.       83 y.o. male admitted with COVID-19.    COVID-19 without hypoxemia-finish a 5 day course of paxlovid  VETO-resolved with IVF  Waxing and waning confusion and agitation-likely underlying dementia. Discussed with Dr. Lange yesterday. Tsh was slightly elevated, but free t4 was normal. B12 was normal. Rpr and hiv were normal. Preliminary head CT doesn't show any acute findings. Neurology is planning an outpatient dementia workup.  Subclinical hypothyroidism-no indication for treatment. Would just recheck a tsh as an outpatient in a couple of months  Hypertension-doesn't take his  medications at home, but bp is fine here  Hyperlipidemia-he doesn't take any medications for this at home.  Depression-doesn't take his medications  History of lymphoma in remission  Lovenox 40 mg SC daily for DVT prophylaxis.  Full code.  Discussed with patient and family.  Anticipate discharge to SNU facility once arrangements have been made.       Jean Flores MD  [facility]  08/28/23  14:45 EDT    I wore protective equipment throughout this patient encounter including a face mask, gloves and protective eyewear.  Hand hygiene was performed before donning protective equipment and after removal when leaving the room.

## 2023-08-28 NOTE — THERAPY EVALUATION
"Patient Name: Jewel Best  : 1940    MRN: [id number]                              Today's Date: 2023       Admit Date: 2023    Visit Dx:     ICD-10-CM ICD-9-CM   1. COVID-19  U07.1 079.89   2. VETO (acute kidney injury)  N17.9 584.9   3. Weakness  R53.1 780.79     Patient Active Problem List   Diagnosis    Anemia    Anxiety disorder    Benign essential hypertension    Eczema    Hyperlipidemia    Fistula of skin    Lymphoma in remission    Body mass index (BMI) 30.0-30.9, adult     Major depression, single episode    COVID-19    VETO (acute kidney injury)     Past Medical History:   Diagnosis Date    Anemia     Anxiety     Bladder cancer     Hyperlipidemia     Hypertension     Lymphoma      Past Surgical History:   Procedure Laterality Date    KNEE SURGERY      bakers cyst removal      General Information       Row Name 23 1410          OT Time and Intention    Document Type evaluation  -CE     Mode of Treatment occupational therapy;individual therapy  -CE       Row Name 23 1410          General Information    Patient Profile Reviewed yes  -CE     Prior Level of Function independent:;ADL's;transfer;gait  although per chart pts living environment not safe and pt unable to care for self  -CE     Existing Precautions/Restrictions fall  -CE     Barriers to Rehab previous functional deficit  -CE       Row Name 23 1410          Living Environment    People in Home alone  -CE       Row Name 23 1410          Stairs Within Home, Primary    Stairs Comment, Within Home, Primary pt not an accurate historian and unable to provide home setup at time of eval  -CE       Row Name 23 1410          Cognition    Orientation Status (Cognition) oriented to;person;place  \"hospital\" grossly to date stating \"its [place]\"  -CE       Row Name 23 1410          Safety Issues, Functional Mobility    Safety Issues Affecting Function (Mobility) insight into " deficits/self-awareness;judgment;positioning of assistive device;problem-solving;safety precaution awareness;safety precautions follow-through/compliance;sequencing abilities  -CE     Impairments Affecting Function (Mobility) balance;cognition;endurance/activity tolerance;strength  -CE     Cognitive Impairments, Mobility Safety/Performance awareness, need for assistance;insight into deficits/self-awareness;problem-solving/reasoning;safety precaution awareness;safety precaution follow-through;sequencing abilities  -CE               User Key  (r) = Recorded By, (t) = Taken By, (c) = Cosigned By      Initials Name Provider Type    CE Juanita Palacios OT Occupational Therapist                     Mobility/ADL's       Row Name 08/28/23 1412          Bed Mobility    Comment, (Bed Mobility) pt up in chair prior to session  -CE       Row Name 08/28/23 1412          Transfers    Transfers sit-stand transfer;toilet transfer;stand-sit transfer  -CE       Row Name 08/28/23 1412          Sit-Stand Transfer    Sit-Stand [place] (Transfers) contact guard  -     Assistive Device (Sit-Stand Transfers) walker, front-wheeled  -CE       Row Name 08/28/23 1412          Stand-Sit Transfer    Stand-Sit [place] (Transfers) verbal cues;contact guard;1 person assist  -CE     Assistive Device (Stand-Sit Transfers) walker, front-wheeled  -CE       Row Name 08/28/23 1412          Toilet Transfer    Type (Toilet Transfer) sit-stand;stand-sit  -CE     [place] Level (Toilet Transfer) contact guard;1 person assist  -CE     Assistive Device (Toilet Transfer) walker, front-wheeled  -CE     Comment, (Toilet Transfer) cues for placement and mgt of AD  -CE       Row Name 08/28/23 1412          Functional Mobility    Functional Mobility- Comment Pt able to ambulate to bathroom and back to bed with CGA x 1 using FWW. Required cues for keeping FWW closer to body during ambulation.  -CE       Row Name 08/28/23 1412          Activities of  Daily Living    BADL Assessment/Intervention toileting;grooming  -CE       Row Name 08/28/23 1412          Toileting Assessment/Training    [place] Level (Toileting) adjust/manage clothing;moderate assist (50% patient effort);maximum assist (25% patient effort)  -CE     Position (Toileting) supported standing  -CE       Row Name 08/28/23 1412          Grooming Assessment/Training    [place] Level (Grooming) oral care regimen;contact guard assist  -CE     Position (Grooming) sink side;supported standing  -CE               User Key  (r) = Recorded By, (t) = Taken By, (c) = Cosigned By      Initials Name Provider Type    CE Juanita Palacios OT Occupational Therapist                   Obj/Interventions       Row Name 08/28/23 1414          Sensory Assessment (Somatosensory)    Sensory Assessment (Somatosensory) sensation intact  -CE       Row Name 08/28/23 1414          Vision Assessment/Intervention    Visual Impairment/Limitations WFL  -CE       Row Name 08/28/23 1414          Range of Motion Comprehensive    General Range of Motion no range of motion deficits identified  -CE       Row Name 08/28/23 1414          Strength Comprehensive (MMT)    Comment, General Manual Muscle Testing (MMT) Assessment BUE grossly 4/5 and BLE >/= 3/5 for functional activity  -CE       Row Name 08/28/23 1414          Motor Skills    Motor Skills functional endurance  -CE     Functional Endurance fair  -CE       Row Name 08/28/23 1414          Balance    Dynamic Standing Balance 1-person assist;verbal cues;contact guard  -CE               User Key  (r) = Recorded By, (t) = Taken By, (c) = Cosigned By      Initials Name Provider Type    CE Juanita Palacios OT Occupational Therapist                   Goals/Plan       Row Name 08/28/23 1417          Dressing Goal 1 (OT)    Activity/Device (Dressing Goal 1, OT) dressing skills, all  -CE     [place]/Cues Needed (Dressing Goal 1, OT) supervision required  -CE     Time Frame  (Dressing Goal 1, OT) short term goal (STG);2 weeks  -CE       Row Name 08/28/23 1417          Toileting Goal 1 (OT)    Activity/Device (Toileting Goal 1, OT) toileting skills, all  -CE     [place] Level/Cues Needed (Toileting Goal 1, OT) supervision required  -CE     Time Frame (Toileting Goal 1, OT) short term goal (STG);2 weeks  -CE       Row Name 08/28/23 1417          Strength Goal 1 (OT)    Strength Goal 1 (OT) Pt will complete standing level I/ADL task x 10 min without seated rest using AD as needed and with supervision for safety.  -CE     Time Frame (Strength Goal 1, OT) short term goal (STG);2 weeks  -CE       Row Name 08/28/23 1417          Therapy Assessment/Plan (OT)    Planned Therapy Interventions (OT) activity tolerance training;patient/caregiver education/training;adaptive equipment training;BADL retraining;occupation/activity based interventions;strengthening exercise;transfer/mobility retraining;functional balance retraining  -CE               User Key  (r) = Recorded By, (t) = Taken By, (c) = Cosigned By      Initials Name Provider Type    CE Juanita Palacios OT Occupational Therapist                   Clinical Impression       Row Name 08/28/23 1416          Pain Assessment    Pretreatment Pain Rating 0/10 - no pain  -CE     Posttreatment Pain Rating 0/10 - no pain  -CE       Row Name 08/28/23 1413          Plan of Care Review    Outcome Evaluation Pt seen for OT eval after admission with COVID and VETO. Per chart pt was living alone, however, family with safety concerns and home not safe for pt to return to alone. Pt was able to follow 1-step commands for basic, familiar tasks but required max cues and education on role of OT. Pt able to mobilize with CGA x 1 using FWW with cues for safe mgt of AD. Pt does have flexed posture when standing and required cues to stand tall. Pt able to complete grooming standing at sink <5 min with CGA using FWW. OT will con't to follow for stated goals and  recommend dc/ to SNF.  -CE       Row Name 08/28/23 1414          Therapy Assessment/Plan (OT)    Rehab Potential (OT) good, to achieve stated therapy goals  -CE     Criteria for Skilled Therapeutic Interventions Met (OT) yes  -CE     Therapy Frequency (OT) 3 times/wk  -CE       Row Name 08/28/23 1414          Therapy Plan Review/Discharge Plan (OT)    Anticipated Discharge Disposition (OT) skilled nursing facility  -CE       Row Name 08/28/23 1414          Vital Signs    O2 Delivery Pre Treatment room air  -CE     O2 Delivery Intra Treatment room air  -CE     O2 Delivery Post Treatment room air  -CE     Pre Patient Position Sitting  -CE     Intra Patient Position Standing  -CE     Post Patient Position Supine  -CE       Row Name 08/28/23 1414          Positioning and Restraints    Pre-Treatment Position sitting in chair/recliner  -CE     Post Treatment Position bed  -CE     In Bed notified nsg;fowlers;call light within reach;encouraged to call for assist;exit alarm on  -CE               User Key  (r) = Recorded By, (t) = Taken By, (c) = Cosigned By      Initials Name Provider Type    CE Juanita Palacios, OT Occupational Therapist                   Outcome Measures       Row Name 08/28/23 1419          How much help from another is currently needed...    Putting on and taking off regular lower body clothing? 3  -CE     Bathing (including washing, rinsing, and drying) 3  -CE     Toileting (which includes using toilet bed pan or urinal) 3  -CE     Putting on and taking off regular upper body clothing 3  -CE     Taking care of personal grooming (such as brushing teeth) 3  -CE     Eating meals 4  -CE     AM-PAC 6 Clicks Score (OT) 19  -CE       Row Name 08/28/23 1314          How much help from another person do you currently need...    Turning from your back to your side while in flat bed without using bedrails? 4  -PC     Moving from lying on back to sitting on the side of a flat bed without bedrails? 4  -PC      Moving to and from a bed to a chair (including a wheelchair)? 3  -PC     Standing up from a chair using your arms (e.g., wheelchair, bedside chair)? 3  -PC     Climbing 3-5 steps with a railing? 3  -PC     To walk in hospital room? 3  -PC     AM-PAC 6 Clicks Score (PT) 20  -PC     Highest level of mobility 6 --> Walked 10 steps or more  -PC       Row Name 08/28/23 1419          Functional Assessment    Outcome Measure Options AM-PAC 6 Clicks Daily Activity (OT)  -CE               User Key  (r) = Recorded By, (t) = Taken By, (c) = Cosigned By      Initials Name Provider Type    PC Racheal Wheeler, PT Physical Therapist    Juanita Silverio OT Occupational Therapist                    Occupational Therapy Education       Title: PT OT SLP Therapies (In Progress)       Topic: Occupational Therapy (Done)       Point: ADL training (Done)       Description:   Instruct learner(s) on proper safety adaptation and remediation techniques during self care or transfers.   Instruct in proper use of assistive devices.                  Learning Progress Summary             Patient Acceptance, E, VU,NR by CE at 8/28/2023 1420                         Point: Home exercise program (Done)       Description:   Instruct learner(s) on appropriate technique for monitoring, assisting and/or progressing therapeutic exercises/activities.                  Learning Progress Summary             Patient Acceptance, E, VU,NR by CE at 8/28/2023 1420                         Point: Precautions (Done)       Description:   Instruct learner(s) on prescribed precautions during self-care and functional transfers.                  Learning Progress Summary             Patient Acceptance, E, VU,NR by CE at 8/28/2023 1420                                         User Key       Initials Effective Dates Name Provider Type Discipline    CE 10/17/22 -  Juanita Palacios OT Occupational Therapist OT                  OT Recommendation and Plan  Planned Therapy  Interventions (OT): activity tolerance training, patient/caregiver education/training, adaptive equipment training, BADL retraining, occupation/activity based interventions, strengthening exercise, transfer/mobility retraining, functional balance retraining  Therapy Frequency (OT): 3 times/wk  Plan of Care Review  Outcome Evaluation: Pt seen for OT eval after admission with COVID and VETO. Per chart pt was living alone, however, family with safety concerns and home not safe for pt to return to alone. Pt was able to follow 1-step commands for basic, familiar tasks but required max cues and education on role of OT. Pt able to mobilize with CGA x 1 using FWW with cues for safe mgt of AD. Pt does have flexed posture when standing and required cues to stand tall. Pt able to complete grooming standing at sink <5 min with CGA using FWW. OT will con't to follow for stated goals and recommend dc/ to SNF.     Time Calculation:   Evaluation Complexity (OT)  Review Occupational Profile/Medical/Therapy History Complexity: expanded/moderate complexity  Assessment, Occupational Performance/Identification of Deficit Complexity: 3-5 performance deficits  Clinical Decision Making Complexity (OT): detailed assessment/moderate complexity  Overall Complexity of Evaluation (OT): moderate complexity     Time Calculation- OT       Row Name 08/28/23 1420             Time Calculation- OT    OT Start Time 1308  -CE      OT Stop Time 1327  -CE      OT Time Calculation (min) 19 min  -CE      OT Received On 08/28/23  -CE      OT - Next Appointment 08/30/23  -CE      OT Goal Re-Cert Due Date 09/11/23  -CE                User Key  (r) = Recorded By, (t) = Taken By, (c) = Cosigned By      Initials Name Provider Type    CE Juanita Palacios OT Occupational Therapist                  Therapy Charges for Today       Code Description Service Date Service Provider Modifiers Qty    [id number]  OT EVAL MOD COMPLEXITY 3 8/28/2023 Juanita Palacios OT  GO 1                 Juanita Palacios, OT  8/28/2023

## 2023-08-28 NOTE — PLAN OF CARE
Goal Outcome Evaluation:VSS, afebrile, sleeping on and off the morning, medications taken whole with water, attempts to get OOB w/o assist, bed alarm and chair alarm in place. CT head done today, DTR phoned to check on pt stating that she was sick and would not be able to come visit patient. Sitter outside room monitoring from hallway window. Will cont to monitor.

## 2023-08-28 NOTE — PLAN OF CARE
Goal Outcome Evaluation:  Plan of Care Reviewed With: patient        Progress: improving  Outcome Evaluation: Pt demonstrates unsteady gait, improved with using a rolling walker, able to walk 40 ft with rwx with CGA and a few standing rests, at current level he is a fall risk and not safe to return home alone

## 2023-08-28 NOTE — PLAN OF CARE
Problem: Fall Injury Risk  Goal: Absence of Fall and Fall-Related Injury  Outcome: Ongoing, Progressing   Goal Outcome Evaluation:           Progress: no change  Outcome Evaluation: Pt transferred from [address] [place] [address], alert and oriented x2, vss, on ra, pt fairly pleasant, but not cooperative at times, gets out of bed/chair unassisted, turns off chair alarm, daughter called to see if she could come sit with pt she was unable to, sitter obtained, pt has dry cough,  dim lung sounds.

## 2023-08-28 NOTE — PLAN OF CARE
Goal Outcome Evaluation:              Outcome Evaluation: Pt seen for OT eval after admission with COVID and VETO. Per chart pt was living alone, however, family with safety concerns and home not safe for pt to return to alone. Pt was able to follow 1-step commands for basic, familiar tasks but required max cues and education on role of OT. Pt able to mobilize with CGA x 1 using FWW with cues for safe mgt of AD. Pt does have flexed posture when standing and required cues to stand tall. Pt able to complete grooming standing at sink <5 min with CGA using FWW. OT will con't to follow for stated goals and recommend dc/ to SNF.      Anticipated Discharge Disposition (OT): skilled nursing facility

## 2023-08-29 PROCEDURE — 25010000002 ENOXAPARIN PER 10 MG: Performed by: NURSE PRACTITIONER

## 2023-08-29 RX ORDER — OLANZAPINE 5 MG/1
5 TABLET ORAL NIGHTLY
Status: DISCONTINUED | OUTPATIENT
Start: 2023-08-29 | End: 2023-08-30

## 2023-08-29 RX ADMIN — OLANZAPINE 5 MG: 10 INJECTION, POWDER, LYOPHILIZED, FOR SOLUTION INTRAMUSCULAR at 15:05

## 2023-08-29 RX ADMIN — DEXTROMETHORPHAN 60 MG: 30 SUSPENSION, EXTENDED RELEASE ORAL at 09:51

## 2023-08-29 RX ADMIN — DEXTROMETHORPHAN 60 MG: 30 SUSPENSION, EXTENDED RELEASE ORAL at 22:00

## 2023-08-29 RX ADMIN — ENOXAPARIN SODIUM 40 MG: 100 INJECTION SUBCUTANEOUS at 09:50

## 2023-08-29 RX ADMIN — Medication 10 ML: at 09:51

## 2023-08-29 RX ADMIN — Medication 10 ML: at 20:53

## 2023-08-29 NOTE — PROGRESS NOTES
Name: Jewel Best ADMIT: 2023   : 1940  PCP: Ernie Panda MD    MRN: [id number] LOS: 3 days   AGE/SEX: 83 y.o. male  ROOM: [facility]     Subjective   Subjective     No events overnight. He's calm and cooperative this morning. Hasn't required any prn antipsychotics in a couple of days       Objective   Objective   Vital Signs  Temp:  [96.2 °F (35.7 °C)-99.5 °F (37.5 °C)] 99.5 °F (37.5 °C)  Heart Rate:  [76-84] 76  Resp:  [20] 20  BP: ()/(54-71) 106/54  SpO2:  [94 %-98 %] 94 %  on   ;   Device (Oxygen Therapy): room air  Body mass index is 25.18 kg/m².  Physical Exam  Constitutional:       General: He is not in acute distress.     Appearance: He is not toxic-appearing.   Cardiovascular:      Rate and Rhythm: Normal rate and regular rhythm.      Heart sounds: Normal heart sounds.   Pulmonary:      Effort: Pulmonary effort is normal. No respiratory distress.      Breath sounds: Normal breath sounds. No wheezing, rhonchi or rales.   Abdominal:      General: Bowel sounds are normal.      Palpations: Abdomen is soft.   Musculoskeletal:         General: No tenderness.      Right lower leg: No edema.      Left lower leg: No edema.   Neurological:      General: No focal deficit present.      Mental Status: He is disoriented.   Psychiatric:         Mood and Affect: Mood normal.         Behavior: Behavior normal.       Results Review     I reviewed the patient's new clinical results.  Results from last 7 days   Lab Units 23  0445 23  1241 23  0331 23  2113   WBC 10*3/mm3 4.42 4.45 6.46 8.30   HEMOGLOBIN g/dL 11.1* 12.1* 11.5* 13.1   PLATELETS 10*3/mm3 220 239 206 245       Results from last 7 days   Lab Units 23  0445 23  1406 23  1241 23  0331   SODIUM mmol/L 144 143 144 139   POTASSIUM mmol/L 3.9 4.1 3.8 3.8   CHLORIDE mmol/L 110* 111* 110* 101   CO2 mmol/L 24.4 20.0* 25.0 25.3   BUN mg/dL 16 15 16 31*   CREATININE mg/dL 1.10 1.16 1.13 1.36*    GLUCOSE mg/dL 79 102* 94 105*     Estimated Creatinine Clearance: 56 mL/min (by C-G formula based on SCr of 1.1 mg/dL).  Results from last 7 days   Lab Units 08/27/23  1241 08/25/23 2113   ALBUMIN g/dL 3.9 4.2   BILIRUBIN mg/dL 0.3 0.3   ALK PHOS U/L 57 68   AST (SGOT) U/L 86* 113*   ALT (SGPT) U/L 41 42*       Results from last 7 days   Lab Units 08/28/23  0445 08/27/23  1406 08/27/23  1241 08/26/23  0331 08/25/23 2113   CALCIUM mg/dL 8.1* 8.2* 8.4* 8.3* 8.7   ALBUMIN g/dL  --   --  3.9  --  4.2       Results from last 7 days   Lab Units 08/25/23 2113   PROCALCITONIN ng/mL 0.32*   LACTATE mmol/L 1.9       COVID19   Date Value Ref Range Status   08/25/2023 Detected (C) Not Detected - Ref. Range Final     No results found for: HGBA1C, POCGLU    CT Head Without Contrast  Narrative: CT SCAN OF THE HEAD WITHOUT CONTRAST 08/28/2023     CLINICAL HISTORY: Patient demonstrated some sundowning with intermittent  agitation, is COVID-positive.      TECHNIQUE: Spiral CT images were obtained from the base of the skull to  the vertex without intravenous contrast. The images were reformatted and  submitted in 3 mm thick axial, sagittal and coronal CT sections with  brain algorithm.      There are no prior studies from [facility] for  comparison.     FINDINGS: There is some patchy low density extending from the  periventricular into the subcortical white matter of the cerebral  hemispheres consistent with mild-to-moderate small vessel disease. The  remainder of the brain parenchyma is normal in attenuation. There is  diffuse cerebral atrophy.  The lateral and third ventricles are  prominent in size, felt to be due to central volume loss or atrophy. I  see no focal mass effect and no midline shift. No extra-axial fluid  collections are identified. There is no evidence of acute intracranial  hemorrhage. There is minimal bilateral anterior ethmoid sinus mucosal  thickening. The remainder of the paranasal sinuses  and the mastoid air  cells and the middle ear cavity are clear. The calvarium and skull base  are normal in appearance. There are bilateral intraocular lens implants  in the globes from previous cataract surgery, otherwise the orbits are  unremarkable.     Impression: 1. No acute intracranial abnormality is identified.   2. There is mild-to-moderate small vessel disease in the cerebral white  matter and there is diffuse cerebral atrophy and the lateral and third  ventricles are prominent in size that is most probably on the basis of  central volume loss or atrophy. Bilateral intraocular lens implants are  present from previous cataract surgery. The remainder of the head CT is  within normal limits.     Radiation dose reduction techniques were utilized, including automated  exposure control and exposure modulation based on body size.        This report was finalized on 8/28/2023 4:36 PM by Dr. John Guerra M.D.       Scheduled Medications  dextromethorphan polistirex ER, 60 mg, Oral, Q12H  enoxaparin, 40 mg, Subcutaneous, Q24H  Nirmatrelvir&Ritonavir 150/100, 2 tablet, Oral, BID  sodium chloride, 10 mL, Intravenous, Q12H    Infusions     Diet  Diet: Regular/House Diet; Texture: Regular Texture (IDDSI 7); Fluid Consistency: Thin (IDDSI 0)       Assessment/Plan     Active Hospital Problems    Diagnosis  POA    **COVID-19 [U07.1]  Yes    VETO (acute kidney injury) [N17.9]  Unknown    Hyperlipidemia [E78.5]  Yes    Benign essential hypertension [I10]  Yes    Anemia [D64.9]  Yes      Resolved Hospital Problems   No resolved problems to display.       83 y.o. male admitted with COVID-19.    COVID-19 without hypoxemia-finishing a 5 day course of paxlovid  VETO-resolved with IVF  Waxing and waning confusion and agitation-likely underlying dementia. Tsh was slightly elevated, but free t4 was normal. B12 was normal. Rpr and hiv were normal. Head CT doesn't show any acute findings. Neurology is planning an outpatient dementia  workup.  Subclinical hypothyroidism-no indication for treatment. Would just recheck a tsh as an outpatient in a couple of months  Hypertension-doesn't take his medications at home, but bp is fine here  Hyperlipidemia-he doesn't take any medications for this at home.  Depression-doesn't take his medications  History of lymphoma in remission  Lovenox 40 mg SC daily for DVT prophylaxis.  Full code.  Discussed with patient.  Anticipate discharge to SNU facility once arrangements have been made.     Contacted by RN that patient has become agitated and required I'm zyprexa. I will schedule some zyprexa for this evening and ask psychiatry to assist with behavioral disturbance.      Jean Flores MD  [facility]  08/29/23  14:57 EDT    I wore protective equipment throughout this patient encounter including a face mask, gloves and protective eyewear.  Hand hygiene was performed before donning protective equipment and after removal when leaving the room.

## 2023-08-29 NOTE — CASE MANAGEMENT/SOCIAL WORK
Discharge Planning Assessment  [facility]     Patient Name: Jewel Best  MRN: [id number]  Today's Date: 8/29/2023    Admit Date: 8/25/2023    Plan: Home vs HH vs SNF.   Discharge Needs Assessment       Row Name 08/29/23 1419       Living Environment    People in Home alone    Current Living Arrangements home    Primary Care Provided by self    Provides Primary Care For no one    Family Caregiver if Needed child(tip), adult    Quality of Family Relationships helpful       Transition Planning    Patient/Family Anticipates Transition to home    Transportation Anticipated family or friend will provide;health plan transportation       Discharge Needs Assessment    Readmission Within the Last 30 Days no previous admission in last 30 days    Equipment Currently Used at Home cane, straight                   Discharge Plan       Row Name 08/29/23 1420       Plan    Plan Home vs HH vs SNF.    Patient/Family in Agreement with Plan yes    Plan Comments Per Epic documentation and nursing staff, the patient is not fully alert/oriented at this time. Patient also does not have an advaned directive (healthcare surrogate paperwork) on file in [facility]. CCP discussed case with Kim BARROW RN/[facility] High Risk Coordinator. CCP can speak with/obtain information from the patient's only emergency contact and next of kin - his daughter/Meli Best. Spoke with Meli who said she is the patient's medical power of  and will bring her POA paperwork to [facility] today. CCP explained the role of the CCP. Meli said the patient lives alone and has family support through her. He's overall IADL, however she helps the patient with groceries, cleaning and running errands at times. He has no history with  and she cannot remember the  agency he's worked with in the past. Meli said she plans for the patient to d/c to SNF. She's agreeable with broad SNF referrals. Referrals sent in [facility]. CCP to follow.                  Continued Care and  Services - Admitted Since 8/25/2023       Destination       Service Provider Request Status Selected Services Address Phone Fax Patient Preferred    [facility] Pending - Request Sent N/A [address] [facility] [phone] --    [facility] Pending - Request Sent N/A [address] [facility] [address] [phone] --    [facility] Pending - Request Sent N/A [address] [facility] [address] [phone] --    [facility] - [facility] [address] [facility] [address] [phone] --    [facility]ending - Request Sent N/A [address] [facility] [phone] --    [facility] Pending - Request Sent N/A [address] [facility] [id number] [phone] --    [facility] Pending - Request Sent N/A [address] [facility] [id number] [phone] --                  Expected Discharge Date and Time       Expected Discharge Date Expected Discharge Time    Aug 30, 2023            Demographic Summary       Row Name 08/29/23 1418       General Information    Admission Type inpatient    Reason for Consult discharge planning    Preferred Language English       Contact Information    Permission Granted to Share Info With ;family/designee                   Functional Status       Row Name 08/29/23 1418       Functional Status    Usual Activity Tolerance moderate       Functional Status, IADL    Medications independent    Meal Preparation independent    Housekeeping independent    Laundry independent    Shopping independent                    Nakia BOATENG, RN

## 2023-08-29 NOTE — DISCHARGE PLACEMENT REQUEST
"Jasper Best (83 y.o. Male)       Date of Birth   1940    Social Security Number       Address   [address] [place] [address]    Home Phone   [phone]    MRN   [id number]       Alevism   None    Marital Status                               Admission Date   8/25/23    Admission Type   Emergency    Admitting Provider   Brannon Holder MD    Attending Provider   Jean Flores MD    Department, Room/Bed   [address], P896/1       Discharge Date       Discharge Disposition       Discharge Destination                                 Attending Provider: Jean Flores MD    Allergies: No Known Allergies    Isolation: Enh Drop/Con   Infection: COVID (confirmed) (08/25/23)   Code Status: CPR    Ht: 179.1 cm (70.5\")   Wt: 80.7 kg (178 lb)    Admission Cmt: None   Principal Problem: COVID-19 [U07.1]                   Active Insurance as of 8/25/2023       Primary Coverage       Payor Plan Insurance Group Employer/Plan Group    MEDICARE MEDICARE A & B        Payor Plan Address Payor Plan Phone Number Payor Plan Fax Number Effective Dates    [address] [phone]  6/1/2003 - None Entered    [facility] [id number]         Subscriber Name Subscriber Birth Date Member ID       JASPER BEST 1940 [id number]               Secondary Coverage       Payor Plan Insurance Group Employer/Plan Group    [employer]                  Payor Plan Address Payor Plan Phone Number Payor Plan Fax Number Effective Dates    [address]   4/1/2018 - None Entered    [place] [id number]         Subscriber Name Subscriber Birth Date Member ID       JASPER BEST 1940 [id number]                     Emergency Contacts        (Rel.) Home Phone Work Phone Mobile Phone    Meli Best (POA) (Daughter) -- -- [phone]              "

## 2023-08-29 NOTE — PLAN OF CARE
Goal Outcome Evaluation:           Progress: no change  Outcome Evaluation: PT admitted with COVID. Confused and non compliant. Alert to self. Sitter at bedside as patient is very impulsive and does not call out for help. Dry cough, patient denies pain. Discharge to SNF pending.

## 2023-08-30 ENCOUNTER — TELEPHONE (OUTPATIENT)
Dept: FAMILY MEDICINE CLINIC | Facility: CLINIC | Age: 83
End: 2023-08-30
Payer: MEDICARE

## 2023-08-30 LAB
BACTERIA SPEC AEROBE CULT: NORMAL
BACTERIA SPEC AEROBE CULT: NORMAL

## 2023-08-30 RX ORDER — DULOXETIN HYDROCHLORIDE 60 MG/1
60 CAPSULE, DELAYED RELEASE ORAL DAILY
Status: DISCONTINUED | OUTPATIENT
Start: 2023-08-30 | End: 2023-08-31 | Stop reason: HOSPADM

## 2023-08-30 RX ADMIN — DEXTROMETHORPHAN 60 MG: 30 SUSPENSION, EXTENDED RELEASE ORAL at 21:59

## 2023-08-30 RX ADMIN — ACETAMINOPHEN 650 MG: 325 TABLET, FILM COATED ORAL at 21:59

## 2023-08-30 RX ADMIN — Medication 10 ML: at 22:00

## 2023-08-30 NOTE — NURSING NOTE
Pt has requested to go home. And daughter CHERELLE Garrison said if he wants to go home its ok with her.

## 2023-08-30 NOTE — TELEPHONE ENCOUNTER
Caller: Jewel Best    Relationship: Self    Best call back number: [phone]     Who are you requesting to speak with (clinical staff, provider,  specific staff member):     What was the call regarding: PATIENT IN THE HOSPITAL AND WANTS TO SPEAK TO DR. MONTANA ASAP PLEASE   The patient wanted to be sure I was aware he was in the hospital.  I did advise that I was aware and had reviewed his tests.

## 2023-08-30 NOTE — PROGRESS NOTES
Name: Jewel Best ADMIT: 2023   : 1940  PCP: Ernie Panda MD    MRN: [id number] LOS: 4 days   AGE/SEX: 83 y.o. male  ROOM: [facility]     Subjective   Subjective     Patient has been very belligerent today.  Has yelled at staff repeatedly.  Calling 911 from his hospital room.  Denies any current complaints at time of evaluation.       Objective   Objective   Vital Signs  Temp:  [96.5 °F (35.8 °C)-98.4 °F (36.9 °C)] 96.5 °F (35.8 °C)  Heart Rate:  [79-83] 83  Resp:  [20] 20  BP: (116-152)/(63-69) 116/65     on   ;   Device (Oxygen Therapy): room air  Body mass index is 25.18 kg/m².  General: Agitated, shouting profanities  ENT: No conjunctival injection or scleral icterus.   Neuro: Eyes open and moving in all directions, no focal deficits.     Patient refuses all additional physical exam.  The above was obtained by observation.    Results Review     I reviewed the patient's new clinical results.  Results from last 7 days   Lab Units 23  12423   WBC 10*3/mm3 4.42 4.45 6.46 8.30   HEMOGLOBIN g/dL 11.1* 12.1* 11.5* 13.1   PLATELETS 10*3/mm3 220 239 206 245     Results from last 7 days   Lab Units 235 23  1406 23  1241 23  033   SODIUM mmol/L 144 143 144 139   POTASSIUM mmol/L 3.9 4.1 3.8 3.8   CHLORIDE mmol/L 110* 111* 110* 101   CO2 mmol/L 24.4 20.0* 25.0 25.3   BUN mg/dL 16 15 16 31*   CREATININE mg/dL 1.10 1.16 1.13 1.36*   GLUCOSE mg/dL 79 102* 94 105*   Estimated Creatinine Clearance: 56 mL/min (by C-G formula based on SCr of 1.1 mg/dL).  Results from last 7 days   Lab Units 23  1241 08/25/23  2113   ALBUMIN g/dL 3.9 4.2   BILIRUBIN mg/dL 0.3 0.3   ALK PHOS U/L 57 68   AST (SGOT) U/L 86* 113*   ALT (SGPT) U/L 41 42*     Results from last 7 days   Lab Units 23  0445 23  1406 23  1241 23  0331 23  2113   CALCIUM mg/dL 8.1* 8.2* 8.4* 8.3* 8.7   ALBUMIN g/dL  --   --  3.9  --   4.2     Results from last 7 days   Lab Units 08/25/23 2113   PROCALCITONIN ng/mL 0.32*   LACTATE mmol/L 1.9     COVID19   Date Value Ref Range Status   08/25/2023 Detected (C) Not Detected - Ref. Range Final     No results found for: HGBA1C, POCGLU    CT Head Without Contrast  Narrative: CT SCAN OF THE HEAD WITHOUT CONTRAST 08/28/2023     CLINICAL HISTORY: Patient demonstrated some sundowning with intermittent  agitation, is COVID-positive.      TECHNIQUE: Spiral CT images were obtained from the base of the skull to  the vertex without intravenous contrast. The images were reformatted and  submitted in 3 mm thick axial, sagittal and coronal CT sections with  brain algorithm.      There are no prior studies from [facility] for  comparison.     FINDINGS: There is some patchy low density extending from the  periventricular into the subcortical white matter of the cerebral  hemispheres consistent with mild-to-moderate small vessel disease. The  remainder of the brain parenchyma is normal in attenuation. There is  diffuse cerebral atrophy.  The lateral and third ventricles are  prominent in size, felt to be due to central volume loss or atrophy. I  see no focal mass effect and no midline shift. No extra-axial fluid  collections are identified. There is no evidence of acute intracranial  hemorrhage. There is minimal bilateral anterior ethmoid sinus mucosal  thickening. The remainder of the paranasal sinuses and the mastoid air  cells and the middle ear cavity are clear. The calvarium and skull base  are normal in appearance. There are bilateral intraocular lens implants  in the globes from previous cataract surgery, otherwise the orbits are  unremarkable.     Impression: 1. No acute intracranial abnormality is identified.   2. There is mild-to-moderate small vessel disease in the cerebral white  matter and there is diffuse cerebral atrophy and the lateral and third  ventricles are prominent in size that is  most probably on the basis of  central volume loss or atrophy. Bilateral intraocular lens implants are  present from previous cataract surgery. The remainder of the head CT is  within normal limits.     Radiation dose reduction techniques were utilized, including automated  exposure control and exposure modulation based on body size.        This report was finalized on 8/28/2023 4:36 PM by Dr. John Guerra M.D.       Scheduled Medications  dextromethorphan polistirex ER, 60 mg, Oral, Q12H  DULoxetine, 60 mg, Oral, Daily  enoxaparin, 40 mg, Subcutaneous, Q24H  Nirmatrelvir&Ritonavir 150/100, 2 tablet, Oral, BID  sodium chloride, 10 mL, Intravenous, Q12H    Infusions     Diet  Diet: Regular/House Diet; Texture: Regular Texture (IDDSI 7); Fluid Consistency: Thin (IDDSI 0)       Assessment/Plan     Active Hospital Problems    Diagnosis  POA    **COVID-19 [U07.1]  Yes    VETO (acute kidney injury) [N17.9]  Unknown    Hyperlipidemia [E78.5]  Yes    Benign essential hypertension [I10]  Yes    Anemia [D64.9]  Yes      Resolved Hospital Problems   No resolved problems to display.       83 y.o. male admitted with COVID-19.    COVID-19 without hypoxemia-5-day course of Paxlovid, end date 8/30  VETO-resolved with IVF  Waxing and waning confusion and agitation-likely underlying dementia. Tsh was slightly elevated, but free t4 was normal. B12 was normal. Rpr and hiv were normal. Head CT doesn't show any acute findings. Neurology is planning an outpatient dementia workup.  See documentation below for further plan regarding mental status.  Subclinical hypothyroidism-no indication for treatment.  Recheck TSH as an outpatient.  Hypertension-taking home medications, monitoring here.  BP overall well controlled.  Hyperlipidemia-not taking home statin  Depression-not take home medications, psychiatry consulted as below.  History of lymphoma in remission    Lovenox 40 mg SC daily for DVT prophylaxis.  Full code.  Discussed with patient,  family, and nursing staff.    Extensive amount of time spent on coordination of this patient today.  The patient was verbally aggressive with to this provider and numerous other staff.  Unable to be reasoned with.  He was not cooperative with exam or questioning.  Did have psychiatry come and see him, but that evaluation was limited due to poor patient cooperation.  Today is my first time seeing patient, but based on chart review and my exam today I do not feel that patient is decisional.  He continues to refuse all interventions, medications, or assessments from providers.  Neurology has previously commented that it is not safe for this patient to discharge home alone. His disruptive mood and behavior make it difficult to assess decisional capacity, however with these behaviors he is showing an inability to understand what is happening,  therefore I do not feel is able to make rational decisions for himself or have understanding of his current condition. I spoke with patient's daughter multiple times via phone.  She does not feel that the patient is safe for discharge home.  Reports that he has been endorsing suicidal ideation for many years.  They have removed all guns and weapons from home.  She is worried that if he were to discharge home, he may hurt another individual or himself.  Spoke with Dr. Finnegan via phone.  At this time, he is recommending that the patient's daughter obtain an MIW given my continuing concern for decisional capacity.  [facility] spoke with daughter to assist her in this process.  They also contacted [facility] and [facility], both of who said they are not taking COVID-positive patients at this time.  The patient continued to cause behavioral disruptions throughout the afternoon.  At the point he was demanding to leave, he was placed on a 72-hour hold.  Updated Dr. Finnegan on this and he will evaluate patient again tomorrow.  I will also follow-up with the patient's daughter via  phone tomorrow for an update on the MIW process.  Given this patient's COVID-positive status, unwillingness to cooperate with exam/questioning, and lack of inpatient psychiatric facility at this hospital, this remains a complex situation in terms of further disposition for this patient.     Greater than 50 minutes of clinical time was spent reviewing this patient's chart, speaking with consultants teams, speaking with family members, and arranging further treatment of this patient.    Margaret Christina MD  [facility]  08/30/23  17:43 EDT

## 2023-08-30 NOTE — CONSULTS
"IDENTIFYING INFORMATION: The patient is an 83-year-old white male admitted with complaints of weakness.  He has been diagnosed as positive for COVID-19.  He is seen by psychiatry related to a history of dementia and anxiety.    CHIEF COMPLAINT: None given    INFORMANT: Patient and chart    RELIABILITY: Poor    HISTORY OF PRESENT ILLNESS: Patient is an 83-year-old white male admitted with complaints of weakness.  He has been diagnosis positive for COVID-19.  The chart indicates a possible history of a dementia diagnosis though the patient is on no medication for this.  He has been on Zyprexa 5 mg nightly as well as as needed Zyprexa for agitation.  When seen today the patient is calm but is rude and insulting in his demeanor.  He reportedly has been on Cymbalta and lives alone.  During the course of interview, the patient reports no suicidal or homicidal ideation and denies prior history of psychiatric treatment.  He does demand that this physician remove his N95 mask because \"I cannot understand it\" and becomes angry when informed that this is not an option.  He also takes the opportunity to insult this physician's specialty and choice of apparel.  Charting indicates that the patient is requesting to go home and that the POA has approved of this.    PAST PSYCHIATRIC HISTORY: The chart indicates that the patient has been on Cymbalta in the past but this medication was not restarted during this hospitalization    PAST MEDICAL HISTORY: Significant for anemia, bladder cancer, hyperlipidemia, hypertension, lymphoma    MEDICATIONS:   Current Facility-Administered Medications   Medication Dose Route Frequency Provider Last Rate Last Admin    acetaminophen (TYLENOL) tablet 650 mg  650 mg Oral Q4H PRN Dulce Bass APRN        Or    acetaminophen (TYLENOL) 160 MG/5ML solution 650 mg  650 mg Oral Q4H PRN Dulce Bass APRN   650 mg at 08/27/23 0254    Or    acetaminophen (TYLENOL) suppository 650 mg  650 mg " Rectal Q4H PRN Dulce Bass APRN        benzonatate (TESSALON) capsule 200 mg  200 mg Oral TID PRN Ni Kang APRN   200 mg at 08/28/23 2027    calcium carbonate (TUMS) chewable tablet 500 mg (200 mg elemental)  2 tablet Oral BID PRN Dulce Bass APRN        dextromethorphan polistirex ER (DELSYM) 30 MG/5ML oral suspension 60 mg  60 mg Oral Q12H Ni Kang APRN   60 mg at 08/29/23 2200    Enoxaparin Sodium (LOVENOX) syringe 40 mg  40 mg Subcutaneous Q24H Dulce Bass APRN   40 mg at 08/29/23 0950    Nirmatrelvir&Ritonavir 150/100 (PAXLOVID) 10 x 150 MG & 10 x 100MG tablet (for renal adjustment) 2 each  2 tablet Oral BID Dulce Bass APRN   2 each at 08/28/23 2028    nitroglycerin (NITROSTAT) SL tablet 0.4 mg  0.4 mg Sublingual Q5 Min PRN Dulce Bass APRN        OLANZapine (zyPREXA) injection 5 mg  5 mg Intramuscular Q8H PRN Jean Flores MD   5 mg at 08/29/23 1505    OLANZapine (zyPREXA) tablet 5 mg  5 mg Oral Nightly Jean Flores MD        ondansetron (ZOFRAN) tablet 4 mg  4 mg Oral Q6H PRN Dulce Bass APRN        Or    ondansetron (ZOFRAN) injection 4 mg  4 mg Intravenous Q6H PRN Dulce Bass APRN        sodium chloride 0.9 % flush 10 mL  10 mL Intravenous PRN Albert Roman MD        sodium chloride 0.9 % flush 10 mL  10 mL Intravenous Q12H Dulce Bass APRN   10 mL at 08/29/23 2053    sodium chloride 0.9 % flush 10 mL  10 mL Intravenous PRN Dulce Bass APRN        sodium chloride 0.9 % infusion 40 mL  40 mL Intravenous PRN Dulce Bass APRN             ALLERGIES: None    FAMILY HISTORY: Not obtained    SOCIAL HISTORY: The patient reportedly lives alone.  He does not provide further social history    MENTAL STATUS EXAM: The patient is an elderly white male appearing his stated age.  He has no apparent physical distress at the time of examination.  He does not allow for formal testing of memory or  cognition orientation and terminates the interview prematurely.    ASSETS/LIABILITIES: To be assessed/difficult pre-existing personality style    DIAGNOSTIC IMPRESSION: Major depressive disorder by history, medical problems as noted previously, COVID-19    PLAN: Unfortunately, the patient does not allow this physician to perform a mental status examination or further explore any depressive issues.  I would recommend reinitiation of duloxetine and would not continue with Zyprexa if in fact the patient is to be discharged today.  Little else to add, I will sign off

## 2023-08-30 NOTE — CONSULTS
Patient seen earlier today by psychiatrist.  Patient was uncooperative with full assessment at that time, hence Dr. Finnegan unable to determine patient's capacity (see his note for further details).  As a result, Dr. Finnegan recommends patient's daughter obtain an MIW as decisional capacity remains a concern per attending MD.  Spoke with patient's daughter, Meli.  Phone number, address, and brief explanation about the MIW process provided.  Out of curiosity, spoke with representatives at [facility] and [facility]; neither are taking COVID positive patients at this time.       Access following.

## 2023-08-30 NOTE — NURSING NOTE
Patient has refused all care from this nurse during shift. No assessment or medications able to be given. Patient has been very verbally aggressive and has had moments of confusion where patient called 911 and his  to have him discharged. Spoke with daughter regarding patient and MD. At 1600 patient was placed on a 72 hour hold. Patient attempted to leave room and refused to return to room. Security called and patient was returned to bed by security. 72 hour hold explained by . Daughter notified of situation.

## 2023-08-30 NOTE — NURSING NOTE
Spoke with daughter regarding transportation at discharge. Was told patient would need to go by cab as daughter would not be picking patient up due to his behavior towards her.

## 2023-08-30 NOTE — SIGNIFICANT NOTE
08/30/23 1422   OTHER   Discipline physical therapist   Rehab Time/Intention   Session Not Performed other (see comments)  (pt has been agitated and refusing care with possible d/c today, will follow up tomorrow if pt is still here and is appropriate to see)   Recommendation   PT - Next Appointment 08/31/23

## 2023-08-30 NOTE — CASE MANAGEMENT/SOCIAL WORK
Continued Stay Note  [facility]     Patient Name: Jewel Best  MRN: [id number]  Today's Date: 8/30/2023    Admit Date: 8/25/2023    Plan: Home vs HH vs SNF.   Discharge Plan       Row Name 08/30/23 1628       Plan    Plan Comments CCP discussed this patient's case with Kim BARROW RN/[facility] High Risk Coordinator, and she will follow.                   Discharge Codes    No documentation.                 Expected Discharge Date and Time       Expected Discharge Date Expected Discharge Time    Aug 30, 2023               Nakia BOATENG RN

## 2023-08-31 ENCOUNTER — TELEPHONE (OUTPATIENT)
Dept: NEUROLOGY | Facility: CLINIC | Age: 83
End: 2023-08-31
Payer: MEDICARE

## 2023-08-31 ENCOUNTER — READMISSION MANAGEMENT (OUTPATIENT)
Dept: CALL CENTER | Facility: HOSPITAL | Age: 83
End: 2023-08-31
Payer: MEDICARE

## 2023-08-31 ENCOUNTER — TELEPHONE (OUTPATIENT)
Dept: FAMILY MEDICINE CLINIC | Facility: CLINIC | Age: 83
End: 2023-08-31
Payer: MEDICARE

## 2023-08-31 VITALS
BODY MASS INDEX: 24.92 KG/M2 | SYSTOLIC BLOOD PRESSURE: 118 MMHG | TEMPERATURE: 97 F | RESPIRATION RATE: 18 BRPM | OXYGEN SATURATION: 94 % | DIASTOLIC BLOOD PRESSURE: 65 MMHG | HEIGHT: 71 IN | WEIGHT: 178 LBS | HEART RATE: 73 BPM

## 2023-08-31 PROBLEM — G31.84 MILD COGNITIVE IMPAIRMENT: Status: ACTIVE | Noted: 2023-08-31

## 2023-08-31 LAB
ALBUMIN SERPL-MCNC: 3.3 G/DL (ref 3.5–5.2)
ALBUMIN/GLOB SERPL: 1.3 G/DL
ALP SERPL-CCNC: 49 U/L (ref 39–117)
ALT SERPL W P-5'-P-CCNC: 30 U/L (ref 1–41)
ANION GAP SERPL CALCULATED.3IONS-SCNC: 7.7 MMOL/L (ref 5–15)
AST SERPL-CCNC: 34 U/L (ref 1–40)
BILIRUB SERPL-MCNC: 0.3 MG/DL (ref 0–1.2)
BUN SERPL-MCNC: 17 MG/DL (ref 8–23)
BUN/CREAT SERPL: 15.9 (ref 7–25)
CALCIUM SPEC-SCNC: 9 MG/DL (ref 8.6–10.5)
CHLORIDE SERPL-SCNC: 105 MMOL/L (ref 98–107)
CO2 SERPL-SCNC: 30.3 MMOL/L (ref 22–29)
CREAT SERPL-MCNC: 1.07 MG/DL (ref 0.76–1.27)
DEPRECATED RDW RBC AUTO: 43.8 FL (ref 37–54)
EGFRCR SERPLBLD CKD-EPI 2021: 68 ML/MIN/1.73
ERYTHROCYTE [DISTWIDTH] IN BLOOD BY AUTOMATED COUNT: 13.5 % (ref 12.3–15.4)
GLOBULIN UR ELPH-MCNC: 2.6 GM/DL
GLUCOSE SERPL-MCNC: 107 MG/DL (ref 65–99)
HCT VFR BLD AUTO: 30.1 % (ref 37.5–51)
HGB BLD-MCNC: 10.3 G/DL (ref 13–17.7)
MAGNESIUM SERPL-MCNC: 1.9 MG/DL (ref 1.6–2.4)
MCH RBC QN AUTO: 30.1 PG (ref 26.6–33)
MCHC RBC AUTO-ENTMCNC: 34.2 G/DL (ref 31.5–35.7)
MCV RBC AUTO: 88 FL (ref 79–97)
PHOSPHATE SERPL-MCNC: 3.1 MG/DL (ref 2.5–4.5)
PLATELET # BLD AUTO: 241 10*3/MM3 (ref 140–450)
PMV BLD AUTO: 10.5 FL (ref 6–12)
POTASSIUM SERPL-SCNC: 3.9 MMOL/L (ref 3.5–5.2)
PROT SERPL-MCNC: 5.9 G/DL (ref 6–8.5)
RBC # BLD AUTO: 3.42 10*6/MM3 (ref 4.14–5.8)
SODIUM SERPL-SCNC: 143 MMOL/L (ref 136–145)
WBC NRBC COR # BLD: 6.95 10*3/MM3 (ref 3.4–10.8)

## 2023-08-31 PROCEDURE — 84100 ASSAY OF PHOSPHORUS: CPT | Performed by: STUDENT IN AN ORGANIZED HEALTH CARE EDUCATION/TRAINING PROGRAM

## 2023-08-31 PROCEDURE — 83735 ASSAY OF MAGNESIUM: CPT | Performed by: STUDENT IN AN ORGANIZED HEALTH CARE EDUCATION/TRAINING PROGRAM

## 2023-08-31 PROCEDURE — 80053 COMPREHEN METABOLIC PANEL: CPT | Performed by: STUDENT IN AN ORGANIZED HEALTH CARE EDUCATION/TRAINING PROGRAM

## 2023-08-31 PROCEDURE — 85027 COMPLETE CBC AUTOMATED: CPT | Performed by: STUDENT IN AN ORGANIZED HEALTH CARE EDUCATION/TRAINING PROGRAM

## 2023-08-31 NOTE — TELEPHONE ENCOUNTER
Caller: Meli Best (POA)    Relationship: Emergency Contact    Best call back number: [phone]     What was the call regarding: PATIENT'S DAUGHTER WOULD LIKE TO SPEAK TO DR MONTANA REGARDING THE PATIENT. HE IS CURRENTLY AT [facility]. MELI STATES THAT THE PATIENT HAS NOT BEEN TAKING HIS MEDICATIONS THAT DR MONTANA HAS PRESCRIBED HIM AND SHE WANTS TO DISCUSS NEXT STEPS AND WANTS TO SEE WHAT DR MONTANA RECOMMENDS TO DO.     MELI IS PATIENT'S POA BUT NOT ON  VERBAL.   I spoke to the patient's daughter about all the medicines that we have tried in his not really taking them for the recommended periods of time.  I did advise that he has an upcoming appointment with a nurse practitioner in psychiatry on September 12 at 3:00.

## 2023-08-31 NOTE — TELEPHONE ENCOUNTER
Spoke with pt and let him know of his appt, he is currently still in hospital so I told him I would send him a reminder in the mail as well for his appt in November with dr parks.

## 2023-08-31 NOTE — DISCHARGE PLACEMENT REQUEST
"Jasper Best (83 y.o. Male)       Date of Birth   1940    Social Security Number       Address   [address] [place] [address]    Home Phone   [phone]    MRN   [id number]       Religious   None    Marital Status                               Admission Date   8/25/23    Admission Type   Emergency    Admitting Provider   Brannon Holder MD    Attending Provider   Margaret Christina MD    Department, Room/Bed   [address], P896/1       Discharge Date       Discharge Disposition   Home or Self Care    Discharge Destination                                 Attending Provider: Margaret Christina MD    Allergies: No Known Allergies    Isolation: Enh Drop/Con   Infection: COVID (confirmed) (08/25/23)   Code Status: CPR    Ht: 179.1 cm (70.5\")   Wt: 80.7 kg (178 lb)    Admission Cmt: None   Principal Problem: COVID-19 [U07.1]                   Active Insurance as of 8/25/2023       Primary Coverage       Payor Plan Insurance Group Employer/Plan Group    MEDICARE MEDICARE A & B        Payor Plan Address Payor Plan Phone Number Payor Plan Fax Number Effective Dates    [address] [phone]  6/1/2003 - None Entered    Formerly [facility] [id number]         Subscriber Name Subscriber Birth Date Member ID       JASPER BEST 1940 [id number]               Secondary Coverage       Payor Plan Insurance Group Employer/Plan Group    [employer]                  Payor Plan Address Payor Plan Phone Number Payor Plan Fax Number Effective Dates    [address]   4/1/2018 - None Entered    [place] [id number]         Subscriber Name Subscriber Birth Date Member ID       JASPER BEST 1940 [id number]                     Emergency Contacts        (Rel.) Home Phone Work Phone Mobile Phone    Meli Best (POA) (Daughter) -- -- [phone]                "

## 2023-08-31 NOTE — CASE MANAGEMENT/SOCIAL WORK
Case Management Discharge Note      Final Note: 72 hour hold dc'd. Spoke with patient and daughter at bedside and plan is home with HH. Referral placed to [facility] and they have accepted. Daughter will transport home. Case discussed with Kim BARROW         Selected Continued Care - Discharged on 8/31/2023 Admission date: 8/25/2023 - Discharge disposition: Home or Self Care      Destination    No services have been selected for the patient.                Durable Medical Equipment    No services have been selected for the patient.                Dialysis/Infusion    No services have been selected for the patient.                Home Medical Care    No services have been selected for the patient.                Therapy    No services have been selected for the patient.                Community Resources    No services have been selected for the patient.                Community & DME    No services have been selected for the patient.                    Transportation Services  Private: Car    Final Discharge Disposition Code: 06 - home with home health care

## 2023-08-31 NOTE — PROGRESS NOTES
The patient is seen again today given concerns on the part of the patient's daughter and Dr. Christina regarding the patient's safety if discharged.  The chart indicates that the patient had a very good evening, fully cooperating with staff sleeping and showing no behavioral problems or concerns.  The patient is cooperative with care when seen today and agrees to undergo mental status examination the results of which are as follows:    The patient is awake alert and oriented x 4.  His mood is euthymic his affect congruent.  Speech is relevant and coherent.  The patient recalls 2 of 3 objects after 5 minutes.  His forward digit span is 7 his reverse digit span is 0.  He denies current suicidal or homicidal ideation or psychotic features.  He does evidence some concreteness on testing of judgment and insight but does not exhibit any severe impairment of judgment or insight.    Test results are consistent with a diagnosis of mild cognitive impairment (G31.84)    I have spoken with the patient's daughter regarding the next steps which should be taken.  At this point, the patient would not meet criteria for mental and quests warrant, and even if he did, his COVID-19 status would prohibit his admission to any psychiatric facility.  I have suggested to the patient's daughter that if at any point she feels as though the patient is in danger she can call 911 and the crisis intervention team can intervene.  Additionally, we have initiated an Adult Protective Services report and I do believe the patient could benefit from home health.    From a psychiatric standpoint, I feel as though he is stable for discharge.

## 2023-08-31 NOTE — OUTREACH NOTE
Prep Survey      Flowsheet Row Responses   [facility] facility patient discharged from? [place]   Is LACE score < 7 ? No   Eligibility [facility]   Date of Admission 08/25/23   Date of Discharge 08/31/23   Discharge Disposition Home or Self Care   Discharge diagnosis COVID-19-Mild cognitive impairment-Major depression   Does the patient have one of the following disease processes/diagnoses(primary or secondary)? Other   Does the patient have Home health ordered? Yes   What is the Home health agency?  [facility]   Is there a DME ordered? No   Prep survey completed? Yes            FELIPE CANADA - Registered Nurse

## 2023-08-31 NOTE — SIGNIFICANT NOTE
08/31/23 113   OTHER   Discipline physical therapist   Rehab Time/Intention   Session Not Performed other (see comments)  (Spoke with Maria Luisa, RN- RN does not feel patient needs PT services acutely anymore and has been observed walking around his room with no mobility issues. Will DC PT at this time. Notfied RN to please reconsult PT if there is any change in mobility.)   Therapy Assessment/Plan (PT)   Criteria for Skilled Interventions Met (PT) does not meet criteria for skilled intervention

## 2023-08-31 NOTE — PROGRESS NOTES
[facility] follow up d/t psychosis; this writer reviewed chart and spoke with RN Rosa. Per RN, patient did great, slept overnight; patient has been alert and oriented, respectful, no behavioral problems and/or concerns noted.  Psychiatrist, Dr. Finnegan, to re-consult today; 72-hour hold in place at this time. No further needs/concerns noted at this time per RN and/or medical team; [facility] to continue following.

## 2023-08-31 NOTE — DISCHARGE SUMMARY
Patient Name: Jewel Best  : 1940  MRN: [id number]    Date of Admission: 2023  Date of Discharge:  2023  Primary Care Physician: Ernie Panda MD      Chief Complaint:   Weakness - Generalized and Flu Symptoms      Discharge Diagnoses     Active Hospital Problems    Diagnosis  POA    **COVID-19 [U07.1]  Yes    Mild cognitive impairment [G31.84]  Unknown    VETO (acute kidney injury) [N17.9]  Unknown    Major depression, single episode [F32.9]  Yes    Hyperlipidemia [E78.5]  Yes    Benign essential hypertension [I10]  Yes    Anemia [D64.9]  Yes      Resolved Hospital Problems   No resolved problems to display.        Hospital Course     Mr. Best is a 83 y.o. male with a history of hypertension, hyperlipidemia, bladder cancer, lymphoma, and depression who presented to [facility] initially complaining of weakness and decreased appetite.  Please see the admitting history and physical for further details.  Daughter had been unable to reach him by phone, so EMS was called.  Patient was found to have stool incontinence and be confused.  Was also noted to be febrile.  He was found to have COVID-19 and VETO and was admitted to the hospital for further evaluation and treatment.      The patient's daughter noted that the patient has had intermittent agitation and sundowning symptoms for years.  He has also been noncompliant with medications as an outpatient.  Neurology was asked to evaluate the patient.  On their evaluation, they felt that exam was most suggestive of moderate Alzheimer's dementia.  They recommended outpatient follow-up with neurology.  A CT head did not reveal any acute intracranial abnormality, but was remarkable for diffuse cerebral atrophy.  Reversible causes of dementia laboratory work-up was unrevealing.    After working with physical therapy, SNF was initially recommended.  However, the patient declined this.  The patient had waxing and waning of his  mental status, requiring several doses of IM antipsychotics due to agitation.  On day prior to discharge, the patient became very agitated and belligerent.  Psychiatry was asked to evaluate the patient.  Given his refusal to cooperate with exam, their evaluation was extremely limited.  In addition, the daughter felt like the patient was likely not safe to discharge home in his current state.  On the day of discharge however, the patient was improved.  Did not require any antipsychotics leading up to discharge.  He was very pleasant, talkative, fully oriented, and cooperative with exam.  After being evaluated by psychiatry, they did feel the patient had full decision-making capacity and was safe to discharge home from a psychiatric standpoint.  Discussed with daughter via phone.  His daughter was amendable to discharging the patient home, and she did feel comfortable with the plan.  She requested that home health services referral be placed, and this was done.  At time of discharge home, the patient was ambulating around room with no assistance, and appeared to have improvement in his mobility.    From a COVID standpoint, the patient was started on Paxlovid and completed a 5-day course, though he declined several doses of the medication.  Did not require any antibiotic treatment.  He was monitored on telemetry and continuous pulse ox.  His oxygen saturations remained stable on room air, and he did not ever require any oxygen supplementation.  The patient was found to have an VETO on admission, and his lisinopril was held.  He had resolution of his VETO with IV fluids.  The patient remained normotensive without the lisinopril, and the recommendation is that continue being held at discharge.  I recommend the patient follow-up with PCP within 1 week to check blood pressure.    Day of Discharge     Subjective:  No acute events overnight.  Patient sitting in bed and very talkative, pleasant this morning.  Cooperative with  physical exam.  Denies any chest pain or shortness of breath.  States he is feeling well.    Physical Exam:  Temp:  [96.6 °F (35.9 °C)-97 °F (36.1 °C)] 97 °F (36.1 °C)  Heart Rate:  [62-81] 73  Resp:  [18] 18  BP: (106-124)/(55-67) 118/65  Body mass index is 25.18 kg/m².  Physical Exam  General: Alert, no acute distress.  Cooperative with exam.  ENT: No conjunctival injection or scleral icterus. Moist mucous membranes.   Neuro: Eyes open and moving in all directions, strength normal in all extremities, no focal deficits.   Lungs: Clear to auscultation bilaterally. No wheeze or crackles. No distress.   Heart: RRR, no murmurs. No edema.  Abdomen: Soft, non-tender, non-distended. Normal bowel sounds. No hepatosplenomegaly.   Ext: Warm and well-perfused. No edema.   Skin: No rashes or lesions. IV site without swelling or erythema.     Consultants     Consult Orders (all) (From admission, onward)       Start     Ordered    08/31/23 1244  Inpatient Case Management  Consult  Once        Provider:  (Not yet assigned)    08/31/23 1244    08/30/23 1516  Inpatient Psychiatrist Consult  Once        Specialty:  Psychiatry  Provider:  Woodrow Finnegan III, MD    08/30/23 1516    08/30/23 1313  Inpatient Access Center Consult  Once        Provider:  (Not yet assigned)    08/30/23 1312    08/29/23 1545  Inpatient Psychiatrist Consult  Once        Specialty:  Psychiatry  Provider:  Woodrow Finnegan III, MD    08/29/23 1544    08/27/23 1237  Inpatient Neurology Consult General  Once        Specialty:  Neurology  Provider:  Rhonda Adams MD    08/27/23 1239    08/26/23 0252  Inpatient Nutrition Consult  Once        Provider:  (Not yet assigned)    08/26/23 0251    08/26/23 0203  Inpatient Case Management  Consult  Once        Provider:  (Not yet assigned)    08/26/23 0203    08/25/23 2244  [facility] (on-call MD unless specified) Details  Once        Specialty:  Hospitalist  Provider:   (Not yet assigned)    08/25/23 2243                  Procedures     * Surgery not found *    Imaging Results (All)       Procedure Component Value Units Date/Time    CT Head Without Contrast [[id number]] Collected: 08/28/23 1317     Updated: 08/28/23 1639    Narrative:      CT SCAN OF THE HEAD WITHOUT CONTRAST 08/28/2023     CLINICAL HISTORY: Patient demonstrated some sundowning with intermittent  agitation, is COVID-positive.      TECHNIQUE: Spiral CT images were obtained from the base of the skull to  the vertex without intravenous contrast. The images were reformatted and  submitted in 3 mm thick axial, sagittal and coronal CT sections with  brain algorithm.      There are no prior studies from [facility] for  comparison.     FINDINGS: There is some patchy low density extending from the  periventricular into the subcortical white matter of the cerebral  hemispheres consistent with mild-to-moderate small vessel disease. The  remainder of the brain parenchyma is normal in attenuation. There is  diffuse cerebral atrophy.  The lateral and third ventricles are  prominent in size, felt to be due to central volume loss or atrophy. I  see no focal mass effect and no midline shift. No extra-axial fluid  collections are identified. There is no evidence of acute intracranial  hemorrhage. There is minimal bilateral anterior ethmoid sinus mucosal  thickening. The remainder of the paranasal sinuses and the mastoid air  cells and the middle ear cavity are clear. The calvarium and skull base  are normal in appearance. There are bilateral intraocular lens implants  in the globes from previous cataract surgery, otherwise the orbits are  unremarkable.       Impression:      1. No acute intracranial abnormality is identified.   2. There is mild-to-moderate small vessel disease in the cerebral white  matter and there is diffuse cerebral atrophy and the lateral and third  ventricles are prominent in size that is most  probably on the basis of  central volume loss or atrophy. Bilateral intraocular lens implants are  present from previous cataract surgery. The remainder of the head CT is  within normal limits.     Radiation dose reduction techniques were utilized, including automated  exposure control and exposure modulation based on body size.        This report was finalized on 8/28/2023 4:36 PM by Dr. John Guerra M.D.       XR Chest 1 View [[id number]] Collected: 08/25/23 2240     Updated: 08/25/23 2244    Narrative:      Portable chest radiograph     HISTORY: Cough, fever     TECHNIQUE: Single AP portable radiograph of the chest     COMPARISON: None       Impression:      FINDINGS AND IMPRESSION:  Bibasilar pulmonary pacification is present, left greater than right,  suggestive of atelectasis, pneumonia and/or edema in the appropriate  clinical context and correlation with patient history is recommended  with follow-up chest CT if clinically indicated. No pneumothorax is  seen. Suggestion of a large hiatal hernia. Cardiac silhouette is at the  upper limits of normal borderline enlarged.     This report was finalized on 8/25/2023 10:41 PM by Dr. Keron Briceño M.D.                 Pertinent Labs     Results from last 7 days   Lab Units 08/31/23  0508 08/28/23  0445 08/27/23  1241 08/26/23  0331   WBC 10*3/mm3 6.95 4.42 4.45 6.46   HEMOGLOBIN g/dL 10.3* 11.1* 12.1* 11.5*   PLATELETS 10*3/mm3 241 220 239 206     Results from last 7 days   Lab Units 08/31/23  0508 08/28/23  0445 08/27/23  1406 08/27/23  1241   SODIUM mmol/L 143 144 143 144   POTASSIUM mmol/L 3.9 3.9 4.1 3.8   CHLORIDE mmol/L 105 110* 111* 110*   CO2 mmol/L 30.3* 24.4 20.0* 25.0   BUN mg/dL 17 16 15 16   CREATININE mg/dL 1.07 1.10 1.16 1.13   GLUCOSE mg/dL 107* 79 102* 94   EGFR mL/min/1.73 68.0 65.8 61.7 63.7     Results from last 7 days   Lab Units 08/31/23  0508 08/27/23  1241 08/25/23  2113   ALBUMIN g/dL 3.3* 3.9 4.2   BILIRUBIN mg/dL 0.3 0.3 0.3   ALK PHOS  U/L 49 57 68   AST (SGOT) U/L 34 86* 113*   ALT (SGPT) U/L 30 41 42*     Results from last 7 days   Lab Units 08/31/23  0508 08/28/23  0445 08/27/23  1406 08/27/23  1241 08/26/23  0331 08/25/23  2113   CALCIUM mg/dL 9.0 8.1* 8.2* 8.4*   < > 8.7   ALBUMIN g/dL 3.3*  --   --  3.9  --  4.2   MAGNESIUM mg/dL 1.9  --   --   --   --   --    PHOSPHORUS mg/dL 3.1  --   --   --   --   --     < > = values in this interval not displayed.       Results from last 7 days   Lab Units 08/27/23  1241   D DIMER QUANT MCGFEU/mL 1.34*           Invalid input(s): LDLCALC  Results from last 7 days   Lab Units 08/25/23  2125 08/25/23  2113   BLOODCX  No growth at 5 days No growth at 5 days     Results from last 7 days   Lab Units 08/25/23  2108   COVID19  Detected*       Test Results Pending at Discharge   No pending tests at time of discharge.    Discharge Details        Discharge Medications        New Medications        Instructions Start Date   DULoxetine 60 MG capsule  Commonly known as: CYMBALTA   60 mg, Oral, Daily             Stop These Medications      lisinopril 20 MG tablet  Commonly known as: PRINIVIL,ZESTRIL              No Known Allergies    Discharge Disposition:  Home or Self Care      Discharge Diet:  Diet Order   Procedures    Diet: Regular/House Diet; Texture: Regular Texture (IDDSI 7); Fluid Consistency: Thin (IDDSI 0)       Discharge Activity: Activity as tolerated      CODE STATUS:    Code Status and Medical Interventions:   Ordered at: 08/26/23 0104     Code Status (Patient has no pulse and is not breathing):    CPR (Attempt to Resuscitate)     Medical Interventions (Patient has pulse or is breathing):    Full Support       Future Appointments   Date Time Provider Department Center   9/12/2023  3:00 PM Jorge Matthew APRN [facility]   11/2/2023  1:00 PM Lico Menjivar II, MD [facility]      Follow-up Information       Ernie Panda MD Follow up in 1 week(s).    Specialty: Internal  Medicine  Contact information:  [address] [facility] [address]  [phone]                             Time Spent on Discharge:  Greater than 30 minutes      Margaret Christina MD  [facility]  08/31/23  12:46 EDT

## 2023-09-01 ENCOUNTER — TRANSITIONAL CARE MANAGEMENT TELEPHONE ENCOUNTER (OUTPATIENT)
Dept: CALL CENTER | Facility: HOSPITAL | Age: 83
End: 2023-09-01
Payer: MEDICARE

## 2023-09-01 ENCOUNTER — TELEPHONE (OUTPATIENT)
Dept: FAMILY MEDICINE CLINIC | Facility: CLINIC | Age: 83
End: 2023-09-01
Payer: MEDICARE

## 2023-09-01 NOTE — OUTREACH NOTE
Call Center TCM Note      Flowsheet Row Responses   [facility] patient discharged from? [place]   Does the patient have one of the following disease processes/diagnoses(primary or secondary)? Other   TCM attempt successful? Yes   Call start time 1034   Call end time 1042   Discharge diagnosis COVID-19-Mild cognitive impairment-Major depression   Person spoke with today (if not patient) and relationship daughter CHERELLE Meli   Meds reviewed with patient/caregiver? Yes   Is the patient having any side effects they believe may be caused by any medication additions or changes? No   Does the patient have all medications ordered at discharge? Yes   Is the patient taking all medications as directed (includes completed medication regime)? Yes   Comments Daughter is in contact with PCP concerning appts and questions.   Does the patient have an appointment with their PCP within 7-14 days of discharge? No   What is the Home health agency?  [facility]   Has home health visited the patient within 72 hours of discharge? Call prior to 72 hours   Home health comments [facility] reports HH has made contact to set up visits.   Psychosocial issues? No   What is the patient's perception of their health status since discharge? Improving  [Daughter reports she has not spoke with patient today, but when she dropped him off at home last night he was doing well. No needs at this time. ]   Is the patient/caregiver able to teach back signs and symptoms related to disease process for when to call PCP? Yes   Is the patient/caregiver able to teach back signs and symptoms related to disease process for when to call 911? Yes   Is the patient/caregiver able to teach back the hierarchy of who to call/visit for symptoms/problems? PCP, Specialist, Home health nurse, Urgent Care, ED, 911 Yes   If the patient is a current smoker, are they able to teach back resources for cessation? Not a smoker   TCM call completed? Yes   Wrap up additional  comments Quick call. Daughter reports no needs at this time.   Call end time 1042   Would this patient benefit from a Referral to [facility] Social Work? No   Is the patient interested in additional calls from an ambulatory ? No            Navjot Chavez RN    9/1/2023, 10:42 EDT

## 2023-09-01 NOTE — TELEPHONE ENCOUNTER
Caller: TOBY (OR SULTANA IF AFTER 430)    Relationship: Home Health    Best call back number: [phone]     What was the call regarding: CALLING TO LET DR MON[place]NA KNOW THE SITE NURSE CAN NOT SEE THE PATIENT UNTIL THE WEEK OF 9/11. PLEASE ADVISE IF THIS IS OKAY.     Advised this should be ok. He has an appointment with the Nurse practitioner on the 12th

## 2023-09-07 ENCOUNTER — OFFICE VISIT (OUTPATIENT)
Dept: FAMILY MEDICINE CLINIC | Facility: CLINIC | Age: 83
End: 2023-09-07
Payer: MEDICARE

## 2023-09-07 VITALS
OXYGEN SATURATION: 97 % | WEIGHT: 172 LBS | SYSTOLIC BLOOD PRESSURE: 130 MMHG | DIASTOLIC BLOOD PRESSURE: 80 MMHG | BODY MASS INDEX: 24.08 KG/M2 | HEART RATE: 82 BPM | HEIGHT: 71 IN

## 2023-09-07 DIAGNOSIS — F32.A DEPRESSION, UNSPECIFIED DEPRESSION TYPE: ICD-10-CM

## 2023-09-07 DIAGNOSIS — U07.1 COVID-19: ICD-10-CM

## 2023-09-07 DIAGNOSIS — I10 BENIGN ESSENTIAL HYPERTENSION: ICD-10-CM

## 2023-09-07 DIAGNOSIS — N17.9 AKI (ACUTE KIDNEY INJURY): Primary | ICD-10-CM

## 2023-09-07 RX ORDER — LISINOPRIL 20 MG/1
20 TABLET ORAL DAILY
Qty: 90 TABLET | Refills: 1 | Status: SHIPPED | OUTPATIENT
Start: 2023-09-07

## 2023-09-07 NOTE — PROGRESS NOTES
Enter Query Response Below      Query Response: Unable to determine              If applicable, please update the problem list.       Patient: Jewel Best        : 1940  Account: [id number]           Admit Date: 2023        How to Respond to this query:       a. Click New Note     b. Answer query within the yellow box.                c. Update the Problem List, if applicable.      If you have any questions about this query contact me at: Sincerely,    Juan Santoyo RN, BSN  Clinical Documentation Integrity  [facility]  [email or web address]       ,     83 year old male was found to have Covid 19 infection with pneumonia, with VETO noted in the record.  No baseline creatinine is mentioned.  Creatinine levels in the stay were:    - 1.50  - 1.36  -1.13 and 1.16  - 1.10  - 1.07    Patient was given intravenous fluids at 125 cc/hr, labs monitored.     After study, was acute kidney injury (VETO) clinically supported during this admission?    Acute kidney injury (VETO) was supported with additional clinical indicators:____________  Acute kidney injury (VETO) was not supported  Other- specify_____________  Unable to determine     VETO is defined by KDIGO as any of the following:  Increase in creatinine > 0.3 mg/dl within 48 hours or less; or   Increase in creatinine level to > 1.5x baseline (historical or measure), which is known or presumed to have occurred within the prior 7 days   Urine volume <0.5 ml/kg/h for 6 hours.  Reference article: http://www.kdigo.org/clinical_practice_guidelines/pdf/KDIGO%20AKI%20Guideline.pdf (as published in [employer] Supplements, The Official Journal of the International Society of Nephrology, vol. 2, issue 1, 2012.)      By submitting this query, we are merely seeking further clarification of documentation to accurately reflect all conditions that you are monitoring, evaluating, treating or that extend the hospitalization  or utilize additional resources of care. Please utilize your independent clinical judgment when addressing the question(s) above.     This query and your response, once completed, will be entered into the legal medical record.    Sincerely,  Juan Santoyo  Clinical Documentation Integrity Program

## 2023-09-07 NOTE — PROGRESS NOTES
Subjective   Jewel Best is a 83 y.o. male.     History of Present Illness     {Common H&P Review Areas:84453}    Review of Systems    Objective   Physical Exam      Assessment & Plan   {Assess/PlanSmartLinks:67395}

## 2023-09-07 NOTE — PROGRESS NOTES
Transitional Care Follow Up Visit  Subjective     Jewel Best is a 83 y.o. male who presents for a transitional care management visit.    Within 48 business hours after discharge our office contacted him via telephone to coordinate his care and needs.      I reviewed and discussed the details of that call along with the discharge summary, hospital problems, inpatient lab results, inpatient diagnostic studies, and consultation reports with Jewel.     Current outpatient and discharge medications have been reconciled for the patient.  Reviewed by: Ernie Panda MD          8/31/2023     6:15 PM   Date of TCM Phone Call   [facility]   Date of Admission 8/25/2023   Date of Discharge 8/31/2023   Discharge Disposition Home or Self Care     Risk for Readmission (LACE) Score: 9 (8/31/2023  6:00 AM)      History of Present Illness   Course During Hospital Stay: Jewel is here today for hospital follow-up.  He was admitted to the hospital on 25 August and discharged on the 31st.  He was admitted after being found at home confused with some stool incontinence.  In the emergency room he was found to have some acute kidney injury was admitted for further care and treatment.  He was also found to have COVID.  His chest x-ray showed some opacities consistent with possible pneumonia or atelectasis.  Because of some confusion he had a CT scan of the head.  That did show atrophic changes including cerebellar atrophy and enlarged ventricles.  There was changes consistent with small vessel disease.  He was evaluated by neurology and felt to have some mild dementia.  Because of some agitation and mental status changes apparently he required restraint.  The patient denies having been agitated or belligerent.  I advised the patient that I was not there therefore I cannot  either side.  However I did explain that when people are sick and out of their familiar surroundings that confusion can occur.  With  hydration his BUN and creatinine returned to normal.  His breathing returned to baseline.  He was able to be discharged.  He has been struggling with depression.  We were able to get him an appointment with a psychiatry nurse practitioner but he sounds reluctant to complete that appointment.  I did encourage him that he should go ahead and go.  When he was discharged from the hospital he was not on the lisinopril because of some low blood pressures.  My blood pressure here today is 146/80 and I am going to have him resume the lisinopril.     The following portions of the patient's history were reviewed and updated as appropriate: allergies, current medications, and problem list.    Review of Systems    Objective   Physical Exam  Vitals and nursing note reviewed.   Cardiovascular:      Rate and Rhythm: Normal rate and regular rhythm.   Pulmonary:      Effort: Pulmonary effort is normal.      Breath sounds: No wheezing, rhonchi or rales.   Neurological:      General: No focal deficit present.      Mental Status: He is alert.      Cranial Nerves: No cranial nerve deficit.   Psychiatric:         Mood and Affect: Mood normal.         Behavior: Behavior normal.     Assessment & Plan   Diagnoses and all orders for this visit:    1. VETO (acute kidney injury) (Primary)    2. COVID-19    3. Benign essential hypertension    4. Depression, unspecified depression type    Other orders  -     lisinopril (PRINIVIL,ZESTRIL) 20 MG tablet; Take 1 tablet by mouth Daily.  Dispense: 90 tablet; Refill: 1    Jewel is here today for follow-up.  His acute kidney injury seems to have resolved and clinically his lungs are clear and his oxygen saturation looks good.  We did resume his blood pressure medicine.  I did encourage him to see the psychiatrist.  I advised him to follow-up in 3 months to recheck his blood pressure.

## 2023-09-07 NOTE — PROGRESS NOTES
"Enter Query Response Below      Query Response: Metabolic encephalopathy due to COVID 19 infection ruled out             If applicable, please update the problem list.       Patient: Jewel Best        : 1940  Account: [id number]           Admit Date: 2023        How to Respond to this query:       a. Click New Note     b. Answer query within the yellow box.                c. Update the Problem List, if applicable.      If you have any questions about this query contact me at: Sincerely,    Juan Santoyo RN, BSN  Clinical Documentation Integrity  [facility]  [email or web address]       ,     On 2023, 83 year old male presented with fever, cough and was found to have Covid 19 infection with pneumonia.  GCS was 15 on admission.     -- Neurology consult for increased agitation, uncooperative with treatments, cognitive decline with significant sundowning observed per family at home. Likely Alzheimer's dementia was noted, along with metabolic encephalopathy r/t Covid 19 infection, unable to make decisions regarding care with \" Correct metabolic causes and mentation should improve\" noted.     -- Nursing notes increased  \"belligerent, called 911 from room, security called to bring patient back to room.\" GCS 14.    -Treatments- Paxlovid, intravenous fluids, psych consult, cognitive evaluation as outpatient.     -Discharge summary- Reports improved mentation, fully oriented with full decision making capabilities.  GCS 15.     Please clarify the following:    Metabolic encephalopathy due to Covid 19 infection ruled in  Metabolic encephalopathy due to Covid 19 infection  ruled out  Other- specify______  Unable to determine      By submitting this query, we are merely seeking further clarification of documentation to accurately reflect all conditions that you are monitoring, evaluating, treating or that extend the hospitalization or utilize additional resources of care. Please " utilize your independent clinical judgment when addressing the question(s) above.     This query and your response, once completed, will be entered into the legal medical record.    Sincerely,  Juan Santoyo  Clinical Documentation Integrity Program

## 2023-09-13 ENCOUNTER — TELEPHONE (OUTPATIENT)
Dept: FAMILY MEDICINE CLINIC | Facility: CLINIC | Age: 83
End: 2023-09-13

## 2023-09-13 NOTE — TELEPHONE ENCOUNTER
Caller: MARICEL    Relationship to patient: Home Health    Best call back number: [phone]    Patient is needing: SHE STATES PATIENT IS DECLINING TO DO HOME HEALTH. AND STATES THAT HE DOESN'T NEED IT.

## 2023-09-27 ENCOUNTER — HOSPITAL ENCOUNTER (EMERGENCY)
Facility: HOSPITAL | Age: 83
Discharge: HOME | End: 2023-09-27
Attending: STUDENT IN AN ORGANIZED HEALTH CARE EDUCATION/TRAINING PROGRAM
Payer: MEDICARE

## 2023-09-27 VITALS
DIASTOLIC BLOOD PRESSURE: 70 MMHG | HEART RATE: 70 BPM | WEIGHT: 170 LBS | BODY MASS INDEX: 24.34 KG/M2 | OXYGEN SATURATION: 99 % | RESPIRATION RATE: 16 BRPM | SYSTOLIC BLOOD PRESSURE: 131 MMHG | HEIGHT: 70 IN | TEMPERATURE: 97.4 F

## 2023-09-27 DIAGNOSIS — R45.89 DEPRESSED MOOD WITH FEELING OF LONELINESS: ICD-10-CM

## 2023-09-27 DIAGNOSIS — R63.0 POOR APPETITE: Primary | ICD-10-CM

## 2023-09-27 DIAGNOSIS — R45.2 DEPRESSED MOOD WITH FEELING OF LONELINESS: ICD-10-CM

## 2023-09-27 LAB
ALBUMIN SERPL-MCNC: 4.1 G/DL (ref 3.5–5.2)
ALBUMIN/GLOB SERPL: 1.6 G/DL
ALP SERPL-CCNC: 77 U/L (ref 39–117)
ALT SERPL W P-5'-P-CCNC: 9 U/L (ref 1–41)
AMPHET+METHAMPHET UR QL: NEGATIVE
ANION GAP SERPL CALCULATED.3IONS-SCNC: 10 MMOL/L (ref 5–15)
AST SERPL-CCNC: 18 U/L (ref 1–40)
BARBITURATES UR QL SCN: NEGATIVE
BASOPHILS # BLD AUTO: 0.05 10*3/MM3 (ref 0–0.2)
BASOPHILS NFR BLD AUTO: 0.6 % (ref 0–1.5)
BENZODIAZ UR QL SCN: NEGATIVE
BILIRUB SERPL-MCNC: 0.5 MG/DL (ref 0–1.2)
BILIRUB UR QL STRIP: NEGATIVE
BUN SERPL-MCNC: 15 MG/DL (ref 8–23)
BUN/CREAT SERPL: 13.3 (ref 7–25)
CALCIUM SPEC-SCNC: 9.3 MG/DL (ref 8.6–10.5)
CANNABINOIDS SERPL QL: NEGATIVE
CHLORIDE SERPL-SCNC: 102 MMOL/L (ref 98–107)
CLARITY UR: CLEAR
CO2 SERPL-SCNC: 27 MMOL/L (ref 22–29)
COCAINE UR QL: NEGATIVE
COLOR UR: YELLOW
CREAT SERPL-MCNC: 1.13 MG/DL (ref 0.76–1.27)
DEPRECATED RDW RBC AUTO: 42.7 FL (ref 37–54)
EGFRCR SERPLBLD CKD-EPI 2021: 63.7 ML/MIN/1.73
EOSINOPHIL # BLD AUTO: 0.33 10*3/MM3 (ref 0–0.4)
EOSINOPHIL NFR BLD AUTO: 4.3 % (ref 0.3–6.2)
ERYTHROCYTE [DISTWIDTH] IN BLOOD BY AUTOMATED COUNT: 13.5 % (ref 12.3–15.4)
ETHANOL BLD-MCNC: <10 MG/DL (ref 0–10)
ETHANOL UR QL: <0.01 %
FENTANYL UR-MCNC: NEGATIVE NG/ML
GLOBULIN UR ELPH-MCNC: 2.5 GM/DL
GLUCOSE SERPL-MCNC: 92 MG/DL (ref 65–99)
GLUCOSE UR STRIP-MCNC: NEGATIVE MG/DL
HCT VFR BLD AUTO: 36 % (ref 37.5–51)
HGB BLD-MCNC: 12 G/DL (ref 13–17.7)
HGB UR QL STRIP.AUTO: NEGATIVE
IMM GRANULOCYTES # BLD AUTO: 0.03 10*3/MM3 (ref 0–0.05)
IMM GRANULOCYTES NFR BLD AUTO: 0.4 % (ref 0–0.5)
KETONES UR QL STRIP: ABNORMAL
LEUKOCYTE ESTERASE UR QL STRIP.AUTO: NEGATIVE
LYMPHOCYTES # BLD AUTO: 2.27 10*3/MM3 (ref 0.7–3.1)
LYMPHOCYTES NFR BLD AUTO: 29.4 % (ref 19.6–45.3)
MCH RBC QN AUTO: 29.1 PG (ref 26.6–33)
MCHC RBC AUTO-ENTMCNC: 33.3 G/DL (ref 31.5–35.7)
MCV RBC AUTO: 87.2 FL (ref 79–97)
METHADONE UR QL SCN: NEGATIVE
MONOCYTES # BLD AUTO: 0.46 10*3/MM3 (ref 0.1–0.9)
MONOCYTES NFR BLD AUTO: 6 % (ref 5–12)
NEUTROPHILS NFR BLD AUTO: 4.59 10*3/MM3 (ref 1.7–7)
NEUTROPHILS NFR BLD AUTO: 59.3 % (ref 42.7–76)
NITRITE UR QL STRIP: NEGATIVE
NRBC BLD AUTO-RTO: 0 /100 WBC (ref 0–0.2)
OPIATES UR QL: NEGATIVE
OXYCODONE UR QL SCN: NEGATIVE
PH UR STRIP.AUTO: 7 [PH] (ref 5–8)
PLATELET # BLD AUTO: 262 10*3/MM3 (ref 140–450)
PMV BLD AUTO: 10 FL (ref 6–12)
POTASSIUM SERPL-SCNC: 4.9 MMOL/L (ref 3.5–5.2)
PROT SERPL-MCNC: 6.6 G/DL (ref 6–8.5)
PROT UR QL STRIP: NEGATIVE
RBC # BLD AUTO: 4.13 10*6/MM3 (ref 4.14–5.8)
SODIUM SERPL-SCNC: 139 MMOL/L (ref 136–145)
SP GR UR STRIP: 1.02 (ref 1–1.03)
UROBILINOGEN UR QL STRIP: ABNORMAL
WBC NRBC COR # BLD: 7.73 10*3/MM3 (ref 3.4–10.8)

## 2023-09-27 PROCEDURE — 80307 DRUG TEST PRSMV CHEM ANLYZR: CPT | Performed by: PHYSICIAN ASSISTANT

## 2023-09-27 PROCEDURE — 82077 ASSAY SPEC XCP UR&BREATH IA: CPT | Performed by: PHYSICIAN ASSISTANT

## 2023-09-27 PROCEDURE — 85025 COMPLETE CBC W/AUTO DIFF WBC: CPT | Performed by: PHYSICIAN ASSISTANT

## 2023-09-27 PROCEDURE — 90791 PSYCH DIAGNOSTIC EVALUATION: CPT

## 2023-09-27 PROCEDURE — 81003 URINALYSIS AUTO W/O SCOPE: CPT | Performed by: PHYSICIAN ASSISTANT

## 2023-09-27 PROCEDURE — 99283 EMERGENCY DEPT VISIT LOW MDM: CPT

## 2023-09-27 PROCEDURE — 80053 COMPREHEN METABOLIC PANEL: CPT | Performed by: PHYSICIAN ASSISTANT

## 2023-09-27 RX ORDER — SODIUM CHLORIDE 0.9 % (FLUSH) 0.9 %
10 SYRINGE (ML) INJECTION AS NEEDED
Status: DISCONTINUED | OUTPATIENT
Start: 2023-09-27 | End: 2023-09-27 | Stop reason: HOSPADM

## 2023-09-27 RX ADMIN — SODIUM CHLORIDE 1000 ML: 9 INJECTION, SOLUTION INTRAVENOUS at 17:16

## 2023-09-27 NOTE — ED NOTES
"Pt to ED from home, called EMS for increase in depression, decreased appetite and feeling apathetic. Pt denies suicidal ideations but has been having a lot of thoughts of dying and states. \"I am scared of dying\"  "

## 2023-09-27 NOTE — ED PROVIDER NOTES
EMERGENCY DEPARTMENT ENCOUNTER    Room Number:  05/05  Date of encounter:  9/27/2023  PCP: Ernie Panda MD  Historian: Patient  Chronic or social conditions impacting care (social determinants of health): Full code from home    HPI:  Chief Complaint: Anxiety, poor appetite  A complete HPI/ROS/PMH/PSH/SH/FH are unobtainable due to: Nothing    Context: Jewel Best is a 83 y.o. male who presents to the ED c/o poor appetite, failure to thrive for the past several days.  Patient's is concerned that he may be dehydrated.  Prior to arrival he became concerned that he was going to die.  He cannot elaborate on the symptoms.  He denies any chest pain, shortness of breath, vomiting, headache.  He states he became very anxious and thought he might die.  At this time he states the symptoms have resolved.  He does live by himself and states that he gets lonely and anxious.  He does have a history of anxiety.  He denies any drinking or illicit drug usage.    Review of prior external notes (non-ED):   I reviewed admission from 8/25/2023.  Patient seen for VETO, CoVid    Review of prior external test results outside of this encounter:  I reviewed a CMP from 8/31/2023.  Creatinine 1.07, potassium 3.9    PAST MEDICAL HISTORY  Active Ambulatory Problems     Diagnosis Date Noted    Anemia 04/25/2016    Anxiety disorder 04/25/2016    Benign essential hypertension 04/25/2016    Eczema 04/25/2016    Hyperlipidemia 04/25/2016    Fistula of skin 06/29/2016    Lymphoma in remission 09/01/2016    Body mass index (BMI) 30.0-30.9, adult  11/15/2021    Major depression, single episode 07/24/2023    COVID-19 08/25/2023    VETO (acute kidney injury) 08/26/2023    Mild cognitive impairment 08/31/2023     Resolved Ambulatory Problems     Diagnosis Date Noted    No Resolved Ambulatory Problems     Past Medical History:   Diagnosis Date    Anxiety     Bladder cancer     Depression     Eating disorder     Hypertension     Lymphoma           PAST SURGICAL HISTORY  Past Surgical History:   Procedure Laterality Date    COLONOSCOPY      KNEE SURGERY      bakers cyst removal         FAMILY HISTORY  Family History   Family history unknown: Yes         SOCIAL HISTORY  Social History     Socioeconomic History    Marital status:    Tobacco Use    Smoking status: Former     Packs/day: 1.00     Years: 10.00     Pack years: 10.00     Types: Cigarettes     Quit date: 1971     Years since quittin.8     Passive exposure: Past    Smokeless tobacco: Never   Vaping Use    Vaping Use: Never used   Substance and Sexual Activity    Alcohol use: No    Drug use: No    Sexual activity: Not Currently     Partners: Male         ALLERGIES  Patient has no known allergies.        REVIEW OF SYSTEMS  All systems reviewed and negative except for those discussed in HPI.       PHYSICAL EXAM    I have reviewed the triage vital signs and nursing notes.    ED Triage Vitals [23 1622]   Temp Heart Rate Resp BP SpO2   97.4 °F (36.3 °C) 80 16 126/70 96 %      Temp src Heart Rate Source Patient Position BP Location FiO2 (%)   Oral Monitor Sitting Right arm --       Physical Exam  GENERAL: Alert, oriented, not distressed  HENT: head atraumatic, no nuchal rigidity  EYES: no scleral icterus, EOMI  CV: regular rhythm, regular rate, no murmur  RESPIRATORY: normal effort, CTA  ABDOMEN: soft, nontender  MUSCULOSKELETAL: no deformity, FROM, no calf swelling or tenderness  NEURO: alert, moves all extremities, follows commands  SKIN: warm, dry        LAB RESULTS  Recent Results (from the past 24 hour(s))   Comprehensive Metabolic Panel    Collection Time: 23  5:14 PM    Specimen: Blood   Result Value Ref Range    Glucose 92 65 - 99 mg/dL    BUN 15 8 - 23 mg/dL    Creatinine 1.13 0.76 - 1.27 mg/dL    Sodium 139 136 - 145 mmol/L    Potassium 4.9 3.5 - 5.2 mmol/L    Chloride 102 98 - 107 mmol/L    CO2 27.0 22.0 - 29.0 mmol/L    Calcium 9.3 8.6 - 10.5 mg/dL    Total  Protein 6.6 6.0 - 8.5 g/dL    Albumin 4.1 3.5 - 5.2 g/dL    ALT (SGPT) 9 1 - 41 U/L    AST (SGOT) 18 1 - 40 U/L    Alkaline Phosphatase 77 39 - 117 U/L    Total Bilirubin 0.5 0.0 - 1.2 mg/dL    Globulin 2.5 gm/dL    A/G Ratio 1.6 g/dL    BUN/Creatinine Ratio 13.3 7.0 - 25.0    Anion Gap 10.0 5.0 - 15.0 mmol/L    eGFR 63.7 >60.0 mL/min/1.73   CBC Auto Differential    Collection Time: 09/27/23  5:14 PM    Specimen: Blood   Result Value Ref Range    WBC 7.73 3.40 - 10.80 10*3/mm3    RBC 4.13 (L) 4.14 - 5.80 10*6/mm3    Hemoglobin 12.0 (L) 13.0 - 17.7 g/dL    Hematocrit 36.0 (L) 37.5 - 51.0 %    MCV 87.2 79.0 - 97.0 fL    MCH 29.1 26.6 - 33.0 pg    MCHC 33.3 31.5 - 35.7 g/dL    RDW 13.5 12.3 - 15.4 %    RDW-SD 42.7 37.0 - 54.0 fl    MPV 10.0 6.0 - 12.0 fL    Platelets 262 140 - 450 10*3/mm3    Neutrophil % 59.3 42.7 - 76.0 %    Lymphocyte % 29.4 19.6 - 45.3 %    Monocyte % 6.0 5.0 - 12.0 %    Eosinophil % 4.3 0.3 - 6.2 %    Basophil % 0.6 0.0 - 1.5 %    Immature Grans % 0.4 0.0 - 0.5 %    Neutrophils, Absolute 4.59 1.70 - 7.00 10*3/mm3    Lymphocytes, Absolute 2.27 0.70 - 3.10 10*3/mm3    Monocytes, Absolute 0.46 0.10 - 0.90 10*3/mm3    Eosinophils, Absolute 0.33 0.00 - 0.40 10*3/mm3    Basophils, Absolute 0.05 0.00 - 0.20 10*3/mm3    Immature Grans, Absolute 0.03 0.00 - 0.05 10*3/mm3    nRBC 0.0 0.0 - 0.2 /100 WBC   Ethanol    Collection Time: 09/27/23  5:14 PM    Specimen: Blood   Result Value Ref Range    Ethanol <10 0 - 10 mg/dL    Ethanol % <0.010 %   Urinalysis With Microscopic If Indicated (No Culture) - Urine, Clean Catch    Collection Time: 09/27/23  5:40 PM    Specimen: Urine, Clean Catch   Result Value Ref Range    Color, UA Yellow Yellow, Straw    Appearance, UA Clear Clear    pH, UA 7.0 5.0 - 8.0    Specific Gravity, UA 1.016 1.005 - 1.030    Glucose, UA Negative Negative    Ketones, UA 15 mg/dL (1+) (A) Negative    Bilirubin, UA Negative Negative    Blood, UA Negative Negative    Protein, UA Negative  Negative    Leuk Esterase, UA Negative Negative    Nitrite, UA Negative Negative    Urobilinogen, UA 1.0 E.U./dL 0.2 - 1.0 E.U./dL   Urine Drug Screen - Urine, Clean Catch    Collection Time: 09/27/23  5:40 PM    Specimen: Urine, Clean Catch   Result Value Ref Range    Amphet/Methamphet, Screen Negative Negative    Barbiturates Screen, Urine Negative Negative    Benzodiazepine Screen, Urine Negative Negative    Cocaine Screen, Urine Negative Negative    Opiate Screen Negative Negative    THC, Screen, Urine Negative Negative    Methadone Screen, Urine Negative Negative    Oxycodone Screen, Urine Negative Negative    Fentanyl, Urine Negative Negative       Ordered the above labs and independently reviewed the results.      MEDICATIONS GIVEN IN ER    Medications   sodium chloride 0.9 % flush 10 mL (has no administration in time range)   sodium chloride 0.9 % bolus 1,000 mL (0 mL Intravenous Stopped 9/27/23 2041)         ADDITIONAL ORDERS CONSIDERED BUT NOT ORDERED:  Nothing      PROGRESS, DATA ANALYSIS, CONSULTS, AND MEDICAL DECISION MAKING    All labs have been independently interpreted by myself.  All radiology studies have been independently interpreted by myself and discussed with radiologist dictating the report.   EKG's independently interpreted by myself.  Discussion below represents my analysis of pertinent findings related to patient's condition, differential diagnosis, treatment plan and final disposition.    I have discussed case with Dr. Fitzpatrick, emergency room physician.  He has performed his own bedside examination and agrees with treatment plan.    ED Course as of 09/27/23 2113   Wed Sep 27, 2023   1710 Patient presents from home with a now resolved episode of anxiety and fear of death.  He is denying any suicidal ideation.  Patient concerned that he is dehydrated.  We will check basic labs and have [facility] evaluate the patient. [EE]   1739 WBC: 7.73 [EE]   1739 Hemoglobin(!): 12.0 [EE]   1806 Ethanol: <10  [EE]   1806 Sodium: 139 [EE]   1806 Potassium: 4.9 [EE]   1817 Ethanol: <10 [EE]   1817 Cocaine Screen, Urine: Negative [EE]   2023 Access has seen and evaluated the patient.  The patient declines any resources.  He states he is not depressed.  He reports that he is lonely.  He has been encouraged to follow-up with his primary care doctor and family.  No underlying metabolic issues.  We will discharge. [EE]      ED Course User Index  [EE] Nicolás Weller PA       AS OF 21:13 EDT VITALS:    BP - 131/70  HR - 70  TEMP - 97.4 °F (36.3 °C) (Oral)  O2 SATS - 99%        DIAGNOSIS  Final diagnoses:   Poor appetite   Depressed mood with feeling of loneliness         DISPOSITION  Discharged      Dictated utilizing Dragon dictation     Nicolás Weller PA  09/27/23 2114

## 2023-09-27 NOTE — ED PROVIDER NOTES
MD ATTESTATION NOTE    The NAINA and I have discussed this patient's history, physical exam, and treatment plan.  I have reviewed the documentation and personally had a face to face interaction with the patient. I affirm the documentation and agree with the treatment and plan.  The attached note describes my personal findings.      I provided a substantive portion of the care of the patient.  I personally performed the physical exam in its entirety, and below are my findings.        Brief HPI: 83-year-old male presenting for evaluation of thrive and poor appetite.  Patient is concerned for dehydration and stated that he was worried he may die at home.    PHYSICAL EXAM  ED Triage Vitals [09/27/23 1622]   Temp Heart Rate Resp BP SpO2   97.4 °F (36.3 °C) 80 16 126/70 96 %      Temp src Heart Rate Source Patient Position BP Location FiO2 (%)   Oral Monitor Sitting Right arm --         GENERAL: no acute distress  HENT: nares patent  EYES: no scleral icterus  CV: regular rhythm, normal rate  RESPIRATORY: normal effort  ABDOMEN: soft  MUSCULOSKELETAL: no deformity  NEURO: alert, moves all extremities, follows commands  PSYCH:  calm, cooperative  SKIN: warm, dry    Vital signs and nursing notes reviewed.        Plan: 83-year-old male presenting for evaluation of failure to thrive.  Laboratory evaluation is overall unremarkable and patient is overall well-appearing.  Suspect anxiety large component in patient's presentation.  Psychiatric services consulted and after evaluating patient patient has declined any additional resources.  Patient does not have any indication of acute psychiatric emergency.  Patient will be discharged to follow-up on outpatient basis.       Antonio Fitzpatrick MD  09/27/23 6206

## 2023-09-28 NOTE — CONSULTS
"Patient to ED from home; called EMS due to sleeping 14 hours, decreased appetite.     Patient appears alert and oriented times 4. Well kempt. Introduced myself and role in his care and patient s/w guarded stating \"so you think it's in my head\". Patient then asked who would be able to see the information that I gathered; explained that it would be need to know only. Patient states he regrets coming here and should have made an appointment with his PCP. Offered support and active listening. Patient states \"I just want to go home\".     Patient adamantly denies SI stating \"I want to live\". States he was concerned something was wrong due to excessive sleeping so came to the hospital. Patient was seen by Dr. Finnegan while hospitalized last month for COVID-19 and diagnosis listed was major depressive disorder by history as patient did not allow him to perform a mental status examination or further explore any depressive issues.     Patient states he has been  for a long time and feels lonely for female companionship. States he has children but they stay busy. Patient states he was walking quite a bit but became sick last month. Spoke with patient re his feelings of loneliness and ways to be around people more. Patient talked about possibly going to [employer] for the social interaction and starting to walk daily again for exercise. Acknowledged that exercise is a natural mood elevator.     Asked patient if he would consider taking referrals for an outpatient therapist, counselor or psychiatrist and he became mildly irritable and declined. Unfortunately, evaluation is limited due to patient's refusal to cooperative with questions. Patient does consistently deny SI and verbalizes no hx of self harm.     Spoke with PAIGE Brandon plan of care. Patient requesting discharge and declining referrals. Appears alert and oriented times 4. States he will call an uber to take him home.   "

## 2023-09-29 ENCOUNTER — PATIENT OUTREACH (OUTPATIENT)
Dept: CASE MANAGEMENT | Facility: OTHER | Age: 83
End: 2023-09-29
Payer: MEDICARE

## 2023-09-29 NOTE — OUTREACH NOTE
Patient Outreach    AMBULATORY CASE MANAGEMENT NOTE    Name and Relationship of Patient/Support Person: Jewel Best W - Self    Call placed to patient to follow up recent ED visit. Introduced self and role of ACM.Mr elisa states he is doing well. He is sleeping much better. He has not followed up with his PCP but will do so. Reviewed ED visit and expression of loneliness. Discussed volunteering and he is very interested. Offered to send him some information with opportunities in [place] and he would like this. He does still drive. Information sent via email as requested. Discussed engagement in program and he would like this. His email address in [email or web address].    Adult Patient Profile  Questions/Answers      Flowsheet Row Most Recent Value   Symptoms/Conditions Managed at Home behavioral health, cardiovascular   Barriers to Managing Health social support   Cardiovascular Symptoms/Conditions high blood cholesterol   Cardiovascular Management Strategies medication therapy   Behavioral Health Symptoms/Conditions anxiety, depression   Behavioral Management Strategies medication therapy   Advance Directive Status Patient has advance directive, copy in chart   Primary Source of Support/Comfort child(tip)   People in Home alone   Family Caregiver if Needed child(tip), adult            Education Documentation  Self-Care, taught by Renee Patel, RN at 9/29/2023 11:36 AM.  Learner: Patient  Readiness: Eager  Method: Explanation  Response: Verbalizes Understanding    Coping Strategies, taught by Renee Patel, RN at 9/29/2023 11:36 AM.  Learner: Patient  Readiness: Eager  Method: Explanation  Response: Verbalizes Understanding          Renee WILCOX  Ambulatory Case Management    9/29/2023, 12:07 EDT

## 2023-10-18 ENCOUNTER — OFFICE VISIT (OUTPATIENT)
Dept: FAMILY MEDICINE CLINIC | Facility: CLINIC | Age: 83
End: 2023-10-18
Payer: MEDICARE

## 2023-10-18 VITALS
BODY MASS INDEX: 24.62 KG/M2 | HEIGHT: 70 IN | SYSTOLIC BLOOD PRESSURE: 86 MMHG | HEART RATE: 92 BPM | WEIGHT: 172 LBS | OXYGEN SATURATION: 98 % | RESPIRATION RATE: 16 BRPM | DIASTOLIC BLOOD PRESSURE: 48 MMHG

## 2023-10-18 DIAGNOSIS — R03.1 LOW BLOOD PRESSURE READING: ICD-10-CM

## 2023-10-18 DIAGNOSIS — G31.84 MILD COGNITIVE IMPAIRMENT: Primary | ICD-10-CM

## 2023-10-18 DIAGNOSIS — I10 BENIGN ESSENTIAL HYPERTENSION: ICD-10-CM

## 2023-10-18 DIAGNOSIS — E53.8 B12 DEFICIENCY: ICD-10-CM

## 2023-10-18 DIAGNOSIS — R41.89 PSEUDODEMENTIA: ICD-10-CM

## 2023-10-18 PROCEDURE — 3078F DIAST BP <80 MM HG: CPT | Performed by: INTERNAL MEDICINE

## 2023-10-18 PROCEDURE — 99214 OFFICE O/P EST MOD 30 MIN: CPT | Performed by: INTERNAL MEDICINE

## 2023-10-18 PROCEDURE — 3074F SYST BP LT 130 MM HG: CPT | Performed by: INTERNAL MEDICINE

## 2023-10-18 RX ORDER — TRAZODONE HYDROCHLORIDE 50 MG/1
50 TABLET ORAL NIGHTLY
Qty: 30 TABLET | Refills: 5 | Status: SHIPPED | OUTPATIENT
Start: 2023-10-18

## 2023-10-18 NOTE — PROGRESS NOTES
Subjective chief complaint is follow-up on blood pressure but also insomnia and memory problems  Jewel Best is a 83 y.o. male.     History of Present Illness Jewel is here today for follow-up.  His blood pressure was low to the MAs exam and I do get 82/50 on my exam.  He is not dizzy or lightheaded.  He is taking lisinopril 20 mg daily.  Previously his blood pressures were much worse than this.  He is complaining of some memory problems.  He has self diagnosed with dementia.  However he did score a 27 out of 30 on the ACE 3 mini cog.  We did review a CAT scan of his head that was done at recent admission.  He does have some cerebral atrophy and enlargement of the ventricles and sulci.  There is some periventricular white matter change.  He has had some B12 deficiency in the past and has not been getting his B12 injections here.  We will recheck that as well.  His biggest problem seems to be depression.  He does not like the way the duloxetine made him feel and therefore he stopped taking it.  He is not sleeping.  He goes to bed around midnight and then will only sleep till about 2 and then is awake.    The following portions of the patient's history were reviewed and updated as appropriate: allergies, current medications, and problem list.    Review of Systems   Neurological:  Positive for memory problem. Negative for dizziness and light-headedness.   Psychiatric/Behavioral:  Positive for decreased concentration and sleep disturbance. The patient is nervous/anxious.      Objective   Physical Exam  Vitals and nursing note reviewed.   Constitutional:       Appearance: Normal appearance.   Cardiovascular:      Rate and Rhythm: Normal rate and regular rhythm.   Pulmonary:      Effort: Pulmonary effort is normal.      Breath sounds: No wheezing, rhonchi or rales.   Neurological:      General: No focal deficit present.      Mental Status: He is alert.      Cranial Nerves: No cranial nerve deficit.      Comments: ACE  3 mini cog score was 27 out of 30.  He was only able to name 18 animals.  He was only able to recall 5 points of the address.       Assessment & Plan   Diagnoses and all orders for this visit:    1. Mild cognitive impairment (Primary)  -     TSH+Free T4  -     Folate  -     Vitamin B12  -     MRI Brain Without Contrast; Future    2. Pseudodementia  -     TSH+Free T4  -     Folate  -     Vitamin B12  -     MRI Brain Without Contrast; Future    3. Benign essential hypertension  -     CBC & Differential  -     Comprehensive Metabolic Panel  -     TSH+Free T4    4. Low blood pressure reading  -     CBC & Differential  -     Comprehensive Metabolic Panel    5. B12 deficiency  -     Folate  -     Vitamin B12    Other orders  -     traZODone (DESYREL) 50 MG tablet; Take 1 tablet by mouth Every Night.  Dispense: 30 tablet; Refill: 5    Eboni is here today for follow-up.  I suspect his memory issues are some pseudodementia.  He basically probably has some mild age-related cognitive decline.  His blood pressure is low and we are going to have him take only half of his lisinopril.  We will do some lab work in terms of blood count chemistry and thyroid.  We are going to recheck his vitamin B12 levels.  We will also get an MRI scan of the brain.

## 2023-10-19 LAB
ALBUMIN SERPL-MCNC: 4.7 G/DL (ref 3.5–5.2)
ALBUMIN/GLOB SERPL: 2 G/DL
ALP SERPL-CCNC: 70 U/L (ref 39–117)
ALT SERPL-CCNC: 14 U/L (ref 1–41)
AST SERPL-CCNC: 16 U/L (ref 1–40)
BASOPHILS # BLD AUTO: 0.05 10*3/MM3 (ref 0–0.2)
BASOPHILS NFR BLD AUTO: 0.6 % (ref 0–1.5)
BILIRUB SERPL-MCNC: 0.4 MG/DL (ref 0–1.2)
BUN SERPL-MCNC: 22 MG/DL (ref 8–23)
BUN/CREAT SERPL: 18.8 (ref 7–25)
CALCIUM SERPL-MCNC: 10.3 MG/DL (ref 8.6–10.5)
CHLORIDE SERPL-SCNC: 103 MMOL/L (ref 98–107)
CO2 SERPL-SCNC: 31.5 MMOL/L (ref 22–29)
CREAT SERPL-MCNC: 1.17 MG/DL (ref 0.76–1.27)
EGFRCR SERPLBLD CKD-EPI 2021: 61.1 ML/MIN/1.73
EOSINOPHIL # BLD AUTO: 0.32 10*3/MM3 (ref 0–0.4)
EOSINOPHIL NFR BLD AUTO: 3.9 % (ref 0.3–6.2)
ERYTHROCYTE [DISTWIDTH] IN BLOOD BY AUTOMATED COUNT: 13.8 % (ref 12.3–15.4)
FOLATE SERPL-MCNC: 6.87 NG/ML (ref 4.78–24.2)
GLOBULIN SER CALC-MCNC: 2.4 GM/DL
GLUCOSE SERPL-MCNC: 99 MG/DL (ref 65–99)
HCT VFR BLD AUTO: 39.3 % (ref 37.5–51)
HGB BLD-MCNC: 13.3 G/DL (ref 13–17.7)
IMM GRANULOCYTES # BLD AUTO: 0.03 10*3/MM3 (ref 0–0.05)
IMM GRANULOCYTES NFR BLD AUTO: 0.4 % (ref 0–0.5)
LYMPHOCYTES # BLD AUTO: 2.4 10*3/MM3 (ref 0.7–3.1)
LYMPHOCYTES NFR BLD AUTO: 29.5 % (ref 19.6–45.3)
MCH RBC QN AUTO: 29.9 PG (ref 26.6–33)
MCHC RBC AUTO-ENTMCNC: 33.8 G/DL (ref 31.5–35.7)
MCV RBC AUTO: 88.3 FL (ref 79–97)
MONOCYTES # BLD AUTO: 0.52 10*3/MM3 (ref 0.1–0.9)
MONOCYTES NFR BLD AUTO: 6.4 % (ref 5–12)
NEUTROPHILS # BLD AUTO: 4.81 10*3/MM3 (ref 1.7–7)
NEUTROPHILS NFR BLD AUTO: 59.2 % (ref 42.7–76)
NRBC BLD AUTO-RTO: 0 /100 WBC (ref 0–0.2)
PLATELET # BLD AUTO: 385 10*3/MM3 (ref 140–450)
POTASSIUM SERPL-SCNC: 5.1 MMOL/L (ref 3.5–5.2)
PROT SERPL-MCNC: 7.1 G/DL (ref 6–8.5)
RBC # BLD AUTO: 4.45 10*6/MM3 (ref 4.14–5.8)
SODIUM SERPL-SCNC: 143 MMOL/L (ref 136–145)
T4 FREE SERPL-MCNC: 0.97 NG/DL (ref 0.93–1.7)
TSH SERPL DL<=0.005 MIU/L-ACNC: 2.87 UIU/ML (ref 0.27–4.2)
VIT B12 SERPL-MCNC: 406 PG/ML (ref 211–946)
WBC # BLD AUTO: 8.13 10*3/MM3 (ref 3.4–10.8)

## 2023-11-02 ENCOUNTER — OFFICE VISIT (OUTPATIENT)
Dept: NEUROLOGY | Facility: CLINIC | Age: 83
End: 2023-11-02
Payer: MEDICARE

## 2023-11-02 VITALS
OXYGEN SATURATION: 98 % | WEIGHT: 172 LBS | SYSTOLIC BLOOD PRESSURE: 116 MMHG | HEIGHT: 70 IN | HEART RATE: 92 BPM | BODY MASS INDEX: 24.62 KG/M2 | DIASTOLIC BLOOD PRESSURE: 62 MMHG

## 2023-11-02 DIAGNOSIS — R41.0 CONFUSION: Primary | ICD-10-CM

## 2023-11-02 RX ORDER — DULOXETIN HYDROCHLORIDE 60 MG/1
CAPSULE, DELAYED RELEASE ORAL
COMMUNITY
Start: 2023-10-23

## 2023-11-02 NOTE — PROGRESS NOTES
"Chief complaint: History of confusion    Patient ID: Jewel Best is a 83 y.o. male.    HPI: I had the pleasure of seeing your patient today.  As you may know he is an 83-year-old gentleman here for the management for confusion.  He was previously seen by my associate in the hospital.  Therefore he is here for follow-up status post discharge.  He is new to me.  A thorough chart review was performed.  He apparently has a history of hypertension, hyperlipidemia and bladder cancer.  He was seen after being found with confusion and agitation during his hospitalization for COVID-19.  They did a CT scan of his head which was within normal limits.  He had no focal issues or symptoms during their evaluations.  There was some concern voiced by a family member about \"sundowning\" over the past several years.  He also exhibited poor living conditions.  The patient today says that he is doing much better from that perspective.  He does acknowledge having some issues with cleaning his home however that is improved.  He does maintain his own finances and says he has no issues with that.  He does drive and says that he has never gotten lost while driving.  He denies people telling him that he repeats himself.  He does write a lot of things down typically.  This includes dates and times of important events and appointments.  He does not lose things often.  He has not left the stove on or the water running at home.  He does live alone.    The following portions of the patient's history were reviewed and updated as appropriate: allergies, current medications, past family history, past medical history, past social history, past surgical history and problem list.    Review of Systems   Musculoskeletal:  Negative for back pain, gait problem and neck pain.   Neurological:  Negative for dizziness, tremors, seizures, syncope, facial asymmetry, speech difficulty, weakness, light-headedness, numbness and headaches. "   Psychiatric/Behavioral:  Positive for confusion. Negative for agitation, behavioral problems, decreased concentration, dysphoric mood, hallucinations, self-injury, sleep disturbance and suicidal ideas. The patient is nervous/anxious. The patient is not hyperactive.       I have reviewed the review of systems above performed by my medical assistant.      Vitals:    23 1232   BP: 116/62   Pulse: 92   SpO2: 98%       Neurologic Exam     Mental Status   Oriented to person, place, and time.   Registration: recalls 3 of 3 objects. Follows 3 step commands.   Attention: normal. Concentration: normal.   Speech: speech is normal   Level of consciousness: alert  Knowledge: consistent with education (No deficits found.).   Normal comprehension.     Cranial Nerves     CN II   Visual fields full to confrontation.     CN III, IV, VI   Pupils are equal, round, and reactive to light.  Extraocular motions are normal.   CN III: no CN III palsy  CN VI: no CN VI palsy  Nystagmus: none   Diplopia: none    CN V   Facial sensation intact.     CN VII   Facial expression full, symmetric.     CN VIII   CN VIII normal.     CN IX, X   CN IX normal.   CN X normal.     CN XI   CN XI normal.     CN XII   CN XII normal.     Motor Exam   Muscle bulk: normal  Right arm tone: normal  Left arm tone: normal  Right leg tone: normal  Left leg tone: normal    Strength   Right neck flexion: 5/5  Left neck flexion: 5/5  Right neck extension: 5/5  Left neck extension: 5/5  Right deltoid: 5/5  Left deltoid: 5/5  Right biceps: 5/5  Left biceps: 5/5  Right triceps: 5/5  Left triceps: 5/5  Right wrist flexion: 5/5  Left wrist flexion: 5/5  Right wrist extension: 5/5  Left wrist extension: 5/5  Right interossei: 5/5  Left interossei: 5/5  Right abdominals: 5/5  Left abdominals: 5/5  Right iliopsoas: 5/5  Left iliopsoas: 5/5  Right quadriceps: 5/5  Left quadriceps: 5/5  Right hamstrin/5  Left hamstrin/5  Right glutei: 5/5  Left glutei: 5/5  Right  anterior tibial: 5/5  Left anterior tibial: 5/5  Right posterior tibial: 5/5  Left posterior tibial: 5/5  Right peroneal: 5/5  Left peroneal: 5/5  Right gastroc: 5/5  Left gastroc: 5/5    Sensory Exam   Light touch normal.   Vibration normal.   Proprioception normal.   Pinprick normal.     Gait, Coordination, and Reflexes     Gait  Gait: normal    Coordination   Romberg: negative    Tremor   Resting tremor: absent  Intention tremor: absent    Reflexes   Right brachioradialis: 2+  Left brachioradialis: 2+  Right biceps: 2+  Left biceps: 2+  Right triceps: 2+  Left triceps: 2+  Right patellar: 2+  Left patellar: 2+  Right achilles: 2+  Left achilles: 2+  Right : 2+  Left : 2+Station is normal.       Physical Exam  Vitals reviewed.   Constitutional:       Appearance: He is well-developed.   HENT:      Head: Normocephalic and atraumatic.   Eyes:      Extraocular Movements: EOM normal.      Pupils: Pupils are equal, round, and reactive to light.   Cardiovascular:      Rate and Rhythm: Normal rate and regular rhythm.   Pulmonary:      Breath sounds: Normal breath sounds.   Musculoskeletal:         General: Normal range of motion.   Skin:     General: Skin is warm.   Neurological:      Mental Status: He is oriented to person, place, and time.      Coordination: Romberg Test normal.      Gait: Gait is intact.      Deep Tendon Reflexes:      Reflex Scores:       Tricep reflexes are 2+ on the right side and 2+ on the left side.       Bicep reflexes are 2+ on the right side and 2+ on the left side.       Brachioradialis reflexes are 2+ on the right side and 2+ on the left side.       Patellar reflexes are 2+ on the right side and 2+ on the left side.       Achilles reflexes are 2+ on the right side and 2+ on the left side.  Psychiatric:         Speech: Speech normal.         Procedures    Assessment/Plan: We I had a long discussion about short-term memory issues and cognitive impairment.  I did bring up the idea of  scheduling neuropsych testing as well as get an MRI of his brain both with and without contrast.  He would like to hold off on that.  He is adamant that we revisit this subject in the future if things change for him.  We will see him here on an as-needed basis at this point.  A total of 30 minutes was spent face-to-face with the patient today.  Of that greater than 50% of this time was spent discussing signs and symptoms of confusion, patient education, plan of care and prognosis.         Diagnoses and all orders for this visit:    1. Confusion (Primary)           Lico Menjivar II, MD

## 2023-11-07 ENCOUNTER — PATIENT OUTREACH (OUTPATIENT)
Dept: CASE MANAGEMENT | Facility: OTHER | Age: 83
End: 2023-11-07
Payer: MEDICARE

## 2023-11-07 NOTE — OUTREACH NOTE
Patient Outreach    AMBULATORY CASE MANAGEMENT NOTE    Name and Relationship of Patient/Support Person: Jewel Best W - Self    Brief conversation with patient. Reintroduced self and role of ACM. He does not remember speaking with this ACM after ED visit. Discussed previous conversation and depression. Reminded of discussion regarding volunteering and information sent. He states he has no interest in volunteering.  Not open to continued conversation at this time.       Renee WILCOX  Ambulatory Case Management    11/7/2023, 10:26 EST

## 2023-11-08 ENCOUNTER — OFFICE VISIT (OUTPATIENT)
Dept: FAMILY MEDICINE CLINIC | Facility: CLINIC | Age: 83
End: 2023-11-08
Payer: MEDICARE

## 2023-11-08 VITALS
BODY MASS INDEX: 24.62 KG/M2 | HEIGHT: 70 IN | HEART RATE: 81 BPM | DIASTOLIC BLOOD PRESSURE: 74 MMHG | OXYGEN SATURATION: 95 % | RESPIRATION RATE: 16 BRPM | SYSTOLIC BLOOD PRESSURE: 131 MMHG | WEIGHT: 172 LBS

## 2023-11-08 DIAGNOSIS — F32.A DEPRESSION, UNSPECIFIED DEPRESSION TYPE: Primary | ICD-10-CM

## 2023-11-08 PROCEDURE — 3075F SYST BP GE 130 - 139MM HG: CPT | Performed by: INTERNAL MEDICINE

## 2023-11-08 PROCEDURE — 3078F DIAST BP <80 MM HG: CPT | Performed by: INTERNAL MEDICINE

## 2023-11-08 PROCEDURE — 99214 OFFICE O/P EST MOD 30 MIN: CPT | Performed by: INTERNAL MEDICINE

## 2023-11-08 RX ORDER — FLUOXETINE HYDROCHLORIDE 20 MG/1
20 CAPSULE ORAL DAILY
Qty: 30 CAPSULE | Refills: 5 | Status: SHIPPED | OUTPATIENT
Start: 2023-11-08

## 2023-11-08 RX ORDER — LISINOPRIL 10 MG/1
10 TABLET ORAL DAILY
Qty: 30 TABLET | Refills: 5 | Status: SHIPPED | OUTPATIENT
Start: 2023-11-08

## 2023-11-08 NOTE — PROGRESS NOTES
Subjective chief complaint is follow-up on blood pressure  Jewel Best is a 83 y.o. male.     History of Present Illness Jewel is here today for follow-up.  At his last visit his blood pressure was low and we told him to take half of his lisinopril however he went on and stopped it altogether.  I did retake his blood pressure today at 142/76.  I do think he needs to be on a little bit of something we did instruct him that we would put him on 10 mg of lisinopril.  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  He was not tolerating the duloxetine.  We did try him on some trazodone at night to see if that would help him sleep.  He did not take it for very long and stopped that as well because he said it was not working.  We have tried him on numerous medicines in the past including Escitalopram, sertraline, mirtazapine, duloxetine, Trintellix.  He tends to not take them for long enough to get an effective dose.  He is today is complaining of some numbness on the left side of his face.  This is in the region of the left mandible.  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  He reports however this is more recent.  He does report that he was having this at his last visit.  He does have an MRI scan ordered for the dementia.  We will need to see what that looks like in terms of her prior stroke.  However if it does not show up anything I did advise we may need to do an MRI scan of his neck.    The following portions of the patient's history were reviewed and updated as appropriate: allergies, current medications, past family history, past medical history, past social history, past surgical history, and problem list.    Review of Systems   Psychiatric/Behavioral:          He is not necessarily having suicidal ideas.  He just feels like he would be better off dead or what is the point.     Objective   Physical Exam  Vitals and nursing note reviewed.    Constitutional:       Appearance: Normal appearance.   Cardiovascular:      Rate and Rhythm: Normal rate and regular rhythm.   Neurological:      Mental Status: He is alert.   Psychiatric:         Attention and Perception: Attention normal.         Mood and Affect: Affect is flat.         Speech: Speech normal.         Behavior: Behavior is cooperative.         Thought Content: Thought content is not paranoid or delusional. Thought content does not include homicidal ideation. Thought content does not include homicidal or suicidal plan.         Judgment: Judgment is not impulsive.       Assessment & Plan   Diagnoses and all orders for this visit:    1. Depression, unspecified depression type (Primary)  -     Ambulatory Referral to Psychiatry    Other orders  -     lisinopril (PRINIVIL,ZESTRIL) 10 MG tablet; Take 1 tablet by mouth Daily.  Dispense: 30 tablet; Refill: 5  -     FLUoxetine (PROzac) 20 MG capsule; Take 1 capsule by mouth Daily.  Dispense: 30 capsule; Refill: 5      Jewel is here today for follow-up.  He has not taken his medicines as prescribed.  This tends to be a problem for him.  I did have a long discussion with him regarding what he expects my role to be if he does not give the medicines a chance.  He has refused to go see a psychiatrist in the past.  I am going to try referring him again to a psychiatrist.  I am going to try him on some fluoxetine.  I did explain that it does not work instantly.  That he does need to continue taking it until he comes back to see me in a month at which time we will decide whether or not we will increase the dose or add something to it.

## 2023-12-11 ENCOUNTER — PATIENT OUTREACH (OUTPATIENT)
Dept: CASE MANAGEMENT | Facility: OTHER | Age: 83
End: 2023-12-11
Payer: MEDICARE

## 2023-12-11 NOTE — OUTREACH NOTE
Patient Outreach    AMBULATORY CASE MANAGEMENT NOTE    Name and Relationship of Patient/Support Person: Jewel Best W - Self    Very brief conversation with Mr Estephania. Denies any issues. Attempted to discuss last office visit with little success. Reminded of upcoming appointment with PCP. States he does not know what this is for. Reminded of need to monitor his blood pressure with changes and he states its fine. Agrees to call again next month.     Education Documentation  Provider Follow-Up, taught by Renee Ptael, RN at 12/11/2023 10:49 AM.  Learner: Patient  Readiness: Nonacceptance  Method: Explanation  Response: Needs Reinforcement    Medication Management, taught by Renee Patel RN at 12/11/2023 10:49 AM.  Learner: Patient  Readiness: Nonacceptance  Method: Explanation  Response: Needs Reinforcement    Blood Pressure Monitoring, taught by Renee Patel, RN at 12/11/2023 10:49 AM.  Learner: Patient  Readiness: Nonacceptance  Method: Explanation  Response: Needs Reinforcement    Medication Management, taught by Renee Patel, RN at 12/11/2023 10:49 AM.  Learner: Patient  Readiness: Nonacceptance  Method: Explanation  Response: Needs Reinforcement          Renee WILCOX  Ambulatory Case Management    12/11/2023, 10:50 EST

## 2023-12-13 ENCOUNTER — APPOINTMENT (OUTPATIENT)
Dept: CT IMAGING | Facility: HOSPITAL | Age: 83
End: 2023-12-13
Payer: MEDICARE

## 2023-12-13 ENCOUNTER — HOSPITAL ENCOUNTER (OUTPATIENT)
Facility: HOSPITAL | Age: 83
Setting detail: OBSERVATION
Discharge: HOME | End: 2023-12-14
Attending: EMERGENCY MEDICINE | Admitting: EMERGENCY MEDICINE
Payer: MEDICARE

## 2023-12-13 DIAGNOSIS — N17.9 ACUTE KIDNEY INJURY: Primary | ICD-10-CM

## 2023-12-13 DIAGNOSIS — R19.7 DIARRHEA, UNSPECIFIED TYPE: ICD-10-CM

## 2023-12-13 LAB
ADV 40+41 DNA STL QL NAA+NON-PROBE: NOT DETECTED
ALBUMIN SERPL-MCNC: 3.8 G/DL (ref 3.5–5.2)
ALBUMIN/GLOB SERPL: 1.3 G/DL
ALP SERPL-CCNC: 91 U/L (ref 39–117)
ALT SERPL W P-5'-P-CCNC: 13 U/L (ref 1–41)
ANION GAP SERPL CALCULATED.3IONS-SCNC: 13 MMOL/L (ref 5–15)
ANION GAP SERPL CALCULATED.3IONS-SCNC: 15 MMOL/L (ref 5–15)
AST SERPL-CCNC: 13 U/L (ref 1–40)
ASTRO TYP 1-8 RNA STL QL NAA+NON-PROBE: NOT DETECTED
BACTERIA UR QL AUTO: ABNORMAL /HPF
BASOPHILS # BLD AUTO: 0.03 10*3/MM3 (ref 0–0.2)
BASOPHILS NFR BLD AUTO: 0.2 % (ref 0–1.5)
BILIRUB SERPL-MCNC: 0.3 MG/DL (ref 0–1.2)
BILIRUB UR QL STRIP: NEGATIVE
BUN SERPL-MCNC: 18 MG/DL (ref 8–23)
BUN SERPL-MCNC: 22 MG/DL (ref 8–23)
BUN/CREAT SERPL: 14.2 (ref 7–25)
BUN/CREAT SERPL: 16.3 (ref 7–25)
C CAYETANENSIS DNA STL QL NAA+NON-PROBE: NOT DETECTED
C COLI+JEJ+UPSA DNA STL QL NAA+NON-PROBE: NOT DETECTED
C DIFF TOX GENS STL QL NAA+PROBE: NEGATIVE
CALCIUM SPEC-SCNC: 8.5 MG/DL (ref 8.6–10.5)
CALCIUM SPEC-SCNC: 9 MG/DL (ref 8.6–10.5)
CHLORIDE SERPL-SCNC: 107 MMOL/L (ref 98–107)
CHLORIDE SERPL-SCNC: 108 MMOL/L (ref 98–107)
CLARITY UR: ABNORMAL
CO2 SERPL-SCNC: 14 MMOL/L (ref 22–29)
CO2 SERPL-SCNC: 18 MMOL/L (ref 22–29)
COLOR UR: YELLOW
CREAT SERPL-MCNC: 1.27 MG/DL (ref 0.76–1.27)
CREAT SERPL-MCNC: 1.35 MG/DL (ref 0.76–1.27)
CRYPTOSP DNA STL QL NAA+NON-PROBE: NOT DETECTED
DEPRECATED RDW RBC AUTO: 45.4 FL (ref 37–54)
E HISTOLYT DNA STL QL NAA+NON-PROBE: NOT DETECTED
EAEC PAA PLAS AGGR+AATA ST NAA+NON-PRB: NOT DETECTED
EC STX1+STX2 GENES STL QL NAA+NON-PROBE: NOT DETECTED
EGFRCR SERPLBLD CKD-EPI 2021: 51.5 ML/MIN/1.73
EGFRCR SERPLBLD CKD-EPI 2021: 55.4 ML/MIN/1.73
EOSINOPHIL # BLD AUTO: 1.35 10*3/MM3 (ref 0–0.4)
EOSINOPHIL NFR BLD AUTO: 7.9 % (ref 0.3–6.2)
EPEC EAE GENE STL QL NAA+NON-PROBE: NOT DETECTED
ERYTHROCYTE [DISTWIDTH] IN BLOOD BY AUTOMATED COUNT: 13.2 % (ref 12.3–15.4)
ETEC LTA+ST1A+ST1B TOX ST NAA+NON-PROBE: NOT DETECTED
G LAMBLIA DNA STL QL NAA+NON-PROBE: NOT DETECTED
GLOBULIN UR ELPH-MCNC: 2.9 GM/DL
GLUCOSE SERPL-MCNC: 109 MG/DL (ref 65–99)
GLUCOSE SERPL-MCNC: 138 MG/DL (ref 65–99)
GLUCOSE UR STRIP-MCNC: NEGATIVE MG/DL
HCT VFR BLD AUTO: 40 % (ref 37.5–51)
HGB BLD-MCNC: 13.1 G/DL (ref 13–17.7)
HGB UR QL STRIP.AUTO: ABNORMAL
HOLD SPECIMEN: NORMAL
HOLD SPECIMEN: NORMAL
HYALINE CASTS UR QL AUTO: ABNORMAL /LPF
IMM GRANULOCYTES # BLD AUTO: 0.07 10*3/MM3 (ref 0–0.05)
IMM GRANULOCYTES NFR BLD AUTO: 0.4 % (ref 0–0.5)
KETONES UR QL STRIP: ABNORMAL
LEUKOCYTE ESTERASE UR QL STRIP.AUTO: NEGATIVE
LIPASE SERPL-CCNC: 16 U/L (ref 13–60)
LYMPHOCYTES # BLD AUTO: 3.27 10*3/MM3 (ref 0.7–3.1)
LYMPHOCYTES NFR BLD AUTO: 19.2 % (ref 19.6–45.3)
MAGNESIUM SERPL-MCNC: 1.8 MG/DL (ref 1.6–2.4)
MCH RBC QN AUTO: 30.3 PG (ref 26.6–33)
MCHC RBC AUTO-ENTMCNC: 32.8 G/DL (ref 31.5–35.7)
MCV RBC AUTO: 92.6 FL (ref 79–97)
MONOCYTES # BLD AUTO: 1.06 10*3/MM3 (ref 0.1–0.9)
MONOCYTES NFR BLD AUTO: 6.2 % (ref 5–12)
NEUTROPHILS NFR BLD AUTO: 11.27 10*3/MM3 (ref 1.7–7)
NEUTROPHILS NFR BLD AUTO: 66.1 % (ref 42.7–76)
NITRITE UR QL STRIP: NEGATIVE
NOROVIRUS GI+II RNA STL QL NAA+NON-PROBE: NOT DETECTED
NRBC BLD AUTO-RTO: 0 /100 WBC (ref 0–0.2)
P SHIGELLOIDES DNA STL QL NAA+NON-PROBE: NOT DETECTED
PH UR STRIP.AUTO: <=5 [PH] (ref 5–8)
PLATELET # BLD AUTO: 255 10*3/MM3 (ref 140–450)
PMV BLD AUTO: 10.3 FL (ref 6–12)
POTASSIUM SERPL-SCNC: 3.9 MMOL/L (ref 3.5–5.2)
POTASSIUM SERPL-SCNC: 3.9 MMOL/L (ref 3.5–5.2)
PROT SERPL-MCNC: 6.7 G/DL (ref 6–8.5)
PROT UR QL STRIP: ABNORMAL
RBC # BLD AUTO: 4.32 10*6/MM3 (ref 4.14–5.8)
RBC # UR STRIP: ABNORMAL /HPF
REF LAB TEST METHOD: ABNORMAL
RVA RNA STL QL NAA+NON-PROBE: NOT DETECTED
S ENT+BONG DNA STL QL NAA+NON-PROBE: NOT DETECTED
SAPO I+II+IV+V RNA STL QL NAA+NON-PROBE: NOT DETECTED
SHIGELLA SP+EIEC IPAH ST NAA+NON-PROBE: NOT DETECTED
SODIUM SERPL-SCNC: 136 MMOL/L (ref 136–145)
SODIUM SERPL-SCNC: 139 MMOL/L (ref 136–145)
SP GR UR STRIP: 1.03 (ref 1–1.03)
SQUAMOUS #/AREA URNS HPF: ABNORMAL /HPF
UROBILINOGEN UR QL STRIP: ABNORMAL
V CHOL+PARA+VUL DNA STL QL NAA+NON-PROBE: NOT DETECTED
V CHOLERAE DNA STL QL NAA+NON-PROBE: NOT DETECTED
WBC # UR STRIP: ABNORMAL /HPF
WBC NRBC COR # BLD AUTO: 17.05 10*3/MM3 (ref 3.4–10.8)
WHOLE BLOOD HOLD COAG: NORMAL
WHOLE BLOOD HOLD SPECIMEN: NORMAL
Y ENTEROCOL DNA STL QL NAA+NON-PROBE: NOT DETECTED

## 2023-12-13 PROCEDURE — 96360 HYDRATION IV INFUSION INIT: CPT

## 2023-12-13 PROCEDURE — G0378 HOSPITAL OBSERVATION PER HR: HCPCS

## 2023-12-13 PROCEDURE — 81001 URINALYSIS AUTO W/SCOPE: CPT | Performed by: NURSE PRACTITIONER

## 2023-12-13 PROCEDURE — 25810000003 SODIUM CHLORIDE 0.9 % SOLUTION: Performed by: NURSE PRACTITIONER

## 2023-12-13 PROCEDURE — 25810000003 SODIUM CHLORIDE 0.9 % SOLUTION: Performed by: EMERGENCY MEDICINE

## 2023-12-13 PROCEDURE — 74176 CT ABD & PELVIS W/O CONTRAST: CPT

## 2023-12-13 PROCEDURE — 80053 COMPREHEN METABOLIC PANEL: CPT | Performed by: NURSE PRACTITIONER

## 2023-12-13 PROCEDURE — 99284 EMERGENCY DEPT VISIT MOD MDM: CPT

## 2023-12-13 PROCEDURE — 83690 ASSAY OF LIPASE: CPT | Performed by: NURSE PRACTITIONER

## 2023-12-13 PROCEDURE — 83735 ASSAY OF MAGNESIUM: CPT | Performed by: NURSE PRACTITIONER

## 2023-12-13 PROCEDURE — 87507 IADNA-DNA/RNA PROBE TQ 12-25: CPT | Performed by: NURSE PRACTITIONER

## 2023-12-13 PROCEDURE — 87493 C DIFF AMPLIFIED PROBE: CPT | Performed by: NURSE PRACTITIONER

## 2023-12-13 PROCEDURE — 85025 COMPLETE CBC W/AUTO DIFF WBC: CPT | Performed by: NURSE PRACTITIONER

## 2023-12-13 PROCEDURE — 25810000003 LACTATED RINGERS PER 1000 ML: Performed by: STUDENT IN AN ORGANIZED HEALTH CARE EDUCATION/TRAINING PROGRAM

## 2023-12-13 RX ORDER — SODIUM CHLORIDE 0.9 % (FLUSH) 0.9 %
10 SYRINGE (ML) INJECTION EVERY 12 HOURS SCHEDULED
Status: DISCONTINUED | OUTPATIENT
Start: 2023-12-13 | End: 2023-12-14 | Stop reason: HOSPADM

## 2023-12-13 RX ORDER — ONDANSETRON 2 MG/ML
4 INJECTION INTRAMUSCULAR; INTRAVENOUS EVERY 6 HOURS PRN
Status: DISCONTINUED | OUTPATIENT
Start: 2023-12-13 | End: 2023-12-14 | Stop reason: HOSPADM

## 2023-12-13 RX ORDER — SODIUM CHLORIDE, SODIUM LACTATE, POTASSIUM CHLORIDE, CALCIUM CHLORIDE 600; 310; 30; 20 MG/100ML; MG/100ML; MG/100ML; MG/100ML
100 INJECTION, SOLUTION INTRAVENOUS CONTINUOUS
Status: DISCONTINUED | OUTPATIENT
Start: 2023-12-13 | End: 2023-12-14 | Stop reason: HOSPADM

## 2023-12-13 RX ORDER — FLUOXETINE HYDROCHLORIDE 20 MG/1
20 CAPSULE ORAL DAILY
Status: DISCONTINUED | OUTPATIENT
Start: 2023-12-13 | End: 2023-12-14 | Stop reason: HOSPADM

## 2023-12-13 RX ORDER — LISINOPRIL 10 MG/1
10 TABLET ORAL DAILY
Status: DISCONTINUED | OUTPATIENT
Start: 2023-12-13 | End: 2023-12-14 | Stop reason: HOSPADM

## 2023-12-13 RX ORDER — SODIUM CHLORIDE 0.9 % (FLUSH) 0.9 %
10 SYRINGE (ML) INJECTION AS NEEDED
Status: DISCONTINUED | OUTPATIENT
Start: 2023-12-13 | End: 2023-12-14 | Stop reason: HOSPADM

## 2023-12-13 RX ORDER — ONDANSETRON 4 MG/1
4 TABLET, FILM COATED ORAL EVERY 6 HOURS PRN
Status: DISCONTINUED | OUTPATIENT
Start: 2023-12-13 | End: 2023-12-14 | Stop reason: HOSPADM

## 2023-12-13 RX ORDER — CHOLECALCIFEROL (VITAMIN D3) 125 MCG
5 CAPSULE ORAL NIGHTLY PRN
Status: DISCONTINUED | OUTPATIENT
Start: 2023-12-13 | End: 2023-12-14 | Stop reason: HOSPADM

## 2023-12-13 RX ORDER — SODIUM CHLORIDE 9 MG/ML
40 INJECTION, SOLUTION INTRAVENOUS AS NEEDED
Status: DISCONTINUED | OUTPATIENT
Start: 2023-12-13 | End: 2023-12-14 | Stop reason: HOSPADM

## 2023-12-13 RX ORDER — CHOLESTYRAMINE 4 G/9G
1 POWDER, FOR SUSPENSION ORAL EVERY 12 HOURS SCHEDULED
Status: DISCONTINUED | OUTPATIENT
Start: 2023-12-13 | End: 2023-12-14 | Stop reason: HOSPADM

## 2023-12-13 RX ADMIN — FLUOXETINE HYDROCHLORIDE 20 MG: 20 CAPSULE ORAL at 16:49

## 2023-12-13 RX ADMIN — SODIUM CHLORIDE, POTASSIUM CHLORIDE, SODIUM LACTATE AND CALCIUM CHLORIDE 100 ML/HR: 600; 310; 30; 20 INJECTION, SOLUTION INTRAVENOUS at 16:49

## 2023-12-13 RX ADMIN — SODIUM CHLORIDE 500 ML: 9 INJECTION, SOLUTION INTRAVENOUS at 11:24

## 2023-12-13 RX ADMIN — Medication 10 ML: at 14:35

## 2023-12-13 RX ADMIN — SODIUM CHLORIDE 500 ML: 9 INJECTION, SOLUTION INTRAVENOUS at 10:08

## 2023-12-13 RX ADMIN — LISINOPRIL 10 MG: 10 TABLET ORAL at 16:49

## 2023-12-13 NOTE — PLAN OF CARE
Goal Outcome Evaluation:patient came to obs unit for eval/tx of diarrhea x 4 days.  Patient gi panel and cdiff labs were negative this shift. Patient wbc are elevated at 17, but he is not septic and therefore no sepsis protocol orders were followed. Patient is receiving iv hydration with lactated ringers at 100ml/hour. Patient is on a clear liquid diet. Patient states he takes lisinopril daily at home, but he has not been taking his prozac, because patient says he has not seen any positive effects. He did take that med this shift from this nurse. Patient was given hand out and education on questran medication and at this time , patient is declining this medication. Patient uses no medical devices at home and walks unassisted and drives himself to the grocery store and performs all errands himself. Patient said at times, he has some short term memory issues , but that his long term memory is 100% intact and he is capable of making all of his own decisions.

## 2023-12-13 NOTE — ED PROVIDER NOTES
EMERGENCY DEPARTMENT ENCOUNTER    Room Number:  06/06  PCP: Ernie Panda MD  Historian: patient      HPI:  Chief Complaint: diarrhea  A complete HPI/ROS/PMH/PSH/SH/FH are unobtainable due to: vague historianb  Context: Jewel Best is a pleasant afebrile 83 y.o.  male who presents to the ED c/o diarrhea for the past week.       States he has had diarrhea and feels dehydrated, reports that he was seen 2 days ago [facility] but was worried because he is not feeling any better.  Lives alone states he has had a poor appetite for quite some time but this is worsened given recent GI illness  No pain  No recent antibitiotic usage  No fever or chills  He denies any abdominal pain.  No blood in his stool.  No vomiting.      PAST MEDICAL HISTORY  Active Ambulatory Problems     Diagnosis Date Noted    Anemia 04/25/2016    Anxiety disorder 04/25/2016    Benign essential hypertension 04/25/2016    Eczema 04/25/2016    Hyperlipidemia 04/25/2016    Fistula of skin 06/29/2016    Lymphoma in remission 09/01/2016    Body mass index (BMI) 30.0-30.9, adult  11/15/2021    Major depression, single episode 07/24/2023    COVID-19 08/25/2023    VETO (acute kidney injury) 08/26/2023    Mild cognitive impairment 08/31/2023     Resolved Ambulatory Problems     Diagnosis Date Noted    No Resolved Ambulatory Problems     Past Medical History:   Diagnosis Date    Anxiety     Bladder cancer     Depression     Eating disorder     Hypertension     Lymphoma          PAST SURGICAL HISTORY  Past Surgical History:   Procedure Laterality Date    COLONOSCOPY      KNEE SURGERY      bakers cyst removal         FAMILY HISTORY  Family History   Family history unknown: Yes         SOCIAL HISTORY  Social History     Socioeconomic History    Marital status:    Tobacco Use    Smoking status: Former     Packs/day: 1.00     Years: 10.00     Additional pack years: 0.00     Total pack years: 10.00     Types: Cigarettes      Quit date: 1971     Years since quittin.0     Passive exposure: Past    Smokeless tobacco: Never   Vaping Use    Vaping Use: Never used   Substance and Sexual Activity    Alcohol use: No    Drug use: No    Sexual activity: Not Currently     Partners: Male         ALLERGIES  Patient has no known allergies.        REVIEW OF SYSTEMS  Review of Systems   Constitutional:  Positive for fatigue.   Gastrointestinal:  Positive for diarrhea.          PHYSICAL EXAM  ED Triage Vitals   Temp Heart Rate Resp BP SpO2   23 0936 23   96.7 °F (35.9 °C) 96 18 116/58 100 %      Temp src Heart Rate Source Patient Position BP Location FiO2 (%)   23 -- -- -- --   Tympanic           Physical Exam      GENERAL: Alert and oriented x 4, no acute distress  HENT: nares patent, mucous membranes are dry and tacky  EYES: no scleral icterus, no injection  CV: regular rhythm, normal rate, no murmur peripheral edema  RESPIRATORY: normal effort, clear to auscultation all fields bilaterally  ABDOMEN: soft and nontender diffusely, no rebound or guarding, mild abdominal obesity present  MUSCULOSKELETAL: no deformity, normal active range of motion all extremities  NEURO: alert, moves all extremities, follows commands  PSYCH:  calm, cooperative  SKIN: warm, dry and intact    Vital signs and nursing notes reviewed.          LAB RESULTS  Recent Results (from the past 24 hour(s))   Comprehensive Metabolic Panel    Collection Time: 23 10:08 AM    Specimen: Blood   Result Value Ref Range    Glucose 109 (H) 65 - 99 mg/dL    BUN 22 8 - 23 mg/dL    Creatinine 1.35 (H) 0.76 - 1.27 mg/dL    Sodium 136 136 - 145 mmol/L    Potassium 3.9 3.5 - 5.2 mmol/L    Chloride 107 98 - 107 mmol/L    CO2 14.0 (L) 22.0 - 29.0 mmol/L    Calcium 9.0 8.6 - 10.5 mg/dL    Total Protein 6.7 6.0 - 8.5 g/dL    Albumin 3.8 3.5 - 5.2 g/dL    ALT (SGPT) 13 1 - 41 U/L    AST (SGOT) 13 1 - 40 U/L     Alkaline Phosphatase 91 39 - 117 U/L    Total Bilirubin 0.3 0.0 - 1.2 mg/dL    Globulin 2.9 gm/dL    A/G Ratio 1.3 g/dL    BUN/Creatinine Ratio 16.3 7.0 - 25.0    Anion Gap 15.0 5.0 - 15.0 mmol/L    eGFR 51.5 (L) >60.0 mL/min/1.73   Lipase    Collection Time: 12/13/23 10:08 AM    Specimen: Blood   Result Value Ref Range    Lipase 16 13 - 60 U/L   Magnesium    Collection Time: 12/13/23 10:08 AM    Specimen: Blood   Result Value Ref Range    Magnesium 1.8 1.6 - 2.4 mg/dL   Green Top (Gel)    Collection Time: 12/13/23 10:08 AM   Result Value Ref Range    Extra Tube Hold for add-ons.    Lavender Top    Collection Time: 12/13/23 10:08 AM   Result Value Ref Range    Extra Tube hold for add-on    Gold Top - SST    Collection Time: 12/13/23 10:08 AM   Result Value Ref Range    Extra Tube Hold for add-ons.    Light Blue Top    Collection Time: 12/13/23 10:08 AM   Result Value Ref Range    Extra Tube Hold for add-ons.    CBC Auto Differential    Collection Time: 12/13/23 10:08 AM    Specimen: Blood   Result Value Ref Range    WBC 17.05 (H) 3.40 - 10.80 10*3/mm3    RBC 4.32 4.14 - 5.80 10*6/mm3    Hemoglobin 13.1 13.0 - 17.7 g/dL    Hematocrit 40.0 37.5 - 51.0 %    MCV 92.6 79.0 - 97.0 fL    MCH 30.3 26.6 - 33.0 pg    MCHC 32.8 31.5 - 35.7 g/dL    RDW 13.2 12.3 - 15.4 %    RDW-SD 45.4 37.0 - 54.0 fl    MPV 10.3 6.0 - 12.0 fL    Platelets 255 140 - 450 10*3/mm3    Neutrophil % 66.1 42.7 - 76.0 %    Lymphocyte % 19.2 (L) 19.6 - 45.3 %    Monocyte % 6.2 5.0 - 12.0 %    Eosinophil % 7.9 (H) 0.3 - 6.2 %    Basophil % 0.2 0.0 - 1.5 %    Immature Grans % 0.4 0.0 - 0.5 %    Neutrophils, Absolute 11.27 (H) 1.70 - 7.00 10*3/mm3    Lymphocytes, Absolute 3.27 (H) 0.70 - 3.10 10*3/mm3    Monocytes, Absolute 1.06 (H) 0.10 - 0.90 10*3/mm3    Eosinophils, Absolute 1.35 (H) 0.00 - 0.40 10*3/mm3    Basophils, Absolute 0.03 0.00 - 0.20 10*3/mm3    Immature Grans, Absolute 0.07 (H) 0.00 - 0.05 10*3/mm3    nRBC 0.0 0.0 - 0.2 /100 WBC   Urinalysis  With Microscopic If Indicated (No Culture) - Urine, Clean Catch    Collection Time: 12/13/23 11:25 AM    Specimen: Urine, Clean Catch   Result Value Ref Range    Color, UA Yellow Yellow, Straw    Appearance, UA Cloudy (A) Clear    pH, UA <=5.0 5.0 - 8.0    Specific Gravity, UA 1.029 1.005 - 1.030    Glucose, UA Negative Negative    Ketones, UA Trace (A) Negative    Bilirubin, UA Negative Negative    Blood, UA Trace (A) Negative    Protein, UA 30 mg/dL (1+) (A) Negative    Leuk Esterase, UA Negative Negative    Nitrite, UA Negative Negative    Urobilinogen, UA 0.2 E.U./dL 0.2 - 1.0 E.U./dL   Urinalysis, Microscopic Only - Urine, Clean Catch    Collection Time: 12/13/23 11:25 AM    Specimen: Urine, Clean Catch   Result Value Ref Range    RBC, UA None Seen None Seen, 0-2 /HPF    WBC, UA 0-2 None Seen, 0-2 /HPF    Bacteria, UA Trace (A) None Seen /HPF    Squamous Epithelial Cells, UA 0-2 None Seen, 0-2 /HPF    Hyaline Casts, UA 0-2 None Seen /LPF    Methodology Manual Light Microscopy    Gastrointestinal Panel, PCR - Stool, Per Rectum    Collection Time: 12/13/23 11:45 AM    Specimen: Per Rectum; Stool   Result Value Ref Range    Campylobacter Not Detected Not Detected    Plesiomonas shigelloides Not Detected Not Detected    Salmonella Not Detected Not Detected    Vibrio Not Detected Not Detected    Vibrio cholerae Not Detected Not Detected    Yersinia enterocolitica Not Detected Not Detected    Enteroaggregative E. coli (EAEC) Not Detected Not Detected    Enteropathogenic E. coli (EPEC) Not Detected Not Detected    Enterotoxigenic E. coli (ETEC) lt/st Not Detected Not Detected    Shiga-like toxin-producing E. coli (STEC) stx1/stx2 Not Detected Not Detected    Shigella/Enteroinvasive E. coli (EIEC) Not Detected Not Detected    Cryptosporidium Not Detected Not Detected    Cyclospora cayetanensis Not Detected Not Detected    Entamoeba histolytica Not Detected Not Detected    Giardia lamblia Not Detected Not Detected     Adenovirus F40/41 Not Detected Not Detected    Astrovirus Not Detected Not Detected    Norovirus GI/GII Not Detected Not Detected    Rotavirus A Not Detected Not Detected    Sapovirus (I, II, IV or V) Not Detected Not Detected   Clostridioides difficile Toxin, PCR - Stool, Per Rectum    Collection Time: 12/13/23 11:45 AM    Specimen: Per Rectum; Stool   Result Value Ref Range    Toxigenic C. difficile by PCR Negative Negative       Ordered the above labs and reviewed the results.        RADIOLOGY  CT Abdomen Pelvis Without Contrast    Result Date: 12/13/2023  Examination: CT of the abdomen and pelvis without contrast  Technique: CT of the abdomen and pelvis without contrast per protocol. Radiation dose reduction techniques were utilized, including automated exposure control and exposure modulation based on body size.   History: Abdominal pain, diarrhea, acute renal insufficiency, and leukocytosis  Comparison: None available  Findings: Limited evaluation of the inferior thorax demonstrates a right middle lobe pleural-based pulmonary nodule measuring 5 mm on series 2, image 7. Atelectasis and scarring are seen at the bases, without consolidation, pleural effusion, or pneumothorax. The heart is normal in size, without pericardial effusion. Coronary artery and mitral annular calcifications are seen. There is a large sliding-type hiatal hernia with intrathoracic stomach.  Low-attenuation hepatic lesions are technically indeterminate, likely cysts. High-density material in the gallbladder may reflect contrast. Colonic diverticula are seen, without evidence of acute diverticulitis. Likely contrast is seen in the urinary bladder. There are small bilateral fat-containing inguinal hernias. There is liquid appearing stool in the rectum.  The spleen, adrenal glands, kidneys, pancreas, small bowel, and abdominal vasculature are normal as evaluated on this noncontrast examination. No intraperitoneal fluid collection or free gas  are seen. No enlarged lymph nodes are demonstrated.  Bone windows demonstrate degenerative changes, without suspicious osseous lesion seen. There is a right hip arthroplasty.      1. Right middle lobe nodule. Optional 1 year follow-up may be obtained obtained if clinically indicated  2. Liquid appearing stool in the rectum, compatible with history of diarrhea  3. Incidental findings as above.  This report was finalized on 12/13/2023 11:44 AM by Dr. Hai Abdalla M.D on Workstation: [id number]       Ordered the above noted radiological studies. Reviewed by me in PACS.            PROCEDURES  Procedures      MEDICATIONS GIVEN IN ER  Medications   sodium chloride 0.9 % flush 10 mL (has no administration in time range)   sodium chloride 0.9 % bolus 500 mL (500 mL Intravenous New Bag 12/13/23 1008)         MEDICAL DECISION MAKING, PROGRESS, and CONSULTS    All labs have been independently reviewed by me.  All radiology studies have been reviewed by me and I have also reviewed the radiology report.   EKG's independently viewed and interpreted by me.  Discussion below represents my analysis of pertinent findings related to patient's condition, differential diagnosis, treatment plan and final disposition.      Additional sources:  - Discussed/ obtained information from independent historians:  hx obtained from pt    - External (non-ED) record review: 12/11/23 ct abdomen pelvis  IMPRESSION:     1. Fluid-filled mildly prominent loops of small bowel with fluid in   the proximal colon, findings favor to represent gastroenteritis. No   bowel obstruction. Appendix is normal.     2. Stable large sliding hiatal hernia.     3. Colonic diverticulosis.     4. Prostatomegaly.     - Chronic or social conditions impacting care: Patient reports he lives at home alone and does not have good social support    - Shared decision making: Discussed test results and treatment options, he advises that he would like to not go home today and be  monitored in the hospital because he still feeling quite dehydrated      Orders placed during this visit:  Orders Placed This Encounter   Procedures    [place] Draw    Comprehensive Metabolic Panel    Lipase    Urinalysis With Microscopic If Indicated (No Culture) - Urine, Clean Catch    Magnesium    CBC Auto Differential    NPO Diet NPO Type: Strict NPO    Insert Peripheral IV    CBC & Differential    Green Top (Gel)    Lavender Top    Gold Top - SST    Light Blue Top         Additional orders considered but not ordered:  I considered IV antibiotics but will hold off given that his stool studies have not returned yet in case this is a viral presentation        Differential diagnosis includes but is not limited to:    Colitis, diverticulitis, C. difficile      Independent interpretation of labs, radiology studies, and discussions with consultants:  ED Course as of 12/13/23 1315   Wed Dec 13, 2023   1126 WBC(!): 17.05  13.36 two days ago [DC]   1126 Hemoglobin: 13.1 [DC]   1126 Platelets: 255 [DC]   1126 Glucose(!): 109 [DC]   1126 BUN: 22 [DC]   1126 Creatinine(!): 1.35  1.17 one moth ago [DC]   1126 Sodium: 136 [DC]   1126 Potassium: 3.9 [DC]   1126 CO2(!): 14.0 [DC]   1126 ALT (SGPT): 13 [DC]   1126 AST (SGOT): 13 [DC]   1126 Alkaline Phosphatase: 91 [DC]   1126 Total Bilirubin: 0.3 [DC]   1126 Lipase: 16 [DC]   1126 Magnesium: 1.8 [DC]   1247 Phone call with shaq espitia pa-c.  Discussed the patient, relevant history, exam, diagnostics, ED findings/progress, and concerns.  They agree to admit to the ED Obs unit on behalf of dr. genao   []   1252 CT Abdomen Pelvis Without Contrast  Per my independent interpretation of the CT abdomen and pelvis, contrast-enhanced gallbladder, no evidence of bowel obstruction [DC]   1253 CT Abdomen Pelvis Without Contrast  Radiology report reviewed, right middle lobe nodule with optional 1 year follow-up if needed, liquid stool in the rectum [DC]      ED Course User Index  [AH]  Maritza Sal APRN  [DC] Giovany Barlow MD             I have worn appropriate PPE during this patient encounter, sanitized my hands both with entering and exiting patient's room.      DIAGNOSIS  Final diagnoses:   Acute kidney injury   Diarrhea, unspecified type         DISPOSITION  Admitte to the ED observation unit            Latest Documented Vital Signs:  As of 10:30 EST  BP- 116/58 HR- 96 Temp- 96.7 °F (35.9 °C) (Tympanic) O2 sat- 100%              --    Please note that portions of this were completed with a voice recognition program.       Note Disclaimer: At [facility], we believe that sharing information builds trust and better relationships. You are receiving this note because you are receiving care at [facility] or recently visited. It is possible you will see health information before a provider has talked with you about it. This kind of information can be easy to misunderstand. To help you fully understand what it means for your health, we urge you to discuss this note with your provider.             Maritza Sal APRN  12/13/23 [id number]

## 2023-12-13 NOTE — ED PROVIDER NOTES
Pt presents to the ED c/o 4 days of diarrhea, states that he was seen a couple days ago at [place] and was discharged home.  States that the diarrhea is ongoing.  Denies fevers, nausea, vomiting, abdominal pains.  States that he feels dehydrated.     On exam,   General: No acute distress, nontoxic  HEENT: Mucous membranes moist, atraumatic, EOMI  Neck: Full ROM  Pulm: Symmetric chest rise, nonlabored, lungs CTAB  Cardiovascular: Regular rate and rhythm, intact distal pulses  GI: Soft, nontender, nondistended, no rebound, no guarding, bowel sounds present  MSK: Full ROM, no deformity  Skin: Warm, dry  Neuro: Awake, alert, oriented x 4, GCS 15, moving all extremities, no focal deficits  Psych: Calm, cooperative          Plan:   ED Course as of 12/13/23 1330   Wed Dec 13, 2023   1126 WBC(!): 17.05  13.36 two days ago [DC]   1126 Hemoglobin: 13.1 [DC]   1126 Platelets: 255 [DC]   1126 Glucose(!): 109 [DC]   1126 BUN: 22 [DC]   1126 Creatinine(!): 1.35  1.17 one moth ago [DC]   1126 Sodium: 136 [DC]   1126 Potassium: 3.9 [DC]   1126 CO2(!): 14.0 [DC]   1126 ALT (SGPT): 13 [DC]   1126 AST (SGOT): 13 [DC]   1126 Alkaline Phosphatase: 91 [DC]   1126 Total Bilirubin: 0.3 [DC]   1126 Lipase: 16 [DC]   1126 Magnesium: 1.8 [DC]   1247 Phone call with shaq espitia pa-c.  Discussed the patient, relevant history, exam, diagnostics, ED findings/progress, and concerns.  They agree to admit to the ED Obs unit on behalf of dr. genao   [AH]   1252 CT Abdomen Pelvis Without Contrast  Per my independent interpretation of the CT abdomen and pelvis, contrast-enhanced gallbladder, no evidence of bowel obstruction [DC]   1253 CT Abdomen Pelvis Without Contrast  Radiology report reviewed, right middle lobe nodule with optional 1 year follow-up if needed, liquid stool in the rectum [DC]      ED Course User Index  [AH] Maritza Sal APRN  [DC] Giovany Barlow MD     Plan for labs, repeat CT scan given the worsening leukocytosis and ongoing  symptoms, plan for stool samples and IV fluids and potentially hospitalization.    Plan for hospitalization for further evaluation and management.     Attestation:  The [facility]INA and I have discussed this patient's history, physical exam, and treatment plan.  I have reviewed the documentation and personally had a face to face interaction with the patient. I affirm the documentation and agree with the treatment and plan.  The attached note describes my personal findings.            Giovany Barlow MD  12/13/23 [id number]

## 2023-12-13 NOTE — H&P
[facility]   HISTORY AND PHYSICAL    Patient Name: Jewel Best  : 1940  MRN: [id number]  Primary Care Physician:  Ernie Panda MD  Date of admission: 2023    Subjective   Subjective     Chief Complaint:   Chief Complaint   Patient presents with    Diarrhea         HPI:    Jewel Best is a 83 y.o. male with a history of hypertension, hyperlipidemia, bladder cancer who presents to [facility] ER with diarrhea. Patient states he has had persistent loose stools for 1 week.  He denies any visible blood or black tarry quality to the stools.  He states previously before this he has had some issues with constipation.  He also report his appetite has been very low but it seems like it has been worsening over the past several months.  He denies any abdominal pain with this.  He does state he vomited once 2 days ago but has not since.  Denies fevers and chills.  Denies lightheadedness and dizziness.  Denies chest pain and shortness of breath.  He denies recent sick contacts.  Denies recent antibiotic use.    Review of Systems   All systems were reviewed and negative except for: what is mentioned above in the HPI.    Personal History     Past Medical History:   Diagnosis Date    Anemia     Anxiety     Bladder cancer     Depression     Eating disorder     Hyperlipidemia     Hypertension     Lymphoma        Past Surgical History:   Procedure Laterality Date    COLONOSCOPY      KNEE SURGERY      bakers cyst removal       Family History: Family history is unknown by patient. Otherwise pertinent FHx was reviewed and not pertinent to current issue.    Social History:  reports that he quit smoking about 52 years ago. His smoking use included cigarettes. He has a 10.00 pack-year smoking history. He has been exposed to tobacco smoke. He has never used smokeless tobacco. He reports that he does not drink alcohol and does not use drugs.    Home Medications:  FLUoxetine and  lisinopril    Allergies:  No Known Allergies    Objective   Objective     Vitals:   Temp:  [96.7 °F (35.9 °C)] 96.7 °F (35.9 °C)  Heart Rate:  [89-96] 89  Resp:  [16-18] 16  BP: (116-126)/(58-69) 126/69  Physical Exam    Constitutional: 83-year-old male in no acute distress on room air   Eyes: PERRLA, sclerae anicteric, no conjunctival injection   HENT: NCAT, mucous membranes moist   Neck: Supple, no thyromegaly, no lymphadenopathy, trachea midline   Respiratory: Clear to auscultation bilaterally, nonlabored respirations    Cardiovascular: RRR, no murmurs, rubs, or gallops, palpable pedal pulses bilaterally   Gastrointestinal: Positive bowel sounds, soft, nontender, nondistended   Musculoskeletal: No bilateral ankle edema, no clubbing or cyanosis to extremities   Psychiatric: Appropriate affect, cooperative   Neurologic: Oriented x 3, strength symmetric in all extremities, Cranial Nerves grossly intact to confrontation, speech clear   Skin: No rashes     Result Review    Result Review:  I have personally reviewed the results from the time of this admission to 12/13/2023 13:46 EST and agree with these findings:  [x]  Laboratory list / accordion  []  Microbiology  [x]  Radiology  []  EKG/Telemetry   []  Cardiology/Vascular   []  Pathology  [x]  Old records  []  Other:  Most notable findings include:     CT Abdomen Pelvis Without Contrast    Result Date: 12/13/2023  Examination: CT of the abdomen and pelvis without contrast  Technique: CT of the abdomen and pelvis without contrast per protocol. Radiation dose reduction techniques were utilized, including automated exposure control and exposure modulation based on body size.   History: Abdominal pain, diarrhea, acute renal insufficiency, and leukocytosis  Comparison: None available  Findings: Limited evaluation of the inferior thorax demonstrates a right middle lobe pleural-based pulmonary nodule measuring 5 mm on series 2, image 7. Atelectasis and scarring are seen at  the bases, without consolidation, pleural effusion, or pneumothorax. The heart is normal in size, without pericardial effusion. Coronary artery and mitral annular calcifications are seen. There is a large sliding-type hiatal hernia with intrathoracic stomach.  Low-attenuation hepatic lesions are technically indeterminate, likely cysts. High-density material in the gallbladder may reflect contrast. Colonic diverticula are seen, without evidence of acute diverticulitis. Likely contrast is seen in the urinary bladder. There are small bilateral fat-containing inguinal hernias. There is liquid appearing stool in the rectum.  The spleen, adrenal glands, kidneys, pancreas, small bowel, and abdominal vasculature are normal as evaluated on this noncontrast examination. No intraperitoneal fluid collection or free gas are seen. No enlarged lymph nodes are demonstrated.  Bone windows demonstrate degenerative changes, without suspicious osseous lesion seen. There is a right hip arthroplasty.      1. Right middle lobe nodule. Optional 1 year follow-up may be obtained obtained if clinically indicated  2. Liquid appearing stool in the rectum, compatible with history of diarrhea  3. Incidental findings as above.  This report was finalized on 2023 11:44 AM by Dr. Hai Abdalla M.D on Workstation: [id number]      CT Abdomen & Pelvis W IV Contrast Without Oral Contrast    Result Date: 2023  REVIEWING YOUR TEST RESULTS IN [employer] IS NOT A SUBSTITUTE FOR DISCUSSING THOSE RESULTS WITH YOUR HEALTH CARE PROVIDER. PLEASE CONTACT YOUR PROVIDER VIA [employer] TO DISCUSS ANY QUESTIONS OR CONCERNS YOU MAY HAVE REGARDING THESE TEST RESULTS.  RADIOLOGY REPORT FACILITY:  [facility] UNIT/AGE/GENDER: M.ED  ER      AGE:83 Y          SEX:M PATIENT NAME/:  JASPER BROWNDREAD    1940 UNIT NUMBER:  [id number] ACCOUNT NUMBER:  [id number] ACCESSION NUMBER:  [id number] ACCESSION NUMBER:  [id number] DATE: 12/11/2023 3:18 PM EXAMINATION: CT abdomen and pelvis with contrast PROVIDED INDICATION: Nausea vomiting and diarrhea COMPARISON: CTA chest PE protocol 5/6/2023 TECHNIQUE: Axial computed tomographic images of the abdomen and pelvis after intravenous administration of 80 mL Omnipaque 350. Oral contrast was not administered. Reformatted images include axial, sagittal, and coronal. CT scans at this facility use dose modulation, iterative reconstruction and/or weight based dosing when appropriate to reduce radiation dose to as low as reasonably achievable. FINDINGS: Liver: Benign-appearing low-attenuation lesion left hepatic lobe, likely a cyst or hemangioma. Biliary: The gallbladder and bile ducts are normal. Pancreas: The pancreas is homogeneous. No main duct dilatation. Spleen: The spleen is normal in size. Adrenal glands: No adrenal mass. Kidneys and ureters: No suspicious renal mass or hydronephrosis. Urinary bladder: Wall thickness of the urinary bladder is normal. Reproductive: Prostatomegaly. Gastrointestinal: There is a stable large hiatal hernia. Colonic diverticulosis. Proximal colon is fluid-filled. Several fluid-filled, mildly prominent loops of small bowel are also visualized. The appendix is normal. Lymph nodes: No lymphadenopathy. Vessels: Calcified vascular disease of the abdominal aorta and iliac arteries. Peritoneum: No fluid collection, free intraperitoneal fluid, or free intraperitoneal air. Lower chest: Partially imaged lung bases are clear. Heart size is normal. No pericardial or pleural effusion. Body wall: Bilateral inguinal hernias, containing fat on the right and short segment of sigmoid colon on the left without obstruction. Containing umbilical hernia. Bones: No acute or aggressive osseous lesion. Multilevel degenerative changes lumbar spine. Chronic compression deformity of L5 vertebral body. Right hip arthroplasty. IMPRESSION: 1. Fluid-filled mildly prominent loops of  small bowel with fluid in the proximal colon, findings favor to represent gastroenteritis. No bowel obstruction. Appendix is normal. 2. Stable large sliding hiatal hernia. 3. Colonic diverticulosis. 4. Prostatomegaly. Dictated by: Farida Abebe M.D. Images and Report reviewed and interpreted by: Farida Abebe M.D. <PS><Electronically signed by: Farida Abebe M.D.> 2023 1605 D: 2023 1600 T: 2023 1600    XR Chest 1 Vw    Result Date: 12/3/2023  REVIEWING YOUR TEST RESULTS IN [facility] IS NOT A SUBSTITUTE FOR DISCUSSING THOSE RESULTS WITH YOUR HEALTH CARE PROVIDER. PLEASE CONTACT YOUR PROVIDER VIA [employer] TO DISCUSS ANY QUESTIONS OR CONCERNS YOU MAY HAVE REGARDING THESE TEST RESULTS.  RADIOLOGY REPORT FACILITY:  [facility] UNIT/AGE/GENDER: M.ED  ER      AGE:83 Y          SEX:M PATIENT NAME/:  JASPER BEST W    1940 UNIT NUMBER:  [id number] ACCOUNT NUMBER:  [id number] ACCESSION NUMBER:  [id number] EXAMINATION: 1 view chest. DATE: 2023 HISTORY: Acute dyspnea, initial encounter COMPARISON: May, 2023. FINDINGS: A single view of the chest demonstrates no pneumonic consolidation pleural effusion or pneumothorax. There is chronic bibasilar atelectasis/scarring. Heart size and pulmonary vascularity are normal. There is a large hiatal hernia. IMPRESSION: No acute abnormalities. Stable appearance of the chest compared to the prior exam. Dictated by: Yamil Roberts M.D. Images and Report reviewed and interpreted by: Yamil Roberts M.D. <PS><Electronically signed by: Yamil Roberts M.D.> 2023 175 D: 2023 175 T: 2023         Assessment & Plan   Assessment / Plan     Brief Patient Summary:  Jasper Best is a 83 y.o. male who is being admitted to the observation unit with gastroenteritis and VETO.  Patient had a CT of the abdomen pelvis with contrast on the  that showed findings favored to represent  gastroenteritis.  CT of the abdomen and pelvis without contrast shows residual contrast with a contrast-enhanced gallbladder, no evidence of bowel obstruction.  There is also an incidental note of a right middle lobe nodule with a optional 1 year follow-up.  Stool culture obtained in the ER.  Patient given IV fluids.  Plan to continue supportive treatment and await stool culture results.  GIs been consulted.    Active Hospital Problems:  Active Hospital Problems    Diagnosis     **Diarrhea      Plan:     Diarrhea  Acute kidney injury  -Patient also reported decreased appetite states been worsening over the past several months  -CT of the abdomen and pelvis with contrast 2 days ago showed evidence of gastroenteritis, CT of the abdomen and pelvis without contrast today shows no evidence of bowel obstruction, some liquid stool in the rectum  -Stool panel PCR pending  -Creatinine at 1.35  -IV fluids  -GI consulted  -Clear liquid diet    Hypertension  -Continue lisinopril  -Monitor vital signs every 4 hours    DVT prophylaxis:  Mechanical DVT prophylaxis orders are present.    CODE STATUS:    Code Status (Patient has no pulse and is not breathing): CPR (Attempt to Resuscitate)  Medical Interventions (Patient has pulse or is breathing): Full Support    Admission Status:  I believe this patient meets observation status.    77 minutes have been spent by [facility] providers in the care of this patient while under observation status.      Appropriate PPE worn during patient encounter.  Hand hygeine performed before and after seeing the patient.      Electronically signed by Selena Carrera PA-C, 12/13/23, 1:46 PM EST.

## 2023-12-13 NOTE — CONSULTS
[facility], [facility]  Initial Inpatient Consult Note    Referring Provider: Dr. Roman    Reason for Consultation: Diarrhea    Subjective     History of present illness:    83 y.o. male w/ PMHx of bladder cancer, HLD, HTN, presented to the ED w/ persistent diarrhea for past 5-6 days. Reports he was having a watery BM every 30minutes-1 hour.  One episode of vomiting 2 days ago, but otherwise no vomiting. Denies abdominal pain, new medications, fever, recent travel, sick contacts, recent abx use. No rectal bleeding. Last colonoscopy over 10 years ago.     He had a BM this afternoon which was somewhat more formed than it has been in the past.     Past Medical History:  Past Medical History:   Diagnosis Date    Anemia     Anxiety     Bladder cancer     Depression     Eating disorder     Hyperlipidemia     Hypertension     Lymphoma      Past Surgical History:  Past Surgical History:   Procedure Laterality Date    COLONOSCOPY      KNEE SURGERY      bakers cyst removal      Social History:   Social History     Tobacco Use    Smoking status: Former     Packs/day: 1.00     Years: 10.00     Additional pack years: 0.00     Total pack years: 10.00     Types: Cigarettes     Quit date: 1971     Years since quittin.0     Passive exposure: Past    Smokeless tobacco: Never   Substance Use Topics    Alcohol use: No      Family History:  Family History   Family history unknown: Yes       Home Meds:  Facility-Administered Medications Prior to Admission   Medication Dose Route Frequency Provider Last Rate Last Admin    cyanocobalamin injection 1,000 mcg  1,000 mcg Intramuscular Q30 Days Ernie Panda MD         Medications Prior to Admission   Medication Sig Dispense Refill Last Dose    FLUoxetine (PROzac) 20 MG capsule Take 1 capsule by mouth Daily. 30 capsule 5 Past Month    lisinopril (PRINIVIL,ZESTRIL) 10 MG tablet Take 1 tablet by mouth Daily. 30 tablet 5 2023     Current Meds:   sodium  chloride, 10 mL, Intravenous, Q12H      Allergies:  No Known Allergies    Objective     Vital Signs  Temp:  [96.7 °F (35.9 °C)] 96.7 °F (35.9 °C)  Heart Rate:  [89-98] 98  Resp:  [16-18] 16  BP: (116-136)/(58-69) 136/65  Physical Exam:  General Appearance:     Alert, cooperative, in no acute distress   Abdomen:     Normal bowel sounds, no masses, no organomegaly, soft     nontender, nondistended, no guarding, no rebound                 tenderness   Rectal:     Deferred       Results Review:   I reviewed the patient's new clinical results.    Results from last 7 days   Lab Units 12/13/23  1008 12/11/23  1316   WBC 10*3/mm3 17.05* 13.36*   HEMOGLOBIN g/dL 13.1 13.7   HEMATOCRIT % 40.0 42.0   PLATELETS 10*3/mm3 255 270     Results from last 7 days   Lab Units 12/13/23  1008   SODIUM mmol/L 136   POTASSIUM mmol/L 3.9   CHLORIDE mmol/L 107   CO2 mmol/L 14.0*   BUN mg/dL 22   CREATININE mg/dL 1.35*   CALCIUM mg/dL 9.0   BILIRUBIN mg/dL 0.3   ALK PHOS U/L 91   ALT (SGPT) U/L 13   AST (SGOT) U/L 13   GLUCOSE mg/dL 109*         Lab Results   Lab Value Date/Time    LIPASE 16 12/13/2023 1008    LIPASE 11 12/11/2023 1316       Radiology:  CT Abdomen Pelvis Without Contrast   Final Result   1. Right middle lobe nodule. Optional 1 year follow-up may be obtained   obtained if clinically indicated       2. Liquid appearing stool in the rectum, compatible with history of   diarrhea       3. Incidental findings as above.       This report was finalized on 12/13/2023 11:44 AM by Dr. Hai Abdlala M.D on Workstation: [id number]                Assessment:   Diarrhea-Outside CT scan 12/11 showing gastroenteritis. GI path PCR and c.diff negative  Leukocytosis   Hepatic lesions- seen on CT scan, likely cysts vs hemangiomas.     Plan:   Suspect infectious etiology, although stool studies negative. His diarrhea consistency improved today.  Will add cholestyramine to see if that helps  Diet as tolerated otherwise  Continue supportive care  w/ IVF, anti-emetics PRN  If leukocytosis improving tomorrow, he can likely be d/c     I discussed the patients findings and my recommendations with patient.         Hailee Chan PA-C  [facility]  [address] [place] [address]  Office: [phone]

## 2023-12-13 NOTE — ED NOTES
"Nursing report ED to floor  Jewel Best  83 y.o.  male    HPI :   Chief Complaint   Patient presents with    Diarrhea       Admitting doctor:   Albert Roman MD    Admitting diagnosis:   The primary encounter diagnosis was Acute kidney injury. A diagnosis of Diarrhea, unspecified type was also pertinent to this visit.    Code status:   Current Code Status       Date Active Code Status Order ID Comments User Context       Prior            Allergies:   Patient has no known allergies.    Isolation:   Contact Spore    Intake and Output  No intake or output data in the 24 hours ending 12/13/23 1330    Weight:       12/13/23  1011   Weight: 72.6 kg (160 lb)       Most recent vitals:   Vitals:    12/13/23 0936 12/13/23 0938 12/13/23 1011   BP: 116/58     Pulse: 96     Resp: 18     Temp:  96.7 °F (35.9 °C)    TempSrc:  Tympanic    SpO2: 100%     Weight:   72.6 kg (160 lb)   Height:   177.8 cm (70\")       Active LDAs/IV Access:   Lines, Drains & Airways       Active LDAs       Name Placement date Placement time Site Days    Peripheral IV 12/13/23 1005 Right Antecubital 12/13/23  1005  Antecubital  less than 1                    Labs (abnormal labs have a star):   Labs Reviewed   COMPREHENSIVE METABOLIC PANEL - Abnormal; Notable for the following components:       Result Value    Glucose 109 (*)     Creatinine 1.35 (*)     CO2 14.0 (*)     eGFR 51.5 (*)     All other components within normal limits    Narrative:     GFR Normal >60  Chronic Kidney Disease <60  Kidney Failure <15    The GFR formula is only valid for adults with stable renal function between ages 18 and 70.   URINALYSIS W/ MICROSCOPIC IF INDICATED (NO CULTURE) - Abnormal; Notable for the following components:    Appearance, UA Cloudy (*)     Ketones, UA Trace (*)     Blood, UA Trace (*)     Protein, UA 30 mg/dL (1+) (*)     All other components within normal limits   CBC WITH AUTO DIFFERENTIAL - Abnormal; Notable for the following components:    WBC 17.05 " (*)     Lymphocyte % 19.2 (*)     Eosinophil % 7.9 (*)     Neutrophils, Absolute 11.27 (*)     Lymphocytes, Absolute 3.27 (*)     Monocytes, Absolute 1.06 (*)     Eosinophils, Absolute 1.35 (*)     Immature Grans, Absolute 0.07 (*)     All other components within normal limits   URINALYSIS, MICROSCOPIC ONLY - Abnormal; Notable for the following components:    Bacteria, UA Trace (*)     All other components within normal limits   GASTROINTESTINAL PANEL, PCR (PREFERRED) DOES NOT INCLUDE CDIFF - Normal    Narrative:     If Aeromonas, Staphylococcus aureus or Bacillus cereus are suspected, please order [id number]: Stool Culture, Aeromonas, S aureus, B Cereus.   CLOSTRIDIOIDES DIFFICILE TOXIN, PCR - Normal    Narrative:     The result indicates the absence of toxigenic C. difficile from stool specimen.    LIPASE - Normal   MAGNESIUM - Normal   CLOSTRIDIOIDES DIFFICILE TOXIN    Narrative:     The following orders were created for panel order Clostridioides difficile Toxin - Stool, Per Rectum.  Procedure                               Abnormality         Status                     ---------                               -----------         ------                     Clostridioides difficile...[[id number]]  Normal              Final result                 Please view results for these tests on the individual orders.   RAINBOW DRAW    Narrative:     The following orders were created for panel order [place] Draw.  Procedure                               Abnormality         Status                     ---------                               -----------         ------                     Green Top (Gel)[[id number]]                                  Final result               Lavender Top[[id number]]                                     Final result               Gold Top - SST[[id number]]                                   Final result               Light Blue Top[[id number]]                                   Final result                  Please view results for these tests on the individual orders.   CBC AND DIFFERENTIAL    Narrative:     The following orders were created for panel order CBC & Differential.  Procedure                               Abnormality         Status                     ---------                               -----------         ------                     CBC Auto Differential[[id number]]        Abnormal            Final result                 Please view results for these tests on the individual orders.   GREEN TOP   LAVENDER TOP   GOLD TOP - SST   LIGHT BLUE TOP       EKG:   No orders to display       Meds given in ED:   Medications   sodium chloride 0.9 % flush 10 mL (has no administration in time range)   sodium chloride 0.9 % bolus 500 mL (0 mL Intravenous Stopped 23 1116)   sodium chloride 0.9 % bolus 500 mL (500 mL Intravenous New Bag 23 1124)       Imaging results:  CT Abdomen Pelvis Without Contrast    Result Date: 2023  1. Right middle lobe nodule. Optional 1 year follow-up may be obtained obtained if clinically indicated  2. Liquid appearing stool in the rectum, compatible with history of diarrhea  3. Incidental findings as above.  This report was finalized on 2023 11:44 AM by Dr. Hai Abdalla M.D on Workstation: [id number]       Ambulatory status:   - assistx1    Social issues:   Social History     Socioeconomic History    Marital status:    Tobacco Use    Smoking status: Former     Packs/day: 1.00     Years: 10.00     Additional pack years: 0.00     Total pack years: 10.00     Types: Cigarettes     Quit date: 1971     Years since quittin.0     Passive exposure: Past    Smokeless tobacco: Never   Vaping Use    Vaping Use: Never used   Substance and Sexual Activity    Alcohol use: No    Drug use: No    Sexual activity: Not Currently     Partners: Male       NIH Stroke Scale:       Abiola Christina RN  23 13:30 EST

## 2023-12-14 ENCOUNTER — READMISSION MANAGEMENT (OUTPATIENT)
Dept: CALL CENTER | Facility: HOSPITAL | Age: 83
End: 2023-12-14
Payer: MEDICARE

## 2023-12-14 VITALS
SYSTOLIC BLOOD PRESSURE: 139 MMHG | TEMPERATURE: 97.7 F | HEART RATE: 84 BPM | OXYGEN SATURATION: 96 % | HEIGHT: 70 IN | RESPIRATION RATE: 18 BRPM | WEIGHT: 165.2 LBS | BODY MASS INDEX: 23.65 KG/M2 | DIASTOLIC BLOOD PRESSURE: 60 MMHG

## 2023-12-14 LAB
ANION GAP SERPL CALCULATED.3IONS-SCNC: 10.5 MMOL/L (ref 5–15)
BUN SERPL-MCNC: 15 MG/DL (ref 8–23)
BUN/CREAT SERPL: 14.3 (ref 7–25)
CALCIUM SPEC-SCNC: 8 MG/DL (ref 8.6–10.5)
CHLORIDE SERPL-SCNC: 110 MMOL/L (ref 98–107)
CO2 SERPL-SCNC: 16.5 MMOL/L (ref 22–29)
CREAT SERPL-MCNC: 1.05 MG/DL (ref 0.76–1.27)
DEPRECATED RDW RBC AUTO: 42.8 FL (ref 37–54)
EGFRCR SERPLBLD CKD-EPI 2021: 69.6 ML/MIN/1.73
ERYTHROCYTE [DISTWIDTH] IN BLOOD BY AUTOMATED COUNT: 13.2 % (ref 12.3–15.4)
GLUCOSE SERPL-MCNC: 90 MG/DL (ref 65–99)
HCT VFR BLD AUTO: 33.3 % (ref 37.5–51)
HGB BLD-MCNC: 10.8 G/DL (ref 13–17.7)
MCH RBC QN AUTO: 28.8 PG (ref 26.6–33)
MCHC RBC AUTO-ENTMCNC: 32.4 G/DL (ref 31.5–35.7)
MCV RBC AUTO: 88.8 FL (ref 79–97)
PLATELET # BLD AUTO: 296 10*3/MM3 (ref 140–450)
PMV BLD AUTO: 10.6 FL (ref 6–12)
POTASSIUM SERPL-SCNC: 3.6 MMOL/L (ref 3.5–5.2)
RBC # BLD AUTO: 3.75 10*6/MM3 (ref 4.14–5.8)
SODIUM SERPL-SCNC: 137 MMOL/L (ref 136–145)
WBC NRBC COR # BLD AUTO: 10.25 10*3/MM3 (ref 3.4–10.8)

## 2023-12-14 PROCEDURE — 85027 COMPLETE CBC AUTOMATED: CPT | Performed by: STUDENT IN AN ORGANIZED HEALTH CARE EDUCATION/TRAINING PROGRAM

## 2023-12-14 PROCEDURE — 80048 BASIC METABOLIC PNL TOTAL CA: CPT | Performed by: STUDENT IN AN ORGANIZED HEALTH CARE EDUCATION/TRAINING PROGRAM

## 2023-12-14 PROCEDURE — G0378 HOSPITAL OBSERVATION PER HR: HCPCS

## 2023-12-14 RX ORDER — CHOLESTYRAMINE 4 G/9G
1 POWDER, FOR SUSPENSION ORAL 2 TIMES DAILY PRN
Qty: 60 EACH | Refills: 99 | Status: SHIPPED | OUTPATIENT
Start: 2023-12-14

## 2023-12-14 NOTE — PLAN OF CARE
Goal Outcome Evaluation:      Pt d/c via private vehicle, IV removed. Belongings returned. Follow up instructions given. No further questions/complaints at this time. Pt will take lift home. CCP involved.

## 2023-12-14 NOTE — PROGRESS NOTES
.ED OBSERVATION PROGRESS/DISCHARGE SUMMARY    Date of Admission: 12/13/2023   LOS: 0 days   PCP: Ernie Panda MD    Subjective  Resting comfortably and in no acute distress.  Patient reports that he has had no diarrhea throughout the night and denies abdominal pain, nausea or vomiting.    Hospital Outcome:   83-year-old male was seen and examined at bedside following admission to the observation unit due to persistent diarrhea for the past 5/6 days.  ED workup shows creatinine 1.35, WBC 17.05 and GI panel negative.  CT abdomen pelvis without shows liquid appearing stool in the rectum and right middle lobe nodule and recommend 1 year follow-up for further evaluation.  Gastroenterology evaluated patient and has added cholestyramine, recommended to advance diet as tolerated, to continue supportive care with IVF and antiemetic as needed.    Repeat CBC this morning shows that leukocytosis has resolved with current WBC of 10.  This morning he reports that he is feeling much better.  He was seen and evaluated Dr. Dueñas with gastroenterology service and cleared for discharge home with outpatient follow-up.  All questions answered.      .    ROS:  General: no fevers, chills  Respiratory: no cough, dyspnea  Cardiovascular: no chest pain, palpitations  Abdomen: No abdominal pain, nausea, vomiting, or diarrhea  Neurologic: No focal weakness    Objective   Physical Exam:  I have reviewed the vital signs.  Temp:  [96.7 °F (35.9 °C)-99.1 °F (37.3 °C)] 99.1 °F (37.3 °C)  Heart Rate:  [89-98] 96  Resp:  [14-18] 16  BP: ()/(58-70) 91/61  General Appearance:    Alert, cooperative, no distress  Head:    Normocephalic, atraumatic  Eyes:    Sclerae anicteric  Neck:   Supple, no mass  Lungs: Clear to auscultation bilaterally, respirations unlabored  Heart: Regular rate and rhythm, S1 and S2 normal, no murmur, rub or gallop  Abdomen:  Soft, non-tender, bowel sounds active, nondistended  Extremities: No clubbing,  cyanosis, or edema to lower extremities  Pulses:  2+ and symmetric in distal lower extremities  Skin: No rashes   Neurologic: Oriented x3, Normal strength to extremities    Results Review:    I have reviewed the labs, radiology results and diagnostic studies.    Results from last 7 days   Lab Units 12/13/23  1008   WBC 10*3/mm3 17.05*   HEMOGLOBIN g/dL 13.1   HEMATOCRIT % 40.0   PLATELETS 10*3/mm3 255     Results from last 7 days   Lab Units 12/13/23  2113 12/13/23  1008   SODIUM mmol/L 139 136   POTASSIUM mmol/L 3.9 3.9   CHLORIDE mmol/L 108* 107   CO2 mmol/L 18.0* 14.0*   BUN mg/dL 18 22   CREATININE mg/dL 1.27 1.35*   CALCIUM mg/dL 8.5* 9.0   BILIRUBIN mg/dL  --  0.3   ALK PHOS U/L  --  91   ALT (SGPT) U/L  --  13   AST (SGOT) U/L  --  13   GLUCOSE mg/dL 138* 109*     Imaging Results (Last 24 Hours)       Procedure Component Value Units Date/Time    CT Abdomen Pelvis Without Contrast [[id number]] Collected: 12/13/23 1140     Updated: 12/13/23 1147    Narrative:      Examination: CT of the abdomen and pelvis without contrast     Technique: CT of the abdomen and pelvis without contrast per protocol.  Radiation dose reduction techniques were utilized, including automated  exposure control and exposure modulation based on body size.        History: Abdominal pain, diarrhea, acute renal insufficiency, and  leukocytosis     Comparison: None available     Findings: Limited evaluation of the inferior thorax demonstrates a right  middle lobe pleural-based pulmonary nodule measuring 5 mm on series 2,  image 7. Atelectasis and scarring are seen at the bases, without  consolidation, pleural effusion, or pneumothorax. The heart is normal in  size, without pericardial effusion. Coronary artery and mitral annular  calcifications are seen. There is a large sliding-type hiatal hernia  with intrathoracic stomach.     Low-attenuation hepatic lesions are technically indeterminate, likely  cysts. High-density material in the  gallbladder may reflect contrast.  Colonic diverticula are seen, without evidence of acute diverticulitis.  Likely contrast is seen in the urinary bladder. There are small  bilateral fat-containing inguinal hernias. There is liquid appearing  stool in the rectum.     The spleen, adrenal glands, kidneys, pancreas, small bowel, and  abdominal vasculature are normal as evaluated on this noncontrast  examination. No intraperitoneal fluid collection or free gas are seen.  No enlarged lymph nodes are demonstrated.     Bone windows demonstrate degenerative changes, without suspicious  osseous lesion seen. There is a right hip arthroplasty.       Impression:      1. Right middle lobe nodule. Optional 1 year follow-up may be obtained  obtained if clinically indicated     2. Liquid appearing stool in the rectum, compatible with history of  diarrhea     3. Incidental findings as above.     This report was finalized on 12/13/2023 11:44 AM by Dr. Hai Abdalla M.D on Workstation: [id number]               I have reviewed the medications.  ---------------------------------------------------------------------------------------------  Assessment & Plan   Assessment/Problem List    Diarrhea      Plan:    Diarrhea  Acute kidney injury  -Patient also reported decreased appetite states been worsening over the past several months  -CT of the abdomen and pelvis with contrast 2 days ago showed evidence of gastroenteritis, CT of the abdomen and pelvis without contrast today shows no evidence of bowel obstruction, some liquid stool in the rectum  -Stool panel PCR and C. difficile negative  -Creatinine at 1.35 yesterday improved to 1.05 this morning following gentle IV hydration overnight  -GI consulted, cleared for discharge home  -Clear liquid diet advance as tolerated     Hypertension  -Continue lisinopril    Disposition: Home    Follow-up after Discharge: PCP    This note will serve as a discharge summary    NORMA Alberts  12/14/23 00:41 EST    I have worn appropriate PPE during this patient encounter, sanitized my hands both with entering and exiting patient's room.      41 minutes has been spent by [facility] providers in the care of this patient while under observation status

## 2023-12-14 NOTE — OUTREACH NOTE
Prep Survey      Flowsheet Row Responses   [facility] facility patient discharged from? [place]   Is LACE score < 7 ? Yes   Eligibility [facility]   Date of Admission 12/13/23   Date of Discharge 12/14/23   Discharge Disposition Home or Self Care   Discharge diagnosis diarrhea, gastroenteritis   Does the patient have one of the following disease processes/diagnoses(primary or secondary)? Other   Does the patient have Home health ordered? No   Is there a DME ordered? No   Prep survey completed? Yes            Elodia JOYNER - Registered Nurse

## 2023-12-14 NOTE — PROGRESS NOTES
[facility]  Inpatient Progress Note    Reason for Follow Up: Nonbloody diarrhea    Subjective     Interval History:   He is only had 1 nonbloody diarrheal bowel movement today.  His last colonoscopy was 40 years ago.  His hemoglobin is 10.8 today.  He wants to go home.    Current Facility-Administered Medications:     cholestyramine (QUESTRAN) packet 1 packet, 1 packet, Oral, Q12H, Hailee Chan PA-C    FLUoxetine (PROzac) capsule 20 mg, 20 mg, Oral, Daily, Deandre, Selena R, PA-C, 20 mg at 12/13/23 1649    lactated ringers infusion, 100 mL/hr, Intravenous, Continuous, Deandre, Selena R, PA-C, Last Rate: 100 mL/hr at 12/13/23 1649, 100 mL/hr at 12/13/23 1649    lisinopril (PRINIVIL,ZESTRIL) tablet 10 mg, 10 mg, Oral, Daily, Deandre, Selena R, PA-C, 10 mg at 12/13/23 1649    melatonin tablet 5 mg, 5 mg, Oral, Nightly PRN, Deandre, Selena R, PA-C    ondansetron (ZOFRAN) tablet 4 mg, 4 mg, Oral, Q6H PRN **OR** ondansetron (ZOFRAN) injection 4 mg, 4 mg, Intravenous, Q6H PRN, Deandre, Selena R, PA-C    sodium chloride 0.9 % flush 10 mL, 10 mL, Intravenous, PRN, Jonelle, Maritza, APRN    sodium chloride 0.9 % flush 10 mL, 10 mL, Intravenous, Q12H, Deandre, Selena R, PA-C, 10 mL at 12/13/23 1435    sodium chloride 0.9 % flush 10 mL, 10 mL, Intravenous, PRN, Deandre, Selena R, PA-C    sodium chloride 0.9 % infusion 40 mL, 40 mL, Intravenous, PRN, Deandre, Selena R, PA-C  Review of Systems:    The following systems were reviewed and negative;  respiratory, cardiovascular, musculoskeletal, and neurological    Objective     Vital Signs  Temp:  [96.7 °F (35.9 °C)-99.1 °F (37.3 °C)] 97.7 °F (36.5 °C)  Heart Rate:  [77-98] 84  Resp:  [14-18] 18  BP: ()/(55-70) 139/60  Body mass index is 23.7 kg/m².  No intake or output data in the 24 hours ending 12/14/23 0905  No intake/output data recorded.     Physical Exam:   General: patient awake, alert and cooperative   Eyes: Normal  lids and lashes, no scleral icterus   Neck: supple, normal ROM   Skin: warm and dry, not jaundiced   Cardiovascular: regular rhythm and rate, no murmurs auscultated   Pulm: clear to auscultation bilaterally, regular and unlabored   Abdomen: soft, nontender, nondistended; normal bowel sounds   Extremities: no rash or edema   Psychiatric: Normal mood and behavior; memory intact     Results Review:     I reviewed the patient's new clinical results.    Results from last 7 days   Lab Units 12/14/23  0411 12/13/23  1008 12/11/23  1316   WBC 10*3/mm3 10.25 17.05* 13.36*   HEMOGLOBIN g/dL 10.8* 13.1 13.7   HEMATOCRIT % 33.3* 40.0 42.0   PLATELETS 10*3/mm3 296 255 270     Results from last 7 days   Lab Units 12/14/23  0411 12/13/23  2113 12/13/23  1008   SODIUM mmol/L 137 139 136   POTASSIUM mmol/L 3.6 3.9 3.9   CHLORIDE mmol/L 110* 108* 107   CO2 mmol/L 16.5* 18.0* 14.0*   BUN mg/dL 15 18 22   CREATININE mg/dL 1.05 1.27 1.35*   CALCIUM mg/dL 8.0* 8.5* 9.0   BILIRUBIN mg/dL  --   --  0.3   ALK PHOS U/L  --   --  91   ALT (SGPT) U/L  --   --  13   AST (SGOT) U/L  --   --  13   GLUCOSE mg/dL 90 138* 109*         Lab Results   Lab Value Date/Time    LIPASE 16 12/13/2023 1008    LIPASE 11 12/11/2023 1316       Radiology:  CT Abdomen Pelvis Without Contrast   Final Result   1. Right middle lobe nodule. Optional 1 year follow-up may be obtained   obtained if clinically indicated       2. Liquid appearing stool in the rectum, compatible with history of   diarrhea       3. Incidental findings as above.       This report was finalized on 12/13/2023 11:44 AM by Dr. Hai Abdalla M.D on Workstation: [id number]              Assessment & Plan     Active Hospital Problems    Diagnosis     **Diarrhea        Assessment/Recommendations:    Diarrhea-Outside CT scan 12/11 showing gastroenteritis. GI path PCR and c.diff negative.  He is better.  I suspect that this is infectious enteritis.  I told him that he could go home and take as  needed Imodium.  He can follow-up with me as an outpatient.  Leukocytosis -resolved this morning    I discussed the patients findings and my recommendations with patient and primary care team.    Donis Blackburn MD

## 2023-12-14 NOTE — NURSING NOTE
Pt states he is ready to go home. He reports 1 episode of diarrhea since admission. Pt is alert and oriented x4 up ad reba. Pt is unsure if he wants to wait for GI.

## 2023-12-14 NOTE — PROGRESS NOTES
"NADER MOSES Attestation Note    I supervised care provided by the midlevel provider.    The NAINA and I have discussed this patient's history, physical exam, and treatment plan. I have reviewed the documentation and personally had a face to face interaction with the patient  I affirm the documentation and agree with the treatment and plan. I provided a substantive portion of the care of this patient.  I personally performed the physical exam, in its entirety.  My personal findings are documented in below:    History:  Older patient lives alone complaint of persistent diarrhea for the past several days.  Tells me he was passing \"straight water\" per rectum.  Denies blood.  Denies pain.  Denies vomiting.  No known sick contacts.  No recent antibiotic use.  Found to have VETO and workup today.    Physical Exam:  General: No acute distress.  Sitting at the side of the bed, talkative  HENT: NCAT, PERRL, Nares patent.  Eyes: no scleral icterus.  Neck: trachea midline, no ROM limitations.  CV: Pink warm and well-perfused throughout  Respiratory: No distress or increased work of breathing  Abdomen: soft, no focal tenderness or rigidity, no masses  Musculoskeletal: no deformity.  Neuro: alert, moves all extremities, follows commands.  Skin: warm, dry.    Assessment and Plan:  Diarrhea with acute kidney injury: IV fluid rehydration.  Encourage p.o. fluids as well.  Stool PCR completed and nondiagnostic for infectious etiology  Recheck BMP following IV fluid therapy    Hypertension: Continue home meds, monitor blood pressure    "

## 2023-12-15 ENCOUNTER — TRANSITIONAL CARE MANAGEMENT TELEPHONE ENCOUNTER (OUTPATIENT)
Dept: CALL CENTER | Facility: HOSPITAL | Age: 83
End: 2023-12-15
Payer: MEDICARE

## 2023-12-15 NOTE — OUTREACH NOTE
Call Center TCM Note      Flowsheet Row Responses   [facility] patient discharged from? [place]   Does the patient have one of the following disease processes/diagnoses(primary or secondary)? Other   TCM attempt successful? Yes   Call start time 1452   Call end time 1500   Discharge diagnosis diarrhea, gastroenteritis   Person spoke with today (if not patient) and relationship pt   Meds reviewed with patient/caregiver? Yes   Is the patient having any side effects they believe may be caused by any medication additions or changes? No   Does the patient have all medications ordered at discharge? Yes   Is the patient taking all medications as directed (includes completed medication regime)? Yes   Comments Pt advised to make a GI fu appt   Does the patient have an appointment with their PCP within 7-14 days of discharge? No   Nursing Interventions Routed TCM call to PCP office   Psychosocial issues? No   Did the patient receive a copy of their discharge instructions? Yes   Nursing interventions Reviewed instructions with patient   What is the patient's perception of their health status since discharge? Improving   Is the patient/caregiver able to teach back signs and symptoms related to disease process for when to call PCP? Yes   Is the patient/caregiver able to teach back signs and symptoms related to disease process for when to call 911? Yes   Is the patient/caregiver able to teach back the hierarchy of who to call/visit for symptoms/problems? PCP, Specialist, Home health nurse, Urgent Care, ED, 911 Yes   If the patient is a current smoker, are they able to teach back resources for cessation? Not a smoker   TCM call completed? Yes   Wrap up additional comments Pt states he is still having diarrhea, and shares he has not picked up choestyramine. Reviewed/educated pt on cholestyramine. Pt verbalized understanding. Pt advised to make a GI fu appt. RN will send a message to PCP office as pt does not have an appt  that satisfy TCM.   Call end time 1500   Would this patient benefit from a Referral to [facility] Social Work? No   Is the patient interested in additional calls from an ambulatory ? No            Jael Ackerman RN    12/15/2023, 15:02 EST

## 2023-12-17 ENCOUNTER — HOSPITAL ENCOUNTER (INPATIENT)
Facility: HOSPITAL | Age: 83
LOS: 6 days | Discharge: HOME | DRG: 391 | End: 2023-12-24
Attending: EMERGENCY MEDICINE | Admitting: INTERNAL MEDICINE
Payer: MEDICARE

## 2023-12-17 DIAGNOSIS — R19.7 DIARRHEA, UNSPECIFIED TYPE: ICD-10-CM

## 2023-12-17 DIAGNOSIS — R19.7 NAUSEA VOMITING AND DIARRHEA: Primary | ICD-10-CM

## 2023-12-17 DIAGNOSIS — E87.6 HYPOKALEMIA: ICD-10-CM

## 2023-12-17 DIAGNOSIS — R63.0 POOR APPETITE: ICD-10-CM

## 2023-12-17 DIAGNOSIS — E43 SEVERE MALNUTRITION: ICD-10-CM

## 2023-12-17 DIAGNOSIS — R62.7 FAILURE TO THRIVE IN ADULT: ICD-10-CM

## 2023-12-17 DIAGNOSIS — R11.2 NAUSEA VOMITING AND DIARRHEA: Primary | ICD-10-CM

## 2023-12-17 DIAGNOSIS — D72.829 LEUKOCYTOSIS, UNSPECIFIED TYPE: ICD-10-CM

## 2023-12-17 DIAGNOSIS — A08.31 GASTROENTERITIS DUE TO SAPOVIRUS: ICD-10-CM

## 2023-12-17 LAB
ALBUMIN SERPL-MCNC: 3.7 G/DL (ref 3.5–5.2)
ALBUMIN/GLOB SERPL: 1.7 G/DL
ALP SERPL-CCNC: 88 U/L (ref 39–117)
ALT SERPL W P-5'-P-CCNC: 11 U/L (ref 1–41)
ANION GAP SERPL CALCULATED.3IONS-SCNC: 14 MMOL/L (ref 5–15)
AST SERPL-CCNC: 6 U/L (ref 1–40)
BASOPHILS # BLD AUTO: 0.03 10*3/MM3 (ref 0–0.2)
BASOPHILS NFR BLD AUTO: 0.2 % (ref 0–1.5)
BILIRUB SERPL-MCNC: 0.2 MG/DL (ref 0–1.2)
BILIRUB UR QL STRIP: NEGATIVE
BUN SERPL-MCNC: 15 MG/DL (ref 8–23)
BUN/CREAT SERPL: 13.8 (ref 7–25)
CALCIUM SPEC-SCNC: 8.3 MG/DL (ref 8.6–10.5)
CHLORIDE SERPL-SCNC: 105 MMOL/L (ref 98–107)
CLARITY UR: CLEAR
CO2 SERPL-SCNC: 18 MMOL/L (ref 22–29)
COLOR UR: YELLOW
CREAT SERPL-MCNC: 1.09 MG/DL (ref 0.76–1.27)
D-LACTATE SERPL-SCNC: 1.3 MMOL/L (ref 0.5–2)
DEPRECATED RDW RBC AUTO: 44.3 FL (ref 37–54)
EGFRCR SERPLBLD CKD-EPI 2021: 66.5 ML/MIN/1.73
EOSINOPHIL # BLD AUTO: 1.77 10*3/MM3 (ref 0–0.4)
EOSINOPHIL NFR BLD AUTO: 9.3 % (ref 0.3–6.2)
ERYTHROCYTE [DISTWIDTH] IN BLOOD BY AUTOMATED COUNT: 13.7 % (ref 12.3–15.4)
GLOBULIN UR ELPH-MCNC: 2.2 GM/DL
GLUCOSE SERPL-MCNC: 99 MG/DL (ref 65–99)
GLUCOSE UR STRIP-MCNC: NEGATIVE MG/DL
HCT VFR BLD AUTO: 40.4 % (ref 37.5–51)
HGB BLD-MCNC: 14 G/DL (ref 13–17.7)
HGB UR QL STRIP.AUTO: NEGATIVE
IMM GRANULOCYTES # BLD AUTO: 0.21 10*3/MM3 (ref 0–0.05)
IMM GRANULOCYTES NFR BLD AUTO: 1.1 % (ref 0–0.5)
KETONES UR QL STRIP: ABNORMAL
LEUKOCYTE ESTERASE UR QL STRIP.AUTO: NEGATIVE
LYMPHOCYTES # BLD AUTO: 3.11 10*3/MM3 (ref 0.7–3.1)
LYMPHOCYTES NFR BLD AUTO: 16.4 % (ref 19.6–45.3)
MAGNESIUM SERPL-MCNC: 1.7 MG/DL (ref 1.6–2.4)
MCH RBC QN AUTO: 30.6 PG (ref 26.6–33)
MCHC RBC AUTO-ENTMCNC: 34.7 G/DL (ref 31.5–35.7)
MCV RBC AUTO: 88.2 FL (ref 79–97)
MONOCYTES # BLD AUTO: 0.71 10*3/MM3 (ref 0.1–0.9)
MONOCYTES NFR BLD AUTO: 3.7 % (ref 5–12)
NEUTROPHILS NFR BLD AUTO: 13.15 10*3/MM3 (ref 1.7–7)
NEUTROPHILS NFR BLD AUTO: 69.3 % (ref 42.7–76)
NITRITE UR QL STRIP: NEGATIVE
NRBC BLD AUTO-RTO: 0 /100 WBC (ref 0–0.2)
PH UR STRIP.AUTO: 5.5 [PH] (ref 5–8)
PLATELET # BLD AUTO: 333 10*3/MM3 (ref 140–450)
PMV BLD AUTO: 9.9 FL (ref 6–12)
POTASSIUM SERPL-SCNC: 3.1 MMOL/L (ref 3.5–5.2)
PROCALCITONIN SERPL-MCNC: 0.13 NG/ML (ref 0–0.25)
PROT SERPL-MCNC: 5.9 G/DL (ref 6–8.5)
PROT UR QL STRIP: ABNORMAL
RBC # BLD AUTO: 4.58 10*6/MM3 (ref 4.14–5.8)
SODIUM SERPL-SCNC: 137 MMOL/L (ref 136–145)
SP GR UR STRIP: 1.02 (ref 1–1.03)
UROBILINOGEN UR QL STRIP: ABNORMAL
WBC NRBC COR # BLD AUTO: 18.98 10*3/MM3 (ref 3.4–10.8)

## 2023-12-17 PROCEDURE — G0378 HOSPITAL OBSERVATION PER HR: HCPCS

## 2023-12-17 PROCEDURE — 99214 OFFICE O/P EST MOD 30 MIN: CPT | Performed by: INTERNAL MEDICINE

## 2023-12-17 PROCEDURE — 87046 STOOL CULTR AEROBIC BACT EA: CPT | Performed by: NURSE PRACTITIONER

## 2023-12-17 PROCEDURE — 80053 COMPREHEN METABOLIC PANEL: CPT | Performed by: EMERGENCY MEDICINE

## 2023-12-17 PROCEDURE — 81003 URINALYSIS AUTO W/O SCOPE: CPT | Performed by: EMERGENCY MEDICINE

## 2023-12-17 PROCEDURE — 25010000002 ONDANSETRON PER 1 MG: Performed by: EMERGENCY MEDICINE

## 2023-12-17 PROCEDURE — 87040 BLOOD CULTURE FOR BACTERIA: CPT | Performed by: EMERGENCY MEDICINE

## 2023-12-17 PROCEDURE — 25010000002 ONDANSETRON PER 1 MG: Performed by: INTERNAL MEDICINE

## 2023-12-17 PROCEDURE — 87427 SHIGA-LIKE TOXIN AG IA: CPT | Performed by: NURSE PRACTITIONER

## 2023-12-17 PROCEDURE — 25810000003 SODIUM CHLORIDE 0.9 % SOLUTION: Performed by: INTERNAL MEDICINE

## 2023-12-17 PROCEDURE — 83993 ASSAY FOR CALPROTECTIN FECAL: CPT | Performed by: INTERNAL MEDICINE

## 2023-12-17 PROCEDURE — 87045 FECES CULTURE AEROBIC BACT: CPT | Performed by: NURSE PRACTITIONER

## 2023-12-17 PROCEDURE — 87507 IADNA-DNA/RNA PROBE TQ 12-25: CPT | Performed by: INTERNAL MEDICINE

## 2023-12-17 PROCEDURE — 83605 ASSAY OF LACTIC ACID: CPT | Performed by: EMERGENCY MEDICINE

## 2023-12-17 PROCEDURE — 99285 EMERGENCY DEPT VISIT HI MDM: CPT

## 2023-12-17 PROCEDURE — 36415 COLL VENOUS BLD VENIPUNCTURE: CPT

## 2023-12-17 PROCEDURE — 84145 PROCALCITONIN (PCT): CPT | Performed by: EMERGENCY MEDICINE

## 2023-12-17 PROCEDURE — 85025 COMPLETE CBC W/AUTO DIFF WBC: CPT | Performed by: EMERGENCY MEDICINE

## 2023-12-17 PROCEDURE — 25810000003 SODIUM CHLORIDE 0.9 % SOLUTION: Performed by: EMERGENCY MEDICINE

## 2023-12-17 PROCEDURE — 25010000002 POTASSIUM CHLORIDE 10 MEQ/100ML SOLUTION: Performed by: EMERGENCY MEDICINE

## 2023-12-17 PROCEDURE — 83735 ASSAY OF MAGNESIUM: CPT | Performed by: NURSE PRACTITIONER

## 2023-12-17 PROCEDURE — 87493 C DIFF AMPLIFIED PROBE: CPT | Performed by: NURSE PRACTITIONER

## 2023-12-17 RX ORDER — ONDANSETRON 2 MG/ML
4 INJECTION INTRAMUSCULAR; INTRAVENOUS ONCE
Status: COMPLETED | OUTPATIENT
Start: 2023-12-17 | End: 2023-12-17

## 2023-12-17 RX ORDER — SODIUM CHLORIDE 9 MG/ML
100 INJECTION, SOLUTION INTRAVENOUS CONTINUOUS
Status: DISCONTINUED | OUTPATIENT
Start: 2023-12-17 | End: 2023-12-18

## 2023-12-17 RX ORDER — SODIUM CHLORIDE 9 MG/ML
40 INJECTION, SOLUTION INTRAVENOUS AS NEEDED
Status: DISCONTINUED | OUTPATIENT
Start: 2023-12-17 | End: 2023-12-25 | Stop reason: HOSPADM

## 2023-12-17 RX ORDER — BISACODYL 10 MG
10 SUPPOSITORY, RECTAL RECTAL DAILY PRN
Status: DISCONTINUED | OUTPATIENT
Start: 2023-12-17 | End: 2023-12-17

## 2023-12-17 RX ORDER — BISACODYL 5 MG/1
5 TABLET, DELAYED RELEASE ORAL DAILY PRN
Status: DISCONTINUED | OUTPATIENT
Start: 2023-12-17 | End: 2023-12-17

## 2023-12-17 RX ORDER — LOPERAMIDE HYDROCHLORIDE 2 MG/1
4 CAPSULE ORAL ONCE
Status: COMPLETED | OUTPATIENT
Start: 2023-12-17 | End: 2023-12-17

## 2023-12-17 RX ORDER — ONDANSETRON 2 MG/ML
4 INJECTION INTRAMUSCULAR; INTRAVENOUS EVERY 6 HOURS PRN
Status: DISCONTINUED | OUTPATIENT
Start: 2023-12-17 | End: 2023-12-25 | Stop reason: HOSPADM

## 2023-12-17 RX ORDER — POTASSIUM CHLORIDE 7.45 MG/ML
10 INJECTION INTRAVENOUS ONCE
Status: COMPLETED | OUTPATIENT
Start: 2023-12-17 | End: 2023-12-17

## 2023-12-17 RX ORDER — SODIUM CHLORIDE 0.9 % (FLUSH) 0.9 %
10 SYRINGE (ML) INJECTION EVERY 12 HOURS SCHEDULED
Status: DISCONTINUED | OUTPATIENT
Start: 2023-12-17 | End: 2023-12-25 | Stop reason: HOSPADM

## 2023-12-17 RX ORDER — AMOXICILLIN 250 MG
2 CAPSULE ORAL 2 TIMES DAILY
Status: DISCONTINUED | OUTPATIENT
Start: 2023-12-17 | End: 2023-12-17

## 2023-12-17 RX ORDER — SODIUM CHLORIDE 0.9 % (FLUSH) 0.9 %
10 SYRINGE (ML) INJECTION AS NEEDED
Status: DISCONTINUED | OUTPATIENT
Start: 2023-12-17 | End: 2023-12-25 | Stop reason: HOSPADM

## 2023-12-17 RX ORDER — POLYETHYLENE GLYCOL 3350 17 G/17G
17 POWDER, FOR SOLUTION ORAL DAILY PRN
Status: DISCONTINUED | OUTPATIENT
Start: 2023-12-17 | End: 2023-12-17

## 2023-12-17 RX ADMIN — SODIUM CHLORIDE 100 ML/HR: 9 INJECTION, SOLUTION INTRAVENOUS at 18:14

## 2023-12-17 RX ADMIN — ONDANSETRON 4 MG: 2 INJECTION INTRAMUSCULAR; INTRAVENOUS at 04:30

## 2023-12-17 RX ADMIN — Medication 10 ML: at 15:10

## 2023-12-17 RX ADMIN — SODIUM CHLORIDE 100 ML/HR: 9 INJECTION, SOLUTION INTRAVENOUS at 10:47

## 2023-12-17 RX ADMIN — ONDANSETRON 4 MG: 2 INJECTION INTRAMUSCULAR; INTRAVENOUS at 18:14

## 2023-12-17 RX ADMIN — LOPERAMIDE HYDROCHLORIDE 4 MG: 2 CAPSULE ORAL at 06:37

## 2023-12-17 RX ADMIN — POTASSIUM CHLORIDE 10 MEQ: 7.46 INJECTION, SOLUTION INTRAVENOUS at 06:37

## 2023-12-17 RX ADMIN — SODIUM CHLORIDE 500 ML: 9 INJECTION, SOLUTION INTRAVENOUS at 04:29

## 2023-12-17 RX ADMIN — Medication 10 ML: at 20:10

## 2023-12-17 NOTE — Clinical Note
Level of Care: Med/Surg [1]   Diagnosis: Diarrhea [787.91.ICD-9-CM]   Admitting Physician: MANSI RAUSCH [[id number]]

## 2023-12-17 NOTE — ED NOTES
"Pt arrives from home via EMS.    EMS states \"Diarrhea, N&V for 36 hours seen here 4 days for frequent urination.\"    Pt arrives ambulatory from EMS.     "

## 2023-12-17 NOTE — ED NOTES
Nursing report ED to floor  Jewel Best  83 y.o.  male    HPI :   Chief Complaint   Patient presents with    Diarrhea       Admitting doctor:   Brannon Holder MD    Admitting diagnosis:   The primary encounter diagnosis was Nausea vomiting and diarrhea. Diagnoses of Leukocytosis, unspecified type, Poor appetite, Failure to thrive in adult, and Hypokalemia were also pertinent to this visit.    Code status:   Current Code Status       Date Active Code Status Order ID Comments User Context       12/17/2023 1037 CPR (Attempt to Resuscitate) [id number]  Brannon Holder MD ED        Question Answer    Code Status (Patient has no pulse and is not breathing) CPR (Attempt to Resuscitate)    Medical Interventions (Patient has pulse or is breathing) Full Support                    Allergies:   Patient has no known allergies.    Isolation:   Contact Spore    Intake and Output    Intake/Output Summary (Last 24 hours) at 12/17/2023 1212  Last data filed at 12/17/2023 0747  Gross per 24 hour   Intake 600 ml   Output --   Net 600 ml       Weight:       12/17/23  0404   Weight: 74.8 kg (165 lb)       Most recent vitals:   Vitals:    12/17/23 0639 12/17/23 0801 12/17/23 0901 12/17/23 1101   BP: 123/65 100/53 104/60 105/57   BP Location:       Patient Position:   Lying Lying   Pulse: 94 86 90 86   Resp: 16 18 18 18   Temp:       TempSrc:       SpO2: 100% 95% 96% 94%   Weight:       Height:           Active LDAs/IV Access:   Lines, Drains & Airways       Active LDAs       Name Placement date Placement time Site Days    Peripheral IV 12/17/23 0425 Distal;Posterior;Right Forearm 12/17/23 0425  Forearm  less than 1                    Labs (abnormal labs have a star):   Labs Reviewed   COMPREHENSIVE METABOLIC PANEL - Abnormal; Notable for the following components:       Result Value    Potassium 3.1 (*)     CO2 18.0 (*)     Calcium 8.3 (*)     Total Protein 5.9 (*)     All other components within normal limits    Narrative:     GFR  "Normal >60  Chronic Kidney Disease <60  Kidney Failure <15    The GFR formula is only valid for adults with stable renal function between ages 18 and 70.   URINALYSIS W/ MICROSCOPIC IF INDICATED (NO CULTURE) - Abnormal; Notable for the following components:    Ketones, UA 15 mg/dL (1+) (*)     Protein, UA Trace (*)     All other components within normal limits    Narrative:     Urine microscopic not indicated.   CBC WITH AUTO DIFFERENTIAL - Abnormal; Notable for the following components:    WBC 18.98 (*)     Lymphocyte % 16.4 (*)     Monocyte % 3.7 (*)     Eosinophil % 9.3 (*)     Immature Grans % 1.1 (*)     Neutrophils, Absolute 13.15 (*)     Lymphocytes, Absolute 3.11 (*)     Eosinophils, Absolute 1.77 (*)     Immature Grans, Absolute 0.21 (*)     All other components within normal limits   LACTIC ACID, PLASMA - Normal   PROCALCITONIN - Normal    Narrative:     As a Marker for Sepsis (Non-Neonates):    1. <0.5 ng/mL represents a low risk of severe sepsis and/or septic shock.  2. >2 ng/mL represents a high risk of severe sepsis and/or septic shock.    As a Marker for Lower Respiratory Tract Infections that require antibiotic therapy:    PCT on Admission    Antibiotic Therapy       6-12 Hrs later    >0.5                Strongly Recommended  >0.25 - <0.5        Recommended   0.1 - 0.25          Discouraged              Remeasure/reassess PCT  <0.1                Strongly Discouraged     Remeasure/reassess PCT    As 28 day mortality risk marker: \"Change in Procalcitonin Result\" (>80% or <=80%) if Day 0 (or Day 1) and Day 4 values are available. Refer to http://[email or web address]-pct-calculator.[email or web address]    Change in PCT <=80%  A decrease of PCT levels below or equal to 80% defines a positive change in PCT test result representing a higher risk for 28-day all-cause mortality of patients diagnosed with severe sepsis for septic shock.    Change in PCT >80%  A decrease of PCT levels of more than 80% defines a negative change in PCT " result representing a lower risk for 28-day all-cause mortality of patients diagnosed with severe sepsis or septic shock.      MAGNESIUM - Normal   BLOOD CULTURE   BLOOD CULTURE   STOOL CULTURE (REFERENCE LAB)   CLOSTRIDIOIDES DIFFICILE TOXIN    Narrative:     The following orders were created for panel order Clostridioides difficile Toxin - Stool, Per Rectum.  Procedure                               Abnormality         Status                     ---------                               -----------         ------                     Clostridioides difficile...[[id number]]                                                   Please view results for these tests on the individual orders.   CLOSTRIDIOIDES DIFFICILE TOXIN, PCR   CBC AND DIFFERENTIAL    Narrative:     The following orders were created for panel order CBC & Differential.  Procedure                               Abnormality         Status                     ---------                               -----------         ------                     CBC Auto Differential[[id number]]        Abnormal            Final result                 Please view results for these tests on the individual orders.       EKG:   No orders to display       Meds given in ED:   Medications   sodium chloride 0.9 % flush 10 mL (has no administration in time range)   sodium chloride 0.9 % flush 10 mL (has no administration in time range)   sodium chloride 0.9 % flush 10 mL (has no administration in time range)   sodium chloride 0.9 % infusion 40 mL (has no administration in time range)   sodium chloride 0.9 % infusion (100 mL/hr Intravenous New Bag 12/17/23 1047)   ondansetron (ZOFRAN) injection 4 mg (has no administration in time range)   sodium chloride 0.9 % bolus 500 mL (0 mL Intravenous Stopped 12/17/23 0747)   ondansetron (ZOFRAN) injection 4 mg (4 mg Intravenous Given 12/17/23 0430)   loperamide (IMODIUM) capsule 4 mg (4 mg Oral Given 12/17/23 0637)   potassium chloride 10 mEq in 100  mL IVPB (0 mEq Intravenous Stopped 23 0738)       Imaging results:  No radiology results for the last day    Ambulatory status:   - assist x1    Social issues:   Social History     Socioeconomic History    Marital status:    Tobacco Use    Smoking status: Former     Packs/day: 1.00     Years: 10.00     Additional pack years: 0.00     Total pack years: 10.00     Types: Cigarettes     Quit date: 1971     Years since quittin.0     Passive exposure: Past    Smokeless tobacco: Never   Vaping Use    Vaping Use: Never used   Substance and Sexual Activity    Alcohol use: No    Drug use: No    Sexual activity: Defer     Partners: Male       NIH Stroke Scale:       Estefani Page RN  23 12:12 EST

## 2023-12-17 NOTE — H&P
Patient Name:  Jewel Best  YOB: 1940  MRN:  [id number]  Admit Date:  12/17/2023  Patient Care Team:  Ernie Panda MD as PCP - General  Ernie Panda MD as PCP - Family Medicine  Renee Patel RN as Ambulatory  ([facility])      Subjective   History Present Illness     Chief Complaint   Patient presents with    Diarrhea         History of Present Illness  This is a 83-year-old male, with past medical history significant for anemia, anxiety, depression, hyperlipidemia, hypertension, seen in the hospital with persistent diarrhea.  Patient has had ongoing diarrhea for almost 1 week.  Patient will be admitted to hospitalist service for further workup of symptoms.  Gastroenterology will be consulted.      Review of Systems     Constitutional: Negative for activity change and appetite change.   HENT: Negative for congestion and dental problem.    Eyes: Negative for discharge and itching.   Respiratory: Negative for apnea and chest tightness.    Cardiovascular: Negative for chest pain and palpitations.   Gastrointestinal: Negative for abdominal distention and abdominal pain.   Endocrine: Negative for cold intolerance and heat intolerance.   Genitourinary: Negative for difficulty urinating and frequency.   Musculoskeletal: Negative for arthralgias and back pain.   Skin: Negative for color change and pallor.   Allergic/Immunologic: Negative for environmental allergies and food allergies.   Neurological: Negative for dizziness and facial asymmetry.   Hematological: Negative for adenopathy. Does not bruise/bleed easily.   Psychiatric/Behavioral: Negative for agitation and suicidal ideas.       Personal History     Past Medical History:   Diagnosis Date    Anemia     Anxiety     Bladder cancer     Depression     Eating disorder     Hyperlipidemia     Hypertension     Lymphoma      Past Surgical History:   Procedure Laterality Date    COLONOSCOPY      KNEE  SURGERY      bakers cyst removal     Family History   Family history unknown: Yes     Social History     Tobacco Use    Smoking status: Former     Packs/day: 1.00     Years: 10.00     Additional pack years: 0.00     Total pack years: 10.00     Types: Cigarettes     Quit date: 1971     Years since quittin.0     Passive exposure: Past    Smokeless tobacco: Never   Vaping Use    Vaping Use: Never used   Substance Use Topics    Alcohol use: No    Drug use: No     No current facility-administered medications on file prior to encounter.     Current Outpatient Medications on File Prior to Encounter   Medication Sig Dispense Refill    FLUoxetine (PROzac) 20 MG capsule Take 1 capsule by mouth Daily. (Patient taking differently: Take 1 capsule by mouth Daily As Needed (per pt).) 30 capsule 5    lisinopril (PRINIVIL,ZESTRIL) 10 MG tablet Take 1 tablet by mouth Daily. 30 tablet 5    cholestyramine (QUESTRAN) 4 g packet Take 1 packet by mouth 2 (Two) Times a Day As Needed (diarrhea). 60 each 99     No Known Allergies    Objective    Objective     Vital Signs  Temp:  [97.2 °F (36.2 °C)-99 °F (37.2 °C)] 97.2 °F (36.2 °C)  Heart Rate:  [86-96] 87  Resp:  [16-18] 18  BP: (100-140)/(53-87) 112/55  SpO2:  [94 %-100 %] 98 %  on   ;   Device (Oxygen Therapy): partial rebreather mask  Body mass index is 23.17 kg/m².    Physical Exam  General, awake and alert.  Head and ENT, normocephalic and atraumatic.  Lungs, symmetric expansion, equal air entry bilaterally.  Heart, regular rate and rhythm.  Abdomen, soft and nontender.  Extremities, no clubbing or cyanosis.  Neuro, no focal deficits.  Skin: Warm and no rash.  Psych, normal mood and affect.  Musculoskeletal, joint examination is grossly normal.      Results Review:  I reviewed the patient's new clinical results.  I reviewed the patient's new imaging results and agree with the interpretation.  I reviewed the patient's other test results and agree with the interpretation  I  personally viewed and interpreted the patient's EKG/Telemetry data  Discussed with ED provider.    Lab Results (last 24 hours)       Procedure Component Value Units Date/Time    CBC & Differential [[id number]]  (Abnormal) Collected: 12/17/23 0426    Specimen: Blood Updated: 12/17/23 0441    Narrative:      The following orders were created for panel order CBC & Differential.  Procedure                               Abnormality         Status                     ---------                               -----------         ------                     CBC Auto Differential[[id number]]        Abnormal            Final result                 Please view results for these tests on the individual orders.    CBC Auto Differential [[id number]]  (Abnormal) Collected: 12/17/23 0426    Specimen: Blood Updated: 12/17/23 0441     WBC 18.98 10*3/mm3      RBC 4.58 10*6/mm3      Hemoglobin 14.0 g/dL      Hematocrit 40.4 %      MCV 88.2 fL      MCH 30.6 pg      MCHC 34.7 g/dL      RDW 13.7 %      RDW-SD 44.3 fl      MPV 9.9 fL      Platelets 333 10*3/mm3      Neutrophil % 69.3 %      Lymphocyte % 16.4 %      Monocyte % 3.7 %      Eosinophil % 9.3 %      Basophil % 0.2 %      Immature Grans % 1.1 %      Neutrophils, Absolute 13.15 10*3/mm3      Lymphocytes, Absolute 3.11 10*3/mm3      Monocytes, Absolute 0.71 10*3/mm3      Eosinophils, Absolute 1.77 10*3/mm3      Basophils, Absolute 0.03 10*3/mm3      Immature Grans, Absolute 0.21 10*3/mm3      nRBC 0.0 /100 WBC     Blood Culture - Blood, Arm, Right [[id number]] Collected: 12/17/23 0457    Specimen: Blood from Arm, Right Updated: 12/17/23 0509    Blood Culture - Blood, Arm, Left [[id number]] Collected: 12/17/23 0511    Specimen: Blood from Arm, Left Updated: 12/17/23 0516    Comprehensive Metabolic Panel [[id number]]  (Abnormal) Collected: 12/17/23 0513    Specimen: Blood Updated: 12/17/23 0542     Glucose 99 mg/dL      BUN 15 mg/dL      Creatinine 1.09 mg/dL      Sodium 137 mmol/L       "Potassium 3.1 mmol/L      Chloride 105 mmol/L      CO2 18.0 mmol/L      Calcium 8.3 mg/dL      Total Protein 5.9 g/dL      Albumin 3.7 g/dL      ALT (SGPT) 11 U/L      AST (SGOT) 6 U/L      Alkaline Phosphatase 88 U/L      Total Bilirubin 0.2 mg/dL      Globulin 2.2 gm/dL      A/G Ratio 1.7 g/dL      BUN/Creatinine Ratio 13.8     Anion Gap 14.0 mmol/L      eGFR 66.5 mL/min/1.73     Narrative:      GFR Normal >60  Chronic Kidney Disease <60  Kidney Failure <15    The GFR formula is only valid for adults with stable renal function between ages 18 and 70.    Lactic Acid, Plasma [[id number]]  (Normal) Collected: 12/17/23 0513    Specimen: Blood Updated: 12/17/23 0542     Lactate 1.3 mmol/L     Procalcitonin [[id number]]  (Normal) Collected: 12/17/23 0513    Specimen: Blood Updated: 12/17/23 0549     Procalcitonin 0.13 ng/mL     Narrative:      As a Marker for Sepsis (Non-Neonates):    1. <0.5 ng/mL represents a low risk of severe sepsis and/or septic shock.  2. >2 ng/mL represents a high risk of severe sepsis and/or septic shock.    As a Marker for Lower Respiratory Tract Infections that require antibiotic therapy:    PCT on Admission    Antibiotic Therapy       6-12 Hrs later    >0.5                Strongly Recommended  >0.25 - <0.5        Recommended   0.1 - 0.25          Discouraged              Remeasure/reassess PCT  <0.1                Strongly Discouraged     Remeasure/reassess PCT    As 28 day mortality risk marker: \"Change in Procalcitonin Result\" (>80% or <=80%) if Day 0 (or Day 1) and Day 4 values are available. Refer to http://www.[facility]-pct-calculator.com    Change in PCT <=80%  A decrease of PCT levels below or equal to 80% defines a positive change in PCT test result representing a higher risk for 28-day all-cause mortality of patients diagnosed with severe sepsis for septic shock.    Change in PCT >80%  A decrease of PCT levels of more than 80% defines a negative change in PCT result representing a lower " risk for 28-day all-cause mortality of patients diagnosed with severe sepsis or septic shock.       Magnesium [[id number]]  (Normal) Collected: 12/17/23 0513    Specimen: Blood Updated: 12/17/23 0722     Magnesium 1.7 mg/dL     Urinalysis With Microscopic If Indicated (No Culture) - Urine, Clean Catch [[id number]]  (Abnormal) Collected: 12/17/23 1009    Specimen: Urine, Clean Catch Updated: 12/17/23 1035     Color, UA Yellow     Appearance, UA Clear     pH, UA 5.5     Specific Gravity, UA 1.017     Glucose, UA Negative     Ketones, UA 15 mg/dL (1+)     Bilirubin, UA Negative     Blood, UA Negative     Protein, UA Trace     Leuk Esterase, UA Negative     Nitrite, UA Negative     Urobilinogen, UA 0.2 E.U./dL    Narrative:      Urine microscopic not indicated.            Imaging Results (Last 24 Hours)       ** No results found for the last 24 hours. **                No orders to display        Assessment/Plan     Active Hospital Problems    Diagnosis  POA    **Diarrhea [R19.7]  Yes    Major depression, single episode [F32.9]  Yes    Anemia [D64.9]  Yes    Benign essential hypertension [I10]  Yes    Hyperlipidemia [E78.5]  Yes      Resolved Hospital Problems   No resolved problems to display.       Assessment and plan  1.  Persistent diarrhea, dehydration, initiate IV fluids, consult gastroenterology.    2.  Poor appetite with weight loss, encourage p.o. intake and nutrition consult.    3.  Leukocytosis, likely reactive, monitor labs.    4.  CODE STATUS is full code.  Further plans based on hospital course.       Brannon Holder MD  [facility]  12/17/23  15:20 EST

## 2023-12-17 NOTE — CONSULTS
[facility]  Initial Inpatient Consult Note    Referring Provider: Dr. Holder    Reason for Consultation: Persistent diarrhea    Subjective     History of present illness:    83 y.o. male, who we are asked to see for persistent diarrhea.  Patient was recently seen by her service during a recent hospitalization for the same.  At that time he complained of persistent diarrhea that it began 5 to 6 days prior to admission.  This has not relented.  He had negative stool studies and a CT scan at that time.  Last colonoscopy was over 10 years ago.  He was discharged on cholestyramine.    He reports that he did not take the cholestyramine.  The diarrhea continued when he was at home.  He reports he has not a good appetite for about 6 months.  He admits he was not eating or drinking much yesterday.  He reports that he drinks 6 tumblers of ice water yesterday but prior to that he was not doing very well.  He is not nauseated.  He has no abdominal pain.  He has not seen any blood in his stool.    He had negative C. difficile and GI PCR panel on 2023.  CT of the abdomen and pelvis from 2023, reviewed by me showed liquid stool in the rectum no bowel wall thickening.  This was a noncontrast study.    Past Medical History:  Past Medical History:   Diagnosis Date    Anemia     Anxiety     Bladder cancer     Depression     Eating disorder     Hyperlipidemia     Hypertension     Lymphoma      Past Surgical History:  Past Surgical History:   Procedure Laterality Date    COLONOSCOPY      KNEE SURGERY      bakers cyst removal      Social History:   Social History     Tobacco Use    Smoking status: Former     Packs/day: 1.00     Years: 10.00     Additional pack years: 0.00     Total pack years: 10.00     Types: Cigarettes     Quit date: 1971     Years since quittin.0     Passive exposure: Past    Smokeless tobacco: Never   Substance Use Topics    Alcohol use: No      Family History:  Family  History   Family history unknown: Yes       Home Meds:  (Not in a hospital admission)    Current Meds:   cyanocobalamin, 1,000 mcg, Intramuscular, Q30 Days  sodium chloride, 10 mL, Intravenous, Q12H      Allergies:  No Known Allergies  Review of Systems  Pertinent items are noted in HPI     Objective     Vital Signs  Temp:  [99 °F (37.2 °C)] 99 °F (37.2 °C)  Heart Rate:  [86-96] 86  Resp:  [16-18] 18  BP: (100-140)/(53-70) 105/57  Physical Exam:  General Appearance:    Alert, cooperative, in no acute distress   Head:    Normocephalic, without obvious abnormality, atraumatic   Eyes:          conjunctivae and sclerae normal, no   icterus   Throat:   no thrush, oral mucosa moist   Neck:   Supple, no adenopathy   Lungs:     Clear to auscultation bilaterally    Heart:    Regular rhythm and normal rate    Chest Wall:    No abnormalities observed   Abdomen:     Soft, nondistended, nontender; normal bowel sounds   Extremities:   no edema, no redness   Skin:   No bruising or rash   Psychiatric:  normal mood and insight     Results Review:   I reviewed the patient's new clinical results.    Results from last 7 days   Lab Units 12/17/23  0426 12/14/23  0411 12/13/23  1008   WBC 10*3/mm3 18.98* 10.25 17.05*   HEMOGLOBIN g/dL 14.0 10.8* 13.1   HEMATOCRIT % 40.4 33.3* 40.0   PLATELETS 10*3/mm3 333 296 255     Results from last 7 days   Lab Units 12/17/23  0513 12/14/23  0411 12/13/23  2113 12/13/23  1008   SODIUM mmol/L 137 137 139 136   POTASSIUM mmol/L 3.1* 3.6 3.9 3.9   CHLORIDE mmol/L 105 110* 108* 107   CO2 mmol/L 18.0* 16.5* 18.0* 14.0*   BUN mg/dL 15 15 18 22   CREATININE mg/dL 1.09 1.05 1.27 1.35*   CALCIUM mg/dL 8.3* 8.0* 8.5* 9.0   BILIRUBIN mg/dL 0.2  --   --  0.3   ALK PHOS U/L 88  --   --  91   ALT (SGPT) U/L 11  --   --  13   AST (SGOT) U/L 6  --   --  13   GLUCOSE mg/dL 99 90 138* 109*         Lab Results   Lab Value Date/Time    LIPASE 16 12/13/2023 1008    LIPASE 11 12/11/2023 1316       Radiology:  No orders to  display       Assessment & Plan   Active Hospital Problems    Diagnosis     **Diarrhea        Assessment:  Diarrhea  Dehydration  Poor appetite with weight loss  Leukocytosis    Plan:  Follow stool output-he had previous admission for diarrhea which then resolved during the hospitalization.  Could resume cholestyramine if diarrhea persist.  He was not using this at home  Continue IV fluid rehydration  Nutrition consult  Dairy free diet-he reports he was drinking many glasses of milk a day and having regular ice cream at home.  This is likely not the cause of his diarrhea but may be contributing  Check celiac panel, TSH      I discussed the patients findings and my recommendations with patient.    Ruby Warner MD

## 2023-12-17 NOTE — PLAN OF CARE
Goal Outcome Evaluation:  Plan of Care Reviewed With: grandchild(tip)        Progress: no change   New admit to floor from ER with complaints of diarrhea. Informed patient of need for stool sample. IV fluids infusing.

## 2023-12-17 NOTE — ED PROVIDER NOTES
EMERGENCY DEPARTMENT ENCOUNTER    Room Number:  13/13  Date of encounter:  12/17/2023  PCP: Ernie Panda MD  Historian: Patient    Patient was placed in face mask during triage process. Patient was wearing facemask when I entered the room and throughout our encounter. I wore full protective equipment throughout this patient encounter including a face mask, eye protection, and gloves. Hand hygiene was performed before donning protective equipment and again following doffing of PPE after leaving the room.    HPI:  Chief Complaint: Nausea vomiting diarrhea  A complete HPI/ROS/PMH/PSH/SH/FH are unobtainable due to: N/A   Context: Jewel Best is a 83 y.o. male who presents to the ED c/o ongoing nausea vomiting diarrhea now for a week.  Patient has not yet filled his medication prescriptions from his recent hospitalization.  No  Focal pain complaint at this time.  Patient reports feeling tired.  No dysuria hematuria nor fevers reported.      MEDICAL HISTORY REVIEW  Additional sources:  - Discussed/ obtained information from independent historians:      - External (non-ED) record review: Hospital H&P 12/13/2023 reviewed: Patient admitted for gastroenteritis and dehydration.  Patient was prescribed cholestyramine.    - Chronic or social conditions impacting care: Anxiety depression; advanced age      PAST MEDICAL HISTORY  Active Ambulatory Problems     Diagnosis Date Noted    Anemia 04/25/2016    Anxiety disorder 04/25/2016    Benign essential hypertension 04/25/2016    Eczema 04/25/2016    Hyperlipidemia 04/25/2016    Fistula of skin 06/29/2016    Lymphoma in remission 09/01/2016    Body mass index (BMI) 30.0-30.9, adult  11/15/2021    Major depression, single episode 07/24/2023    COVID-19 08/25/2023    VETO (acute kidney injury) 08/26/2023    Mild cognitive impairment 08/31/2023    Diarrhea 12/13/2023     Resolved Ambulatory Problems     Diagnosis Date Noted    No Resolved Ambulatory Problems     Past  Medical History:   Diagnosis Date    Anxiety     Bladder cancer     Depression     Eating disorder     Hypertension     Lymphoma          PAST SURGICAL HISTORY  Past Surgical History:   Procedure Laterality Date    COLONOSCOPY      KNEE SURGERY      bakers cyst removal         FAMILY HISTORY  Family History   Family history unknown: Yes         SOCIAL HISTORY  Social History     Socioeconomic History    Marital status:    Tobacco Use    Smoking status: Former     Packs/day: 1.00     Years: 10.00     Additional pack years: 0.00     Total pack years: 10.00     Types: Cigarettes     Quit date: 1971     Years since quittin.0     Passive exposure: Past    Smokeless tobacco: Never   Vaping Use    Vaping Use: Never used   Substance and Sexual Activity    Alcohol use: No    Drug use: No    Sexual activity: Defer     Partners: Male         ALLERGIES  Patient has no known allergies.        REVIEW OF SYSTEMS  Review of Systems     All systems reviewed and negative except for those discussed in HPI.       PHYSICAL EXAM    I have reviewed the triage vital signs and nursing notes.    ED Triage Vitals [23 0404]   Temp Heart Rate Resp BP SpO2   99 °F (37.2 °C) 96 16 140/70 100 %      Temp src Heart Rate Source Patient Position BP Location FiO2 (%)   Oral Monitor Lying Right arm --       Physical Exam    Physical Exam   Constitutional: No distress.  Nontoxic  HENT:  Head: Normocephalic and atraumatic.   Oropharynx: Mucous membranes are moist.   Eyes: No scleral icterus.   Neck: Neck supple.   Cardiovascular: Pink, warm and well-perfused throughout  Pulmonary/Chest: No respiratory distress.  No tachypnea or increased work of breathing  Abdominal: Soft.  No rebound or rigidity.  Benign exam  Musculoskeletal: Moves all extremities equally.   Neurological: Alert.  Speech fluent and easily intelligible  Skin: Skin is pink, warm, and dry. No pallor.   Psychiatric: Mood and affect normal.   Nursing note and vitals  reviewed.    LAB RESULTS  Recent Results (from the past 24 hour(s))   CBC Auto Differential    Collection Time: 12/17/23  4:26 AM    Specimen: Blood   Result Value Ref Range    WBC 18.98 (H) 3.40 - 10.80 10*3/mm3    RBC 4.58 4.14 - 5.80 10*6/mm3    Hemoglobin 14.0 13.0 - 17.7 g/dL    Hematocrit 40.4 37.5 - 51.0 %    MCV 88.2 79.0 - 97.0 fL    MCH 30.6 26.6 - 33.0 pg    MCHC 34.7 31.5 - 35.7 g/dL    RDW 13.7 12.3 - 15.4 %    RDW-SD 44.3 37.0 - 54.0 fl    MPV 9.9 6.0 - 12.0 fL    Platelets 333 140 - 450 10*3/mm3    Neutrophil % 69.3 42.7 - 76.0 %    Lymphocyte % 16.4 (L) 19.6 - 45.3 %    Monocyte % 3.7 (L) 5.0 - 12.0 %    Eosinophil % 9.3 (H) 0.3 - 6.2 %    Basophil % 0.2 0.0 - 1.5 %    Immature Grans % 1.1 (H) 0.0 - 0.5 %    Neutrophils, Absolute 13.15 (H) 1.70 - 7.00 10*3/mm3    Lymphocytes, Absolute 3.11 (H) 0.70 - 3.10 10*3/mm3    Monocytes, Absolute 0.71 0.10 - 0.90 10*3/mm3    Eosinophils, Absolute 1.77 (H) 0.00 - 0.40 10*3/mm3    Basophils, Absolute 0.03 0.00 - 0.20 10*3/mm3    Immature Grans, Absolute 0.21 (H) 0.00 - 0.05 10*3/mm3    nRBC 0.0 0.0 - 0.2 /100 WBC   Comprehensive Metabolic Panel    Collection Time: 12/17/23  5:13 AM    Specimen: Blood   Result Value Ref Range    Glucose 99 65 - 99 mg/dL    BUN 15 8 - 23 mg/dL    Creatinine 1.09 0.76 - 1.27 mg/dL    Sodium 137 136 - 145 mmol/L    Potassium 3.1 (L) 3.5 - 5.2 mmol/L    Chloride 105 98 - 107 mmol/L    CO2 18.0 (L) 22.0 - 29.0 mmol/L    Calcium 8.3 (L) 8.6 - 10.5 mg/dL    Total Protein 5.9 (L) 6.0 - 8.5 g/dL    Albumin 3.7 3.5 - 5.2 g/dL    ALT (SGPT) 11 1 - 41 U/L    AST (SGOT) 6 1 - 40 U/L    Alkaline Phosphatase 88 39 - 117 U/L    Total Bilirubin 0.2 0.0 - 1.2 mg/dL    Globulin 2.2 gm/dL    A/G Ratio 1.7 g/dL    BUN/Creatinine Ratio 13.8 7.0 - 25.0    Anion Gap 14.0 5.0 - 15.0 mmol/L    eGFR 66.5 >60.0 mL/min/1.73   Lactic Acid, Plasma    Collection Time: 12/17/23  5:13 AM    Specimen: Blood   Result Value Ref Range    Lactate 1.3 0.5 - 2.0 mmol/L    Procalcitonin    Collection Time: 12/17/23  5:13 AM    Specimen: Blood   Result Value Ref Range    Procalcitonin 0.13 0.00 - 0.25 ng/mL       Ordered the above labs and independently reviewed the results.        RADIOLOGY  No Radiology Exams Resulted Within Past 24 Hours    I ordered the above noted radiological studies. Reviewed by me and discussed with radiologist.  See dictation for official radiology interpretation.      PROCEDURES    Procedures        MEDICATIONS GIVEN IN ER    Medications   sodium chloride 0.9 % flush 10 mL (has no administration in time range)   loperamide (IMODIUM) capsule 4 mg (has no administration in time range)   potassium chloride 10 mEq in 100 mL IVPB (has no administration in time range)   sodium chloride 0.9 % bolus 500 mL (500 mL Intravenous New Bag 12/17/23 8305)   ondansetron (ZOFRAN) injection 4 mg (4 mg Intravenous Given 12/17/23 4310)         PROGRESS, DATA ANALYSIS, CONSULTS, AND MEDICAL DECISION MAKING    My differential diagnosis for abdominal pain includes but is not limited to:  Gastritis, gastroenteritis, peptic ulcer disease, GERD, esophageal perforation, acute appendicitis, mesenteric adenitis, Meckel’s diverticulum, epiploic appendagitis, diverticulitis, colon cancer, ulcerative colitis, Crohn’s disease, intussusception, small bowel obstruction, adhesions, ischemic bowel, perforated viscus, ileus, obstipation, biliary colic, cholecystitis, cholelithiasis, Vic-Demarco Shailesh, hepatitis, pancreatitis, common bile duct obstruction, cholangitis, bile leak, splenic trauma, splenic rupture, splenic infarction, splenic abscess, abdominal abscess, ascites, spontaneous bacterial peritonitis, hernia, UTI, cystitis, prostatitis, ureterolithiasis, urinary obstruction, AAA, myocardial infarction, pneumonia, cancer, porphyria, DKA, medications, sickle cell, viral syndrome, zoster      All labs have been independently reviewed by me.  All radiology studies have been reviewed by me  and discussed with radiologist dictating the report.   EKG's independently viewed and interpreted by me.  Discussion below represents my analysis of pertinent findings related to patient's condition, differential diagnosis, treatment plan and final disposition.      ED Course as of 12/17/23 0634   Sun Dec 17, 2023   0444 WBC(!): 18.98 [RS]   0444 Hemoglobin: 14.0 [RS]   0445 Immature Grans, Absolute(!): 0.21 [RS]   0609 Procalcitonin: 0.13 [RS]   0609 Lactate: 1.3 [RS]   0609 Glucose: 99 [RS]   0609 BUN: 15 [RS]   0609 Creatinine: 1.09 [RS]   0609 Sodium: 137 [RS]   0609 Potassium(!): 3.1 [RS]   0609 CO2(!): 18.0 [RS]   0609 Anion Gap: 14.0 [RS]   0609 ALT (SGPT): 11 [RS]   0609 AST (SGOT): 6 [RS]   0609 Total Bilirubin: 0.2 [RS]   0617 Reviewed all findings with patient.  He is very nervous about going home to live by himself until we have sorted through this more.  Given that he has hypokalemia and leukocytosis today, I think is reasonable to asked the medicine service to admit the patient for further evaluation and treatment. [RS]   0633 CONSULT        Provider: TIAN ARREGUIN    Discussion: Reviewed patient history, ED presentation and evaluation as well as abnormal findings and pending urinalysis.  She is agreeable to accept the patient for observation admission on behalf of Dr. Hany Bullock c treatment and planned disposition.         [RS]      ED Course User Index  [RS] Siva Flynn MD       AS OF 06:34 EST VITALS:    BP - 140/70  HR - 96  TEMP - 99 °F (37.2 °C) (Oral)  O2 SATS - 100%        DIAGNOSIS  Final diagnoses:   Nausea vomiting and diarrhea   Leukocytosis, unspecified type   Poor appetite   Failure to thrive in adult   Hypokalemia         DISPOSITION  ADMISSION    Discussed treatment plan and reason for admission with pt/family and admitting physician.  Pt/family voiced understanding of the plan for admission for further testing/treatment as needed.       Please note that portions of this  were completed with a voice recognition program.       Note Disclaimer: At [facility], we believe that sharing information builds trust and better relationships. You are receiving this note because you are receiving care at [facility] or recently visited. It is possible you will see health information before a provider has talked with you about it. This kind of information can be easy to misunderstand. To help you fully understand what it means for your health, we urge you to discuss this note with your provider.     Siva Flynn MD  12/17/23 0664

## 2023-12-18 PROBLEM — E43 SEVERE MALNUTRITION: Status: ACTIVE | Noted: 2023-12-18

## 2023-12-18 LAB
ADV 40+41 DNA STL QL NAA+NON-PROBE: NOT DETECTED
ALBUMIN SERPL-MCNC: 2.8 G/DL (ref 3.5–5.2)
ALBUMIN/GLOB SERPL: 1.3 G/DL
ALP SERPL-CCNC: 76 U/L (ref 39–117)
ALT SERPL W P-5'-P-CCNC: 9 U/L (ref 1–41)
ANION GAP SERPL CALCULATED.3IONS-SCNC: 12.1 MMOL/L (ref 5–15)
AST SERPL-CCNC: 11 U/L (ref 1–40)
ASTRO TYP 1-8 RNA STL QL NAA+NON-PROBE: NOT DETECTED
BASOPHILS # BLD AUTO: 0.03 10*3/MM3 (ref 0–0.2)
BASOPHILS NFR BLD AUTO: 0.2 % (ref 0–1.5)
BILIRUB SERPL-MCNC: 0.2 MG/DL (ref 0–1.2)
BUN SERPL-MCNC: 16 MG/DL (ref 8–23)
BUN/CREAT SERPL: 15.4 (ref 7–25)
C CAYETANENSIS DNA STL QL NAA+NON-PROBE: NOT DETECTED
C COLI+JEJ+UPSA DNA STL QL NAA+NON-PROBE: NOT DETECTED
C DIFF TOX GENS STL QL NAA+PROBE: NEGATIVE
CALCIUM SPEC-SCNC: 7.5 MG/DL (ref 8.6–10.5)
CHLORIDE SERPL-SCNC: 109 MMOL/L (ref 98–107)
CO2 SERPL-SCNC: 17.9 MMOL/L (ref 22–29)
CREAT SERPL-MCNC: 1.04 MG/DL (ref 0.76–1.27)
CRYPTOSP DNA STL QL NAA+NON-PROBE: NOT DETECTED
DEPRECATED RDW RBC AUTO: 42.9 FL (ref 37–54)
E HISTOLYT DNA STL QL NAA+NON-PROBE: NOT DETECTED
EGFRCR SERPLBLD CKD-EPI 2021: 70.4 ML/MIN/1.73
EOSINOPHIL # BLD AUTO: 3.21 10*3/MM3 (ref 0–0.4)
EOSINOPHIL NFR BLD AUTO: 19.5 % (ref 0.3–6.2)
ERYTHROCYTE [DISTWIDTH] IN BLOOD BY AUTOMATED COUNT: 13.6 % (ref 12.3–15.4)
G LAMBLIA DNA STL QL NAA+NON-PROBE: NOT DETECTED
GLOBULIN UR ELPH-MCNC: 2.2 GM/DL
GLUCOSE SERPL-MCNC: 79 MG/DL (ref 65–99)
HCT VFR BLD AUTO: 31.4 % (ref 37.5–51)
HGB BLD-MCNC: 10.7 G/DL (ref 13–17.7)
IMM GRANULOCYTES # BLD AUTO: 0.14 10*3/MM3 (ref 0–0.05)
IMM GRANULOCYTES NFR BLD AUTO: 0.9 % (ref 0–0.5)
LYMPHOCYTES # BLD AUTO: 3.33 10*3/MM3 (ref 0.7–3.1)
LYMPHOCYTES NFR BLD AUTO: 20.2 % (ref 19.6–45.3)
MAGNESIUM SERPL-MCNC: 1.7 MG/DL (ref 1.6–2.4)
MCH RBC QN AUTO: 29.8 PG (ref 26.6–33)
MCHC RBC AUTO-ENTMCNC: 34.1 G/DL (ref 31.5–35.7)
MCV RBC AUTO: 87.5 FL (ref 79–97)
MONOCYTES # BLD AUTO: 0.88 10*3/MM3 (ref 0.1–0.9)
MONOCYTES NFR BLD AUTO: 5.3 % (ref 5–12)
NEUTROPHILS NFR BLD AUTO: 53.9 % (ref 42.7–76)
NEUTROPHILS NFR BLD AUTO: 8.87 10*3/MM3 (ref 1.7–7)
NOROVIRUS GI+II RNA STL QL NAA+NON-PROBE: NOT DETECTED
NRBC BLD AUTO-RTO: 0 /100 WBC (ref 0–0.2)
P SHIGELLOIDES DNA STL QL NAA+NON-PROBE: NOT DETECTED
PHOSPHATE SERPL-MCNC: 1.9 MG/DL (ref 2.5–4.5)
PHOSPHATE SERPL-MCNC: 2.2 MG/DL (ref 2.5–4.5)
PLATELET # BLD AUTO: 302 10*3/MM3 (ref 140–450)
PMV BLD AUTO: 10 FL (ref 6–12)
POTASSIUM SERPL-SCNC: 3.2 MMOL/L (ref 3.5–5.2)
POTASSIUM SERPL-SCNC: 3.4 MMOL/L (ref 3.5–5.2)
PROT SERPL-MCNC: 5 G/DL (ref 6–8.5)
RBC # BLD AUTO: 3.59 10*6/MM3 (ref 4.14–5.8)
RVA RNA STL QL NAA+NON-PROBE: NOT DETECTED
S ENT+BONG DNA STL QL NAA+NON-PROBE: NOT DETECTED
SAPO I+II+IV+V RNA STL QL NAA+NON-PROBE: DETECTED
SODIUM SERPL-SCNC: 139 MMOL/L (ref 136–145)
TSH SERPL DL<=0.05 MIU/L-ACNC: 3.96 UIU/ML (ref 0.27–4.2)
V CHOL+PARA+VUL DNA STL QL NAA+NON-PROBE: NOT DETECTED
V CHOLERAE DNA STL QL NAA+NON-PROBE: NOT DETECTED
WBC NRBC COR # BLD AUTO: 16.46 10*3/MM3 (ref 3.4–10.8)
Y ENTEROCOL DNA STL QL NAA+NON-PROBE: NOT DETECTED

## 2023-12-18 PROCEDURE — 25010000002 ONDANSETRON PER 1 MG: Performed by: INTERNAL MEDICINE

## 2023-12-18 PROCEDURE — 97530 THERAPEUTIC ACTIVITIES: CPT

## 2023-12-18 PROCEDURE — 25810000003 SODIUM CHLORIDE 0.9 % SOLUTION: Performed by: INTERNAL MEDICINE

## 2023-12-18 PROCEDURE — 97161 PT EVAL LOW COMPLEX 20 MIN: CPT

## 2023-12-18 PROCEDURE — 82784 ASSAY IGA/IGD/IGG/IGM EACH: CPT | Performed by: INTERNAL MEDICINE

## 2023-12-18 PROCEDURE — 84443 ASSAY THYROID STIM HORMONE: CPT | Performed by: INTERNAL MEDICINE

## 2023-12-18 PROCEDURE — 86364 TISS TRNSGLTMNASE EA IG CLAS: CPT | Performed by: INTERNAL MEDICINE

## 2023-12-18 PROCEDURE — 86258 DGP ANTIBODY EACH IG CLASS: CPT | Performed by: INTERNAL MEDICINE

## 2023-12-18 PROCEDURE — 99232 SBSQ HOSP IP/OBS MODERATE 35: CPT

## 2023-12-18 PROCEDURE — 80053 COMPREHEN METABOLIC PANEL: CPT | Performed by: INTERNAL MEDICINE

## 2023-12-18 PROCEDURE — 25810000003 LACTATED RINGERS PER 1000 ML: Performed by: INTERNAL MEDICINE

## 2023-12-18 PROCEDURE — 83735 ASSAY OF MAGNESIUM: CPT | Performed by: INTERNAL MEDICINE

## 2023-12-18 PROCEDURE — 36415 COLL VENOUS BLD VENIPUNCTURE: CPT | Performed by: INTERNAL MEDICINE

## 2023-12-18 PROCEDURE — 84132 ASSAY OF SERUM POTASSIUM: CPT | Performed by: INTERNAL MEDICINE

## 2023-12-18 PROCEDURE — 84100 ASSAY OF PHOSPHORUS: CPT | Performed by: INTERNAL MEDICINE

## 2023-12-18 PROCEDURE — 85025 COMPLETE CBC W/AUTO DIFF WBC: CPT | Performed by: INTERNAL MEDICINE

## 2023-12-18 RX ORDER — POTASSIUM CHLORIDE 750 MG/1
40 TABLET, FILM COATED, EXTENDED RELEASE ORAL EVERY 4 HOURS
Status: COMPLETED | OUTPATIENT
Start: 2023-12-18 | End: 2023-12-18

## 2023-12-18 RX ORDER — POTASSIUM CHLORIDE 750 MG/1
40 TABLET, FILM COATED, EXTENDED RELEASE ORAL EVERY 4 HOURS
Status: DISPENSED | OUTPATIENT
Start: 2023-12-18 | End: 2023-12-19

## 2023-12-18 RX ORDER — UREA 10 %
3 LOTION (ML) TOPICAL NIGHTLY PRN
Status: DISCONTINUED | OUTPATIENT
Start: 2023-12-18 | End: 2023-12-25 | Stop reason: HOSPADM

## 2023-12-18 RX ORDER — SODIUM CHLORIDE, SODIUM LACTATE, POTASSIUM CHLORIDE, CALCIUM CHLORIDE 600; 310; 30; 20 MG/100ML; MG/100ML; MG/100ML; MG/100ML
75 INJECTION, SOLUTION INTRAVENOUS CONTINUOUS
Status: ACTIVE | OUTPATIENT
Start: 2023-12-18 | End: 2023-12-22

## 2023-12-18 RX ADMIN — Medication 2 PACKET: at 22:02

## 2023-12-18 RX ADMIN — Medication 3 MG: at 23:00

## 2023-12-18 RX ADMIN — POTASSIUM CHLORIDE 40 MEQ: 750 TABLET, EXTENDED RELEASE ORAL at 13:17

## 2023-12-18 RX ADMIN — POTASSIUM CHLORIDE 40 MEQ: 750 TABLET, EXTENDED RELEASE ORAL at 22:02

## 2023-12-18 RX ADMIN — POTASSIUM CHLORIDE 40 MEQ: 750 TABLET, EXTENDED RELEASE ORAL at 09:31

## 2023-12-18 RX ADMIN — Medication 10 ML: at 22:03

## 2023-12-18 RX ADMIN — SODIUM CHLORIDE, POTASSIUM CHLORIDE, SODIUM LACTATE AND CALCIUM CHLORIDE 100 ML/HR: 600; 310; 30; 20 INJECTION, SOLUTION INTRAVENOUS at 09:31

## 2023-12-18 RX ADMIN — Medication 2 PACKET: at 12:52

## 2023-12-18 RX ADMIN — SODIUM CHLORIDE 100 ML/HR: 9 INJECTION, SOLUTION INTRAVENOUS at 04:42

## 2023-12-18 RX ADMIN — ONDANSETRON 4 MG: 2 INJECTION INTRAMUSCULAR; INTRAVENOUS at 18:17

## 2023-12-18 NOTE — CASE MANAGEMENT/SOCIAL WORK
Discharge Planning Assessment  [facility]     Patient Name: Jewel Best  MRN: [id number]  Today's Date: 12/18/2023    Admit Date: 12/17/2023    Plan: Home   Discharge Needs Assessment       Row Name 12/18/23 1035       Living Environment    People in Home alone    Current Living Arrangements apartment    Potentially Unsafe Housing Conditions none    Primary Care Provided by self    Provides Primary Care For no one    Family Caregiver if Needed none    Quality of Family Relationships unable to assess    Able to Return to Prior Arrangements yes       Resource/Environmental Concerns    Resource/Environmental Concerns none    Transportation Concerns none       Transition Planning    Patient/Family Anticipates Transition to home    Patient/Family Anticipated Services at Transition none    Transportation Anticipated public transportation       Discharge Needs Assessment    Readmission Within the Last 30 Days no previous admission in last 30 days    Equipment Currently Used at Home none    Concerns to be Addressed denies needs/concerns at this time    Anticipated Changes Related to Illness none    Equipment Needed After Discharge none    Provided Post Acute Provider List? N/A    Provided Post Acute Provider Quality & Resource List? N/A                   Discharge Plan       Row Name 12/18/23 1036       Plan    Plan Home    Plan Comments CCP met with patient at bedside. Introduced self and explained role of CCP. Patient confirmed the information on his face sheet is accurate. Patient confirmed the information on his face sheet is accurate. Patients PCP is Ernie Panda. Patients is enrolled into [facility]. Patient lives at home alone and independent in his ADLs. Patient has not history of HH or SNF. Patient does not use any DME. PT signed off. Patient plans home at discharge. States he will need and uber or lyft to return home. CCP to follow.                  Continued Care and Services - Admitted Since 12/17/2023     Coordination has not been started for this encounter.       Selected Continued Care - Episodes Includes continued care and service providers with selected services from the active episodes listed below      High Risk Care Management Episode start date: 9/28/2023   There are no active outsourced providers for this episode.                    Demographic Summary       Row Name 12/18/23 1035       General Information    Admission Type observation    Arrived From emergency department    Referral Source admission list    Reason for Consult discharge planning    Preferred Language English                   Functional Status       Row Name 12/18/23 1035       Functional Status    Usual Activity Tolerance good    Current Activity Tolerance good       Functional Status, IADL    Medications independent    Meal Preparation independent    Housekeeping independent    Laundry independent    Shopping independent       Mental Status    General Appearance WDL WDL       Mental Status Summary    Recent Changes in Mental Status/Cognitive Functioning no changes       Employment/    Employment Status retired                   Psychosocial    No documentation.                  Abuse/Neglect    No documentation.                  Legal    No documentation.                  Substance Abuse    No documentation.                  Patient Forms    No documentation.

## 2023-12-18 NOTE — PROGRESS NOTES
Name: Jewel Best ADMIT: 2023   : 1940  PCP: Ernie Panda MD    MRN: [id number] LOS: 0 days   AGE/SEX: 83 y.o. male  ROOM: [facility]     Subjective   Subjective   Chief Complaint   Patient presents with    Diarrhea     He denied diarrhea to me.  Might be having some limited history since he had 5 stools recorded overnight.  No chest pain or abdominal pain reported.  No shortness of breath or nausea or vomiting reported.     Objective   Objective   Vital Signs  Temp:  [97.6 °F (36.4 °C)-98.1 °F (36.7 °C)] 97.7 °F (36.5 °C)  Heart Rate:  [78-94] 86  Resp:  [18] 18  BP: (103-129)/(48-69) 110/64  SpO2:  [94 %-98 %] 98 %  on   ;   Device (Oxygen Therapy): room air  Body mass index is 23.17 kg/m².    Physical Exam  Vitals and nursing note reviewed.   Constitutional:       General: He is not in acute distress.     Appearance: He is ill-appearing (chronically). He is not diaphoretic.   Eyes:      General: No scleral icterus.  Cardiovascular:      Rate and Rhythm: Normal rate and regular rhythm.      Pulses: Normal pulses.   Pulmonary:      Effort: Pulmonary effort is normal.      Breath sounds: No wheezing.   Abdominal:      General: There is no distension.      Palpations: Abdomen is soft.      Tenderness: There is no abdominal tenderness. There is no guarding or rebound.   Musculoskeletal:         General: No swelling or tenderness.   Skin:     General: Skin is warm and dry.   Neurological:      Mental Status: He is alert.      Cranial Nerves: No cranial nerve deficit.   Psychiatric:         Mood and Affect: Mood normal.         Behavior: Behavior normal.       Results Review  I reviewed the patient's new clinical results.    Results from last 7 days   Lab Units 23  0512 23  0426 23  0411 23  1008   WBC 10*3/mm3 16.46* 18.98* 10.25 17.05*   HEMOGLOBIN g/dL 10.7* 14.0 10.8* 13.1   PLATELETS 10*3/mm3 302 333 296 255     Results from last 7 days   Lab Units  "12/18/23 0512 12/17/23 0513 12/14/23 0411 12/13/23 2113   SODIUM mmol/L 139 137 137 139   POTASSIUM mmol/L 3.2* 3.1* 3.6 3.9   CHLORIDE mmol/L 109* 105 110* 108*   CO2 mmol/L 17.9* 18.0* 16.5* 18.0*   BUN mg/dL 16 15 15 18   CREATININE mg/dL 1.04 1.09 1.05 1.27   GLUCOSE mg/dL 79 99 90 138*   EGFR mL/min/1.73 70.4 66.5 69.6 55.4*     Results from last 7 days   Lab Units 12/18/23 0512 12/17/23 0513 12/13/23  1008   ALBUMIN g/dL 2.8* 3.7 3.8   BILIRUBIN mg/dL 0.2 0.2 0.3   ALK PHOS U/L 76 88 91   AST (SGOT) U/L 11 6 13   ALT (SGPT) U/L 9 11 13     Results from last 7 days   Lab Units 12/18/23 0512 12/17/23 0513 12/14/23 0411 12/13/23 2113 12/13/23  1008   CALCIUM mg/dL 7.5* 8.3* 8.0* 8.5* 9.0   ALBUMIN g/dL 2.8* 3.7  --   --  3.8   MAGNESIUM mg/dL 1.7 1.7  --   --  1.8   PHOSPHORUS mg/dL 2.2*  --   --   --   --      Results from last 7 days   Lab Units 12/17/23 0513   PROCALCITONIN ng/mL 0.13   LACTATE mmol/L 1.3     No results found for: \"HGBA1C\", \"POCGLU\"    No radiology results for the last day    I have personally reviewed all medications:  Scheduled Medications  sodium chloride, 10 mL, Intravenous, Q12H      Infusions  lactated ringers, 100 mL/hr, Last Rate: 100 mL/hr (12/18/23 0931)      Diet  Diet: Gastrointestinal Diets, Cardiac Diets; Healthy Heart (2-3 Na+); Lactose-Controlled; Texture: Regular Texture (IDDSI 7); Fluid Consistency: Thin (IDDSI 0)    I have personally reviewed:  [x]  Laboratory   [x]  Microbiology   []  Radiology   [x]  EKG/Telemetry  []  Cardiology/Vascular   []  Pathology    [x]  Records       Assessment/Plan     Active Hospital Problems    Diagnosis  POA    **Diarrhea [R19.7]  Yes    Severe malnutrition [E43]  Yes    Major depression, single episode [F32.9]  Yes    Anemia [D64.9]  Yes    Benign essential hypertension [I10]  Yes    Hyperlipidemia [E78.5]  Yes      Resolved Hospital Problems   No resolved problems to display.       83 y.o. male admitted with Diarrhea.    Diarrhea: " C. difficile was negative.  He is not having any abdominal pain.  Lactic acid level okay.  Sending for GI PCR and he has stool culture pending as well.  Symptoms subjectively improving.  Continue supportive care.  GI following.  Malnutrition: Replace potassium phosphorus magnesium.  Leukocytosis: Improved some.  Will continue to monitor.  Afebrile.  Hypertension: Blood pressure not currently elevated.  Lisinopril held.  Resume if needed.  Patient reported he takes Prozac as needed at home.  Mood was okay on exam.  Monitor.  PPX: SCD  Disposition: TBD    Expected Discharge Date: 12/20/2023; Expected Discharge Time:      Osvaldo Benton MD  [facility]  12/18/23  14:27 EST    Dictated portions of note using Dragon dictation software.  Copied text in this note has been reviewed by me and remains accurate as of 12/18/23

## 2023-12-18 NOTE — PROGRESS NOTES
[facility]  Inpatient Progress Note    Reason for Follow Up: Persistent diarrhea    Subjective     Interval History:   Patient states he is feeling well this morning.  He denies any bowel movements so far today.  Says he is tolerating diet well.  Hemoglobin was noted to be down to 10.7 today from 10.0 yesterday.  Discussed with nursing who states that there have been no reports of black or bloody stool, hematemesis or coffee-ground emesis.  She states that he had 3 episodes of diarrhea last night and so far today has not had any episodes since 430 this morning.    Current Facility-Administered Medications:     Influenza Vac High-Dose Quad (FLUZONE HIGH DOSE) injection 0.7 mL, 0.7 mL, Intramuscular, During Hospitalization, Brannon Holder MD    ondansetron (ZOFRAN) injection 4 mg, 4 mg, Intravenous, Q6H PRN, Brannon Holder MD, 4 mg at 12/17/23 1814    [COMPLETED] Insert Peripheral IV, , , Once **AND** sodium chloride 0.9 % flush 10 mL, 10 mL, Intravenous, PRN, Siva Flynn MD    sodium chloride 0.9 % flush 10 mL, 10 mL, Intravenous, Q12H, Brannon Holder MD, 10 mL at 12/17/23 2010    sodium chloride 0.9 % flush 10 mL, 10 mL, Intravenous, PRN, Brannon Holder MD    sodium chloride 0.9 % infusion 40 mL, 40 mL, Intravenous, PRN, Brannon Holder MD    sodium chloride 0.9 % infusion, 100 mL/hr, Intravenous, Continuous, Brannon Holder MD, Last Rate: 100 mL/hr at 12/18/23 0833, 100 mL/hr at 12/18/23 0833      Objective     Vital Signs  Temp:  [97.2 °F (36.2 °C)-98.7 °F (37.1 °C)] 98.1 °F (36.7 °C)  Heart Rate:  [78-94] 78  Resp:  [18] 18  BP: (103-129)/(48-87) 103/48  Body mass index is 23.17 kg/m².    Intake/Output Summary (Last 24 hours) at 12/18/2023 0848  Last data filed at 12/18/2023 0447  Gross per 24 hour   Intake 210 ml   Output 500 ml   Net -290 ml     No intake/output data recorded.     Physical Exam:   General: patient awake, alert and cooperative   Eyes: Normal lids and  lashes, no scleral icterus   Neck: supple, normal ROM   Skin: warm and dry, not jaundiced   Cardiovascular: regular rhythm and rate   Pulm: regular and unlabored   Abdomen: soft, nontender, nondistended; normal bowel sounds   Extremities: no rash or edema     Results Review:     I reviewed the patient's new clinical results.    Results from last 7 days   Lab Units 12/18/23  0512 12/17/23  0426 12/14/23  0411   WBC 10*3/mm3 16.46* 18.98* 10.25   HEMOGLOBIN g/dL 10.7* 14.0 10.8*   HEMATOCRIT % 31.4* 40.4 33.3*   PLATELETS 10*3/mm3 302 333 296     Results from last 7 days   Lab Units 12/18/23  0512 12/17/23  0513 12/14/23 0411 12/13/23 2113 12/13/23  1008   SODIUM mmol/L 139 137 137   < > 136   POTASSIUM mmol/L 3.2* 3.1* 3.6   < > 3.9   CHLORIDE mmol/L 109* 105 110*   < > 107   CO2 mmol/L 17.9* 18.0* 16.5*   < > 14.0*   BUN mg/dL 16 15 15   < > 22   CREATININE mg/dL 1.04 1.09 1.05   < > 1.35*   CALCIUM mg/dL 7.5* 8.3* 8.0*   < > 9.0   BILIRUBIN mg/dL 0.2 0.2  --   --  0.3   ALK PHOS U/L 76 88  --   --  91   ALT (SGPT) U/L 9 11  --   --  13   AST (SGOT) U/L 11 6  --   --  13   GLUCOSE mg/dL 79 99 90   < > 109*    < > = values in this interval not displayed.         Lab Results   Lab Value Date/Time    LIPASE 16 12/13/2023 1008    LIPASE 11 12/11/2023 1316       Radiology:  No orders to display       Assessment & Plan     Active Hospital Problems    Diagnosis     **Diarrhea     Major depression, single episode     Anemia     Benign essential hypertension     Hyperlipidemia        Assessment:  Diarrhea -TSH normal, celiac panel pending  Dehydration  Poor appetite with weight loss  Leukocytosis    All problems are new to me today     Plan:  C. difficile normal, GI PCR pending, await results  Follow stool output-it appears to be slowing as he has not had any further episodes of diarrhea so far today since 4:30 AM  Continue IV fluid rehydration  Nutrition is following  Encourage dairy free diet which may help constipation  although will not likely totally resolve it  Monitor hemoglobin.  Drop in hemoglobin may be dilutional, per nursing there have been no signs of overt GI bleeding.  Please notify GI team of any changes in clinical status.    Patient plan of care discussed with attending, Dr. Ruby Paul PA-C

## 2023-12-18 NOTE — CONSULTS
"Nutrition Services    Patient Name:  Jewel Best  YOB: 1940  MRN: [id number]  Admit Date:  12/17/2023  Assessment Date:  12/18/23    Summary: Nutrition consult per MD  This is a 82 yo male admitted for c/o N/V/D, weakness, failure to thrive in adult, and Hypokalemia. Pt reports decrease appetite and wt loss  Labs K 3.2, alb 2.8  IVF's at 100  BMI 23, wt 161,     Patient meets ASPEN/AND criteria for nutrition diagnosis of severe malnutrition of chronic illness based on: wt loss, decrease appetite, and NFPE results.    MD has placed him on a Dairy free diet-due to pt reporting he was drinking many glasses of milk a day and having regular ice cream at home. MD feels this may be contributing to his diarrhea.    Pt ate about 50% of breakfast this am. Encouraged good po intake with all meals and food preferences obtained. Will offer Boost Breeze supplement daily and monitor po intake.    RD to follow    CLINICAL NUTRITION ASSESSMENT      Reason for Assessment Physician Consult     Diagnosis/Problem   N/V/D, weakness, failure to thrive in adult, and Hypokalemia.    Medical/Surgical History Past Medical History:   Diagnosis Date    Anemia     Anxiety     Bladder cancer     Depression     Eating disorder     Hyperlipidemia     Hypertension     Lymphoma        Past Surgical History:   Procedure Laterality Date    COLONOSCOPY      KNEE SURGERY      bakers cyst removal        Anthropometrics        Current Height  Current Weight  BMI kg/m2 Height: 177.8 cm (70\")  Weight: 73.3 kg (161 lb 8.1 oz) (12/17/23 1404)  Body mass index is 23.17 kg/m².   Adjusted BMI (if applicable)    BMI Category Normal/Healthy (18.4 - 24.9)   Ideal Body Weight (IBW) 130   Usual Body Weight (UBW) 180   Weight Trend Loss   Weight History Wt Readings from Last 30 Encounters:   12/17/23 1404 73.3 kg (161 lb 8.1 oz)   12/17/23 1037 73.3 kg (161 lb 8.1 oz)   12/17/23 0404 74.8 kg (165 lb)   12/13/23 1440 74.9 kg (165 lb 3.2 oz) "   12/13/23 1345 74.9 kg (165 lb 3.2 oz)   12/13/23 1011 72.6 kg (160 lb)   11/08/23 1418 78 kg (172 lb)   11/02/23 1232 78 kg (172 lb)   10/18/23 1427 78 kg (172 lb)   09/27/23 1622 77.1 kg (170 lb)   09/07/23 1414 78 kg (172 lb)   08/25/23 2046 80.7 kg (178 lb)   07/24/23 1349 80.7 kg (178 lb)   05/26/23 1605 78.9 kg (174 lb)   05/15/23 1503 82.6 kg (182 lb)   01/09/23 1404 84.4 kg (186 lb)   10/05/22 1130 83.4 kg (183 lb 12.8 oz)   06/09/22 1026 82.8 kg (182 lb 9.6 oz)   02/17/22 1352 81.2 kg (179 lb)   12/27/21 1536 81.2 kg (179 lb)   12/08/21 1128 81.2 kg (179 lb)   11/24/21 1338 82.6 kg (182 lb)   11/15/21 1515 84.4 kg (186 lb)   09/17/21 1049 83.5 kg (184 lb)   09/25/20 1140 85 kg (187 lb 6.4 oz)   08/10/20 1337 87.5 kg (193 lb)   12/16/19 1426 88.5 kg (195 lb)   05/29/19 1405 82.6 kg (182 lb 3.2 oz)   03/21/19 1415 86.5 kg (190 lb 9.6 oz)   07/30/18 2023 81.6 kg (180 lb)   05/30/18 1136 85.5 kg (188 lb 9.6 oz)   12/13/17 0758 87.1 kg (192 lb)   10/04/17 1602 84.4 kg (186 lb)   09/28/16 1027 87.1 kg (192 lb)      --  Labs       Pertinent Labs    Results from last 7 days   Lab Units 12/18/23  0512 12/17/23  0513 12/14/23  0411 12/13/23  2113 12/13/23  1008   SODIUM mmol/L 139 137 137   < > 136   POTASSIUM mmol/L 3.2* 3.1* 3.6   < > 3.9   CHLORIDE mmol/L 109* 105 110*   < > 107   CO2 mmol/L 17.9* 18.0* 16.5*   < > 14.0*   BUN mg/dL 16 15 15   < > 22   CREATININE mg/dL 1.04 1.09 1.05   < > 1.35*   CALCIUM mg/dL 7.5* 8.3* 8.0*   < > 9.0   BILIRUBIN mg/dL 0.2 0.2  --   --  0.3   ALK PHOS U/L 76 88  --   --  91   ALT (SGPT) U/L 9 11  --   --  13   AST (SGOT) U/L 11 6  --   --  13   GLUCOSE mg/dL 79 99 90   < > 109*    < > = values in this interval not displayed.     Results from last 7 days   Lab Units 12/18/23  0512 12/17/23  0513 12/14/23  0411 12/13/23  1008   MAGNESIUM mg/dL  --  1.7  --  1.8   HEMOGLOBIN g/dL 10.7*  --    < > 13.1   HEMATOCRIT % 31.4*  --    < > 40.0   WBC 10*3/mm3 16.46*  --    < > 17.05*  "  ALBUMIN g/dL 2.8* 3.7  --  3.8    < > = values in this interval not displayed.     Results from last 7 days   Lab Units 12/18/23  0512 12/17/23  0426 12/14/23  0411 12/13/23  1008 12/11/23  1316   PLATELETS 10*3/mm3 302 333 296 255 270     COVID19   Date Value Ref Range Status   08/25/2023 Detected (C) Not Detected - Ref. Range Final     No results found for: \"HGBA1C\"       Medications           Scheduled Medications sodium chloride, 10 mL, Intravenous, Q12H       Infusions sodium chloride, 100 mL/hr, Last Rate: 100 mL/hr (12/18/23 0833)       PRN Medications   influenza vaccine    ondansetron    [COMPLETED] Insert Peripheral IV **AND** sodium chloride    sodium chloride    sodium chloride     Physical Findings          General Findings alert   Oral/Mouth Cavity tooth or teeth missing   Edema  no edema   Gastrointestinal last bowel movement: 12/18   Skin  excoriation   Tubes/Drains/Lines none   NFPE See Malnutrition Severity Assessment, Date Completed: 12/18   --  Malnutrition Severity Assessment      Patient meets criteria for : Severe Malnutrition  Malnutrition Type (last 8 hours)       Malnutrition Severity Assessment       Row Name 12/18/23 0903       Malnutrition Severity Assessment    Malnutrition Type Chronic Disease - Related Malnutrition      Row Name 12/18/23 0903       Insufficient Energy Intake     Insufficient Energy Intake Findings Severe    Insufficient Energy Intake  <75% of est. energy requirement for > or equal to 1 month      Row Name 12/18/23 0903       Unintentional Weight Loss     Unintentional Weight Loss Findings Severe    Unintentional Weight Loss  Weight loss greater than 10% in six months      Row Name 12/18/23 0903       Muscle Loss    Temple Region Moderate - slight depression    Clavicle Bone Region Moderate - some protrusion in females, visible in males    Patellar Region Moderate - patella more prominent, less muscle definition around patella      Row Name 12/18/23 0903       Fat " Loss    Orbital Region  Moderate -  somewhat hollowness, slightly dark circles    Upper Arm Region Moderate - some fat tissue, not ample      Row Name 12/18/23 0903       Declining Functional Status    Declining Functional Status Findings Measurably Reduced      Row Name 12/18/23 0903       Criteria Met (Must meet criteria for severity in at least 2 of these categories: M Wasting, Fat Loss, Fluid, Secondary Signs, Wt. Status, Intake)    Patient meets criteria for  Severe Malnutrition                       Estimated/Assessed Needs        Current Weight  Weight: 73.3 kg (161 lb 8.1 oz) (12/17/23 1404)       Energy Requirements    Weight for Calculation 73.3 kg   Method for Estimation  30 kcal/kg   EST Needs (kcal/day) 2199       Protein Requirements    Weight for Calculation 73.3 kg   EST Protein Needs (g/kg) 1.0 gm/kg   EST Daily Needs (g/day) 73       Fluid Requirements     Method for Estimation 1 mL/kcal    EST Needs (mL/day)      Current Nutrition Orders & Evaluation of Intake       Oral Nutrition     Food Allergies NKFA   Current PO Diet Diet: Gastrointestinal Diets, Cardiac Diets; Healthy Heart (2-3 Na+); Lactose-Controlled; Texture: Regular Texture (IDDSI 7); Fluid Consistency: Thin (IDDSI 0)   Supplement n/a   PO Evaluation     % PO Intake 50%    Factors Affecting Intake: diarrhea, nausea, vomiting, weakness   --  PES STATEMENT / NUTRITION DIAGNOSIS      Nutrition Dx Problem  Problem: Malnutrition (severe) and Inadequate Oral Intake  Etiology: Medical Diagnosis - N/V/D, failure to thrive    Signs/Symptoms: Report/Observation     NUTRITION INTERVENTION / PLAN OF CARE      Intervention Goal(s) Maintain nutrition status, Reduce/improve symptoms, Meet estimated needs, Disease management/therapy, Tolerate PO , Increase intake, Maintain weight, and PO intake goal %: 75+         RD Intervention/Action Interview for preferences, Encourage intake, Continue to monitor, Care plan reviewed, and Recommend/order: ONS   --       Prescription/Orders:       PO Diet       Supplements Boost Breeze daily      Enteral Nutrition       Parenteral Nutrition    New Prescription Ordered? Yes   --      Monitor/Evaluation Per protocol   Discharge Plan/Needs Pending clinical course   --    RD to follow per protocol.      Electronically signed by:  Meli Portillo RD  12/18/23 08:59 EST

## 2023-12-18 NOTE — PLAN OF CARE
Goal Outcome Evaluation:  Plan of Care Reviewed With: patient           Outcome Evaluation: Pt is an 84 yo male admitted with diarrhea, nausea/vomiting and frequent urination. Pt sitting EOB upon arrival and is agreeable to evaluation. He lives alone, reports daily use of stairs within the home and denies any use of AD or falls. Today he appears to be functioning at his baseline. He completed all mobility with sba with PT assisting with IV pole. No balance or safety deficits observed. Pt does not appear to have any ongoing skilled PT needs, PT signing off at this time. Anticipate return home at d/c.      Anticipated Discharge Disposition (PT): home

## 2023-12-18 NOTE — PLAN OF CARE
Goal Outcome Evaluation:  Plan of Care Reviewed With: grandchild(tip)        Progress: no change       One episode of watery diarrhea so far this shift. Lactose restricted diet reviewed with patient and daughter. Adequate oral intake. No nausea reported this shift. . Electrolytes replaced per protocol. Safe report completed for verbal threat to staff. Patient spoke with this RN as well as unit manager. Reviewed the [facility] zero violence policy with patient and he voiced understanding and apologized for behavior. Patient states he wants to leave AMA if he has to stay here much longer. Call out to MD to review plan. Hopeful discharge tomorrow when testing results. No other acute changes this shift.

## 2023-12-18 NOTE — PLAN OF CARE
Problem: Malnutrition  Goal: Improved Nutritional Intake  Outcome: Ongoing, Progressing     Problem: Oral Intake Inadequate  Goal: Improved Oral Intake  Outcome: Ongoing, Progressing   Goal Outcome Evaluation:         Good po intake encouraged  Supplements offered  RD to follow

## 2023-12-19 PROBLEM — A08.31 GASTROENTERITIS DUE TO SAPOVIRUS: Status: ACTIVE | Noted: 2023-12-19

## 2023-12-19 LAB
ALBUMIN SERPL-MCNC: 2.8 G/DL (ref 3.5–5.2)
ANION GAP SERPL CALCULATED.3IONS-SCNC: 9 MMOL/L (ref 5–15)
BUN SERPL-MCNC: 9 MG/DL (ref 8–23)
BUN/CREAT SERPL: 8.7 (ref 7–25)
CALCIUM SPEC-SCNC: 8 MG/DL (ref 8.6–10.5)
CHLORIDE SERPL-SCNC: 108 MMOL/L (ref 98–107)
CO2 SERPL-SCNC: 20 MMOL/L (ref 22–29)
CREAT SERPL-MCNC: 1.03 MG/DL (ref 0.76–1.27)
DEPRECATED RDW RBC AUTO: 43.1 FL (ref 37–54)
EGFRCR SERPLBLD CKD-EPI 2021: 71.2 ML/MIN/1.73
ERYTHROCYTE [DISTWIDTH] IN BLOOD BY AUTOMATED COUNT: 13.6 % (ref 12.3–15.4)
GLIADIN PEPTIDE IGA SER-ACNC: 3 UNITS (ref 0–19)
GLIADIN PEPTIDE IGG SER-ACNC: 2 UNITS (ref 0–19)
GLUCOSE SERPL-MCNC: 102 MG/DL (ref 65–99)
HCT VFR BLD AUTO: 33.8 % (ref 37.5–51)
HGB BLD-MCNC: 11.6 G/DL (ref 13–17.7)
IGA SERPL-MCNC: 207 MG/DL (ref 61–437)
MAGNESIUM SERPL-MCNC: 1.5 MG/DL (ref 1.6–2.4)
MCH RBC QN AUTO: 30.2 PG (ref 26.6–33)
MCHC RBC AUTO-ENTMCNC: 34.3 G/DL (ref 31.5–35.7)
MCV RBC AUTO: 88 FL (ref 79–97)
PHOSPHATE SERPL-MCNC: 1.9 MG/DL (ref 2.5–4.5)
PHOSPHATE SERPL-MCNC: 2.7 MG/DL (ref 2.5–4.5)
PLATELET # BLD AUTO: 317 10*3/MM3 (ref 140–450)
PMV BLD AUTO: 9.9 FL (ref 6–12)
POTASSIUM SERPL-SCNC: 3.8 MMOL/L (ref 3.5–5.2)
RBC # BLD AUTO: 3.84 10*6/MM3 (ref 4.14–5.8)
SODIUM SERPL-SCNC: 137 MMOL/L (ref 136–145)
TTG IGA SER-ACNC: <2 U/ML (ref 0–3)
TTG IGG SER-ACNC: <2 U/ML (ref 0–5)
WBC NRBC COR # BLD AUTO: 17.16 10*3/MM3 (ref 3.4–10.8)

## 2023-12-19 PROCEDURE — 83735 ASSAY OF MAGNESIUM: CPT | Performed by: INTERNAL MEDICINE

## 2023-12-19 PROCEDURE — 25810000003 LACTATED RINGERS PER 1000 ML: Performed by: INTERNAL MEDICINE

## 2023-12-19 PROCEDURE — 85027 COMPLETE CBC AUTOMATED: CPT | Performed by: INTERNAL MEDICINE

## 2023-12-19 PROCEDURE — 25010000002 MAGNESIUM SULFATE 2 GM/50ML SOLUTION: Performed by: HOSPITALIST

## 2023-12-19 PROCEDURE — 99232 SBSQ HOSP IP/OBS MODERATE 35: CPT

## 2023-12-19 PROCEDURE — 25010000002 ONDANSETRON PER 1 MG: Performed by: INTERNAL MEDICINE

## 2023-12-19 PROCEDURE — 25810000003 SODIUM CHLORIDE 0.9 % SOLUTION: Performed by: HOSPITALIST

## 2023-12-19 PROCEDURE — 84100 ASSAY OF PHOSPHORUS: CPT | Performed by: HOSPITALIST

## 2023-12-19 PROCEDURE — 80069 RENAL FUNCTION PANEL: CPT | Performed by: INTERNAL MEDICINE

## 2023-12-19 RX ORDER — MIRTAZAPINE 15 MG/1
15 TABLET, FILM COATED ORAL NIGHTLY
Status: DISCONTINUED | OUTPATIENT
Start: 2023-12-19 | End: 2023-12-21

## 2023-12-19 RX ORDER — MAGNESIUM SULFATE HEPTAHYDRATE 40 MG/ML
2 INJECTION, SOLUTION INTRAVENOUS
Status: COMPLETED | OUTPATIENT
Start: 2023-12-19 | End: 2023-12-19

## 2023-12-19 RX ORDER — FENTANYL/ROPIVACAINE/NS/PF 2-625MCG/1
15 PLASTIC BAG, INJECTION (ML) EPIDURAL ONCE
Status: COMPLETED | OUTPATIENT
Start: 2023-12-19 | End: 2023-12-19

## 2023-12-19 RX ORDER — CHOLESTYRAMINE 4 G/9G
1 POWDER, FOR SUSPENSION ORAL EVERY 12 HOURS SCHEDULED
Status: DISCONTINUED | OUTPATIENT
Start: 2023-12-19 | End: 2023-12-25 | Stop reason: HOSPADM

## 2023-12-19 RX ADMIN — CHOLESTYRAMINE 1 PACKET: 4 POWDER, FOR SUSPENSION ORAL at 20:19

## 2023-12-19 RX ADMIN — CHOLESTYRAMINE 1 PACKET: 4 POWDER, FOR SUSPENSION ORAL at 15:51

## 2023-12-19 RX ADMIN — Medication 15 MMOL: at 14:21

## 2023-12-19 RX ADMIN — Medication 10 ML: at 08:53

## 2023-12-19 RX ADMIN — Medication 10 ML: at 20:20

## 2023-12-19 RX ADMIN — SODIUM CHLORIDE, POTASSIUM CHLORIDE, SODIUM LACTATE AND CALCIUM CHLORIDE 100 ML/HR: 600; 310; 30; 20 INJECTION, SOLUTION INTRAVENOUS at 01:13

## 2023-12-19 RX ADMIN — MAGNESIUM SULFATE HEPTAHYDRATE 2 G: 40 INJECTION, SOLUTION INTRAVENOUS at 17:30

## 2023-12-19 RX ADMIN — ONDANSETRON 4 MG: 2 INJECTION INTRAMUSCULAR; INTRAVENOUS at 22:28

## 2023-12-19 RX ADMIN — MAGNESIUM SULFATE HEPTAHYDRATE 2 G: 40 INJECTION, SOLUTION INTRAVENOUS at 12:01

## 2023-12-19 RX ADMIN — MAGNESIUM SULFATE HEPTAHYDRATE 2 G: 40 INJECTION, SOLUTION INTRAVENOUS at 10:33

## 2023-12-19 RX ADMIN — ONDANSETRON 4 MG: 2 INJECTION INTRAMUSCULAR; INTRAVENOUS at 00:16

## 2023-12-19 RX ADMIN — MIRTAZAPINE 15 MG: 15 TABLET, FILM COATED ORAL at 20:19

## 2023-12-19 NOTE — PLAN OF CARE
Goal Outcome Evaluation:  Plan of Care Reviewed With: patient        Progress: no change  Outcome Evaluation: Patient alert but confused to place and situation. Patient having diarhea with 10 charted during the shift.  Patient becoming more aggitated and irritable with staff while doing care for patient.  One experience of nausia during the shift resolved by zofran.  GI panel came back with positive for sapovirus and LHA informed.  VSS.  WCTM for the rest of the shift.

## 2023-12-19 NOTE — PROGRESS NOTES
[facility]  Inpatient Progress Note    Reason for Follow Up: Persistent diarrhea    Subjective     Interval History:   Patient seen and examined at bedside this morning with daughter present with patient permission.  He reports that he had a terrible night last night and says he believes he had close to 10 episodes of diarrhea.  GI PCR returned positive for sapovirus.  Patient seems frustrated with the fact that he is not feeling better yet.  Today he reports loss of appetite.  Daughter says that he has not had an appetite for several months.    WBC continues to be elevated, now at 17.16.  Hemoglobin 11.6.      Current Facility-Administered Medications:     Calcium Replacement - Follow Nurse / BPA Driven Protocol, , Does not apply, PRN, Osvaldo Benton MD    Influenza Vac High-Dose Quad (FLUZONE HIGH DOSE) injection 0.7 mL, 0.7 mL, Intramuscular, During Hospitalization, Brannon Holder MD    lactated ringers infusion, 100 mL/hr, Intravenous, Continuous, Osvaldo Benton MD, Last Rate: 100 mL/hr at 12/19/23 0113, 100 mL/hr at 12/19/23 0113    Magnesium Standard Dose Replacement - Follow Nurse / BPA Driven Protocol, , Does not apply, Serenity SAGE Patrick D, MD    melatonin tablet 3 mg, 3 mg, Oral, Nightly PRN, Sada Gill APRN, 3 mg at 12/18/23 2300    ondansetron (ZOFRAN) injection 4 mg, 4 mg, Intravenous, Q6H PRN, Brannon Hodler MD, 4 mg at 12/19/23 0016    Phosphorus Replacement - Follow Nurse / BPA Driven Protocol, , Does not apply, Serenity SAGE Patrick D, MD    Potassium Replacement - Follow Nurse / BPA Driven Protocol, , Does not apply, Serenity SAGE Patrick D, MD    [COMPLETED] Insert Peripheral IV, , , Once **AND** sodium chloride 0.9 % flush 10 mL, 10 mL, Intravenous, PRN, Siva Flynn MD    sodium chloride 0.9 % flush 10 mL, 10 mL, Intravenous, Q12H, Brannon Holder MD, 10 mL at 12/19/23 0853    sodium chloride 0.9 % flush 10 mL, 10 mL, Intravenous, PRN,  Brannon Holder MD    sodium chloride 0.9 % infusion 40 mL, 40 mL, Intravenous, PRN, Brannon Holder MD      Objective     Vital Signs  Temp:  [97.7 °F (36.5 °C)-98.1 °F (36.7 °C)] 97.9 °F (36.6 °C)  Heart Rate:  [86-92] 89  Resp:  [18] 18  BP: (110-134)/(55-64) 134/59  Body mass index is 23.17 kg/m².    Intake/Output Summary (Last 24 hours) at 12/19/2023 0958  Last data filed at 12/19/2023 0739  Gross per 24 hour   Intake --   Output 200 ml   Net -200 ml     I/O this shift:  In: -   Out: 200 [Urine:200]     Physical Exam:   General: patient awake, alert and cooperative   Eyes: Normal lids and lashes, no scleral icterus   Neck: supple, normal ROM   Skin: warm and dry, not jaundiced   Cardiovascular: regular rhythm and rate   Pulm: regular and unlabored   Abdomen: soft, nontender, nondistended; normal bowel sounds     Results Review:     I reviewed the patient's new clinical results.    Results from last 7 days   Lab Units 12/19/23  0853 12/18/23  0512 12/17/23  0426   WBC 10*3/mm3 17.16* 16.46* 18.98*   HEMOGLOBIN g/dL 11.6* 10.7* 14.0   HEMATOCRIT % 33.8* 31.4* 40.4   PLATELETS 10*3/mm3 317 302 333     Results from last 7 days   Lab Units 12/18/23  1814 12/18/23  0512 12/17/23  0513 12/14/23  0411 12/13/23  2113 12/13/23  1008   SODIUM mmol/L  --  139 137 137   < > 136   POTASSIUM mmol/L 3.4* 3.2* 3.1* 3.6   < > 3.9   CHLORIDE mmol/L  --  109* 105 110*   < > 107   CO2 mmol/L  --  17.9* 18.0* 16.5*   < > 14.0*   BUN mg/dL  --  16 15 15   < > 22   CREATININE mg/dL  --  1.04 1.09 1.05   < > 1.35*   CALCIUM mg/dL  --  7.5* 8.3* 8.0*   < > 9.0   BILIRUBIN mg/dL  --  0.2 0.2  --   --  0.3   ALK PHOS U/L  --  76 88  --   --  91   ALT (SGPT) U/L  --  9 11  --   --  13   AST (SGOT) U/L  --  11 6  --   --  13   GLUCOSE mg/dL  --  79 99 90   < > 109*    < > = values in this interval not displayed.         Lab Results   Lab Value Date/Time    LIPASE 16 12/13/2023 1008    LIPASE 11 12/11/2023 1316       Radiology:  No  orders to display       Assessment & Plan     Active Hospital Problems    Diagnosis     **Diarrhea     Severe malnutrition     Major depression, single episode     Anemia     Benign essential hypertension     Hyperlipidemia        Assessment:  Diarrhea-GI PCR positive for Sapovirus  Dehydration  Poor appetite with weight loss  Leukocytosis      Plan:  PCR positive for sapovirus -continue supportive care  Follow stool output.  This appeared to be improving yesterday, but today patient reports he had multiple episodes of diarrhea overnight.  Will start him on Questran to slow this down  Continue IV fluid rehydration, monitor electrolytes per primary  Nutrition is following, hopefully as fluid is replaced and electrolytes corrected and he begins to feel better he will be able to tolerate more food  Encourage dairy free diet which may help diarrhea although will not likely totally resolve it  Monitor hemoglobin.  Downtrend in hemoglobin likely dilutional as there have been no signs of overt GI bleeding per nursing.  Please notify GI team of any changes in clinical status    Patient plan of care discussed with attending, Dr. Ruby Paul PA-C

## 2023-12-19 NOTE — PROGRESS NOTES
patient was seen 23- original note recovered from revision history:         Name: Jewel Best ADMIT: 2023   : 1940  PCP: Ernie Panda MD    MRN: [id number] LOS: 1 days   AGE/SEX: 83 y.o. male  ROOM: [facility]      Subjective      Subjective   He says uses diarrhea every hour but none this morning. Complaining of no appetite and weight loss for months. He is saying that his diarrhea will return. Diarrhea has been going on for 3 weeks. Daughter is at bedside.        Objective      Objective   Vital Signs  Temp:  [97.7 °F (36.5 °C)-98.1 °F (36.7 °C)] 97.9 °F (36.6 °C)  Heart Rate:  [86-92] 89  Resp:  [18] 18  BP: (110-134)/(55-64) 134/59  SpO2:  [96 %-100 %] 96 %  on   ;   Device (Oxygen Therapy): room air  Body mass index is 23.17 kg/m².     Physical Exam  Vitals and nursing note reviewed.   Constitutional:       General: He is not in acute distress.     Appearance: He is ill-appearing (chronically). He is not diaphoretic.   Eyes:      General: No scleral icterus.  Cardiovascular:      Rate and Rhythm: Normal rate and regular rhythm.      Pulses: Normal pulses.   Pulmonary:      Effort: Pulmonary effort is normal.      Breath sounds: No wheezing.   Abdominal:      General: There is no distension.      Palpations: Abdomen is soft.      Tenderness: There is no abdominal tenderness. There is no guarding or rebound.   Musculoskeletal:         General: No swelling or tenderness.   Skin:     General: Skin is warm and dry.   Neurological:      Mental Status: He is alert.      Cranial Nerves: No cranial nerve deficit.   Psychiatric:         Mood and Affect: Mood is depressed.         Behavior: Behavior normal.         Results Review  I reviewed the patient's new clinical results.             Results from last 7 days   Lab Units 23  0853 23  0512 23  0426 23  0411   WBC 10*3/mm3 17.16* 16.46* 18.98* 10.25   HEMOGLOBIN g/dL 11.6* 10.7* 14.0 10.8*   PLATELETS 10*3/mm3 317  "302 333 296               Results from last 7 days   Lab Units 12/19/23  0853 12/18/23 1814 12/18/23 0512 12/17/23 0513 12/14/23 0411   SODIUM mmol/L 137  --  139 137 137   POTASSIUM mmol/L 3.8 3.4* 3.2* 3.1* 3.6   CHLORIDE mmol/L 108*  --  109* 105 110*   CO2 mmol/L 20.0*  --  17.9* 18.0* 16.5*   BUN mg/dL 9  --  16 15 15   CREATININE mg/dL 1.03  --  1.04 1.09 1.05   GLUCOSE mg/dL 102*  --  79 99 90   EGFR mL/min/1.73 71.2  --  70.4 66.5 69.6              Results from last 7 days   Lab Units 12/19/23 0853 12/18/23 0512 12/17/23 0513 12/13/23  1008   ALBUMIN g/dL 2.8* 2.8* 3.7 3.8   BILIRUBIN mg/dL  --  0.2 0.2 0.3   ALK PHOS U/L  --  76 88 91   AST (SGOT) U/L  --  11 6 13   ALT (SGPT) U/L  --  9 11 13                 Results from last 7 days   Lab Units 12/19/23 0853 12/18/23 1814 12/18/23 0512 12/17/23 0513 12/14/23 0411 12/13/23 2113 12/13/23  1008   CALCIUM mg/dL 8.0*  --  7.5* 8.3* 8.0*   < > 9.0   ALBUMIN g/dL 2.8*  --  2.8* 3.7  --   --  3.8   MAGNESIUM mg/dL 1.5*  --  1.7 1.7  --   --  1.8   PHOSPHORUS mg/dL 1.9* 1.9* 2.2*  --   --   --   --     < > = values in this interval not displayed.           Results from last 7 days   Lab Units 12/17/23 0513   PROCALCITONIN ng/mL 0.13   LACTATE mmol/L 1.3      No results found for: \"HGBA1C\", \"POCGLU\"     No radiology results for the last day     Scheduled Meds  magnesium sulfate, 2 g, Intravenous, Q2H  potassium phosphate, 15 mmol, Intravenous, Once  sodium chloride, 10 mL, Intravenous, Q12H     Continuous Infusions  lactated ringers, 100 mL/hr, Last Rate: 100 mL/hr (12/19/23 0113)     PRN Meds    Calcium Replacement - Follow Nurse / BPA Driven Protocol    influenza vaccine    Magnesium Standard Dose Replacement - Follow Nurse / BPA Driven Protocol    melatonin    ondansetron    Phosphorus Replacement - Follow Nurse / BPA Driven Protocol    Potassium Replacement - Follow Nurse / BPA Driven Protocol    [COMPLETED] Insert Peripheral IV **AND** sodium " chloride    sodium chloride    sodium chloride     Diet: Gastrointestinal Diets, Cardiac Diets; Healthy Heart (2-3 Na+); Lactose-Controlled; Texture: Regular Texture (IDDSI 7); Fluid Consistency: Thin (IDDSI 0)     I have personally reviewed:  [x]  Medications  []  Laboratory   [x]  Microbiology   []  Radiology   []  EKG/Telemetry   []  Cardiology/Vascular   []  Pathology   []  Records            Assessment/Plan            Active Hospital Problems     Diagnosis   POA    **Diarrhea [R19.7]   Yes    Gastroenteritis due to sapovirus [A08.31]   Unknown    Severe malnutrition [E43]   Yes    Major depression, single episode [F32.9]   Yes    Anemia [D64.9]   Yes    Benign essential hypertension [I10]   Yes    Hyperlipidemia [E78.5]   Yes       Resolved Hospital Problems   No resolved problems to display.         83 y.o. male admitted with Diarrhea.     Diarrhea  Sapovirus positive  Continue supportive care including IVF  Gastroenterology following     Leukocytosis  Continue to monitor suspect is reactive  No fever. Procalcitonin and lactate were not elevated  No other infectious complaints  Blood cultures negative at 2 days     Weight loss (20 lbs over a year)  Anorexia  Malnutrition  Weight loss and anorexia seems to be more of a chronic problem and may be associated with depression  Correcting electrolytes     Depression  History of waxing and waning confusion August admission- underlying dementia suspected  He states he does have depression lacks motivation  Add Remeron to see if that will help his appetite also  Consult psychiatry     Right middle lobe nodule  Discussed with patient, family at bedside-need for follow-up imaging. Past smoker     Hypertension  BP is fine     Discharge  Probably home with home health  Expected Discharge Date: 12/20/2023; Expected Discharge Time:       Manish Lakhwinder Garcia MD  [facility]  12/19/23

## 2023-12-19 NOTE — PLAN OF CARE
Problem: Adult Inpatient Plan of Care  Goal: Plan of Care Review  Outcome: Ongoing, Progressing  Goal: Patient-Specific Goal (Individualized)  Outcome: Ongoing, Progressing  Goal: Absence of Hospital-Acquired Illness or Injury  Outcome: Ongoing, Progressing  Intervention: Identify and Manage Fall Risk  Recent Flowsheet Documentation  Taken 12/19/2023 1600 by Diego Kendrick RN  Safety Promotion/Fall Prevention:   activity supervised   clutter free environment maintained   fall prevention program maintained   lighting adjusted   nonskid shoes/slippers when out of bed   room organization consistent   safety round/check completed  Taken 12/19/2023 1400 by Diego Kendrick RN  Safety Promotion/Fall Prevention:   activity supervised   clutter free environment maintained   fall prevention program maintained   lighting adjusted   nonskid shoes/slippers when out of bed   room organization consistent   safety round/check completed  Taken 12/19/2023 1200 by Diego Kendrick RN  Safety Promotion/Fall Prevention:   activity supervised   clutter free environment maintained   fall prevention program maintained   lighting adjusted   nonskid shoes/slippers when out of bed   room organization consistent   safety round/check completed  Taken 12/19/2023 1000 by Diego Kendrick RN  Safety Promotion/Fall Prevention:   activity supervised   clutter free environment maintained   fall prevention program maintained   lighting adjusted   nonskid shoes/slippers when out of bed   room organization consistent   safety round/check completed  Taken 12/19/2023 0800 by Diego Kendrick RN  Safety Promotion/Fall Prevention:   clutter free environment maintained   activity supervised   fall prevention program maintained   lighting adjusted   nonskid shoes/slippers when out of bed   room organization consistent   safety round/check completed  Intervention: Prevent Infection  Recent Flowsheet Documentation  Taken 12/19/2023 1600 by Diego Kendrick  RN  Infection Prevention:   rest/sleep promoted   single patient room provided   hand hygiene promoted  Taken 12/19/2023 1400 by Diego Kendrick RN  Infection Prevention:   hand hygiene promoted   rest/sleep promoted   single patient room provided  Taken 12/19/2023 1200 by Diego Kendrick RN  Infection Prevention:   hand hygiene promoted   rest/sleep promoted   single patient room provided  Taken 12/19/2023 1000 by Diego Kendrick RN  Infection Prevention:   hand hygiene promoted   rest/sleep promoted   single patient room provided  Taken 12/19/2023 0800 by Diego Kendrick RN  Infection Prevention:   hand hygiene promoted   rest/sleep promoted   single patient room provided  Goal: Optimal Comfort and Wellbeing  Outcome: Ongoing, Progressing  Intervention: Provide Person-Centered Care  Recent Flowsheet Documentation  Taken 12/19/2023 1600 by Diego Kendrick RN  Trust Relationship/Rapport:   care explained   thoughts/feelings acknowledged   reassurance provided   questions encouraged   questions answered   empathic listening provided  Taken 12/19/2023 0800 by Diego Kendrick RN  Trust Relationship/Rapport:   care explained   thoughts/feelings acknowledged   reassurance provided   questions encouraged   questions answered   empathic listening provided  Goal: Readiness for Transition of Care  Outcome: Ongoing, Progressing     Problem: Electrolyte Imbalance  Goal: Electrolyte Balance  Outcome: Ongoing, Progressing     Problem: Fall Injury Risk  Goal: Absence of Fall and Fall-Related Injury  Outcome: Ongoing, Progressing  Intervention: Identify and Manage Contributors  Recent Flowsheet Documentation  Taken 12/19/2023 1600 by Diego Kendrick RN  Medication Review/Management: medications reviewed  Taken 12/19/2023 1400 by Diego Kendrick RN  Medication Review/Management: medications reviewed  Taken 12/19/2023 1200 by Diego Kendrick RN  Medication Review/Management: medications reviewed  Taken 12/19/2023 1000 by Diego Kendrick  RN  Medication Review/Management: medications reviewed  Taken 12/19/2023 0800 by Diego Kendrick RN  Medication Review/Management: medications reviewed  Intervention: Promote Injury-Free Environment  Recent Flowsheet Documentation  Taken 12/19/2023 1600 by Diego Kendrick RN  Safety Promotion/Fall Prevention:   activity supervised   clutter free environment maintained   fall prevention program maintained   lighting adjusted   nonskid shoes/slippers when out of bed   room organization consistent   safety round/check completed  Taken 12/19/2023 1400 by Diego Kendrick RN  Safety Promotion/Fall Prevention:   activity supervised   clutter free environment maintained   fall prevention program maintained   lighting adjusted   nonskid shoes/slippers when out of bed   room organization consistent   safety round/check completed  Taken 12/19/2023 1200 by Diego Kendrick RN  Safety Promotion/Fall Prevention:   activity supervised   clutter free environment maintained   fall prevention program maintained   lighting adjusted   nonskid shoes/slippers when out of bed   room organization consistent   safety round/check completed  Taken 12/19/2023 1000 by Diego Kendrick RN  Safety Promotion/Fall Prevention:   activity supervised   clutter free environment maintained   fall prevention program maintained   lighting adjusted   nonskid shoes/slippers when out of bed   room organization consistent   safety round/check completed  Taken 12/19/2023 0800 by Diego Kendrick RN  Safety Promotion/Fall Prevention:   clutter free environment maintained   activity supervised   fall prevention program maintained   lighting adjusted   nonskid shoes/slippers when out of bed   room organization consistent   safety round/check completed     Problem: Malnutrition  Goal: Improved Nutritional Intake  Outcome: Ongoing, Progressing     Problem: Oral Intake Inadequate  Goal: Improved Oral Intake  Outcome: Ongoing, Progressing   Goal Outcome Evaluation:          Patient in room sitting up in chair. Patient has been more compliant with care since daughter came and spoke with patient. Mag and phos replaced this shift. Patient has expressed that he is ready to go back home tomorrow. Daughter has also expressed some concern about behavioral issues and patient going back to home by himself. Denies all pain at this time. Call light in reach.

## 2023-12-19 NOTE — CASE MANAGEMENT/SOCIAL WORK
Continued Stay Note  [facility]     Patient Name: Jewel Best  MRN: [id number]  Today's Date: 12/19/2023    Admit Date: 12/17/2023    Plan: Home vs home w/ HH   Discharge Plan       Row Name 12/19/23 1023       Plan    Plan Home vs home w/ HH    Plan Comments CCP spoke with patient and daughter at bedside regarding updates on the dc plan. PT signed off. Patient and daughter to discuss HH further and get back with CCP if they would like a referral. CCP explained different levels of care: assisted living, memory care, personal care, independent living, skilled nursing, and long term care. Daughter and patient are agreeable to referral to [facility] to discuss options. CCP left [facility] for Madhuri Mccauley/[facility] ([phone]) for referral. Patient and daughter deny any further discharge needs at this time. JC JENKINS                   Discharge Codes    No documentation.                 Expected Discharge Date and Time       Expected Discharge Date Expected Discharge Time    Dec 20, 2023               JC Newsome     TELE RN NOTES



MOVED TO ROOM 309-1 AS ROOM MATE FROM ROOM 311 NEEDS ISOLATION AND PER HER FAMILY'S REQUEST.

## 2023-12-20 ENCOUNTER — APPOINTMENT (OUTPATIENT)
Dept: CT IMAGING | Facility: HOSPITAL | Age: 83
DRG: 391 | End: 2023-12-20
Payer: MEDICARE

## 2023-12-20 LAB
ANION GAP SERPL CALCULATED.3IONS-SCNC: 10.9 MMOL/L (ref 5–15)
BUN SERPL-MCNC: 7 MG/DL (ref 8–23)
BUN/CREAT SERPL: 6.9 (ref 7–25)
CALCIUM SPEC-SCNC: 8.1 MG/DL (ref 8.6–10.5)
CHLORIDE SERPL-SCNC: 108 MMOL/L (ref 98–107)
CO2 SERPL-SCNC: 19.1 MMOL/L (ref 22–29)
CREAT SERPL-MCNC: 1.02 MG/DL (ref 0.76–1.27)
DEPRECATED RDW RBC AUTO: 43.3 FL (ref 37–54)
EGFRCR SERPLBLD CKD-EPI 2021: 72 ML/MIN/1.73
ERYTHROCYTE [DISTWIDTH] IN BLOOD BY AUTOMATED COUNT: 13.7 % (ref 12.3–15.4)
GLUCOSE SERPL-MCNC: 115 MG/DL (ref 65–99)
HCT VFR BLD AUTO: 37.6 % (ref 37.5–51)
HCT VFR BLD AUTO: 37.6 % (ref 37.5–51)
HEMOCCULT STL QL: POSITIVE
HGB BLD-MCNC: 12.8 G/DL (ref 13–17.7)
HGB BLD-MCNC: 13.1 G/DL (ref 13–17.7)
MAGNESIUM SERPL-MCNC: 2.8 MG/DL (ref 1.6–2.4)
MCH RBC QN AUTO: 30.6 PG (ref 26.6–33)
MCHC RBC AUTO-ENTMCNC: 34.8 G/DL (ref 31.5–35.7)
MCV RBC AUTO: 87.9 FL (ref 79–97)
PHOSPHATE SERPL-MCNC: 2.2 MG/DL (ref 2.5–4.5)
PHOSPHATE SERPL-MCNC: 2.6 MG/DL (ref 2.5–4.5)
PLATELET # BLD AUTO: 301 10*3/MM3 (ref 140–450)
PMV BLD AUTO: 10.2 FL (ref 6–12)
POTASSIUM SERPL-SCNC: 3.6 MMOL/L (ref 3.5–5.2)
POTASSIUM SERPL-SCNC: 3.7 MMOL/L (ref 3.5–5.2)
RBC # BLD AUTO: 4.28 10*6/MM3 (ref 4.14–5.8)
SODIUM SERPL-SCNC: 138 MMOL/L (ref 136–145)
WBC NRBC COR # BLD AUTO: 19.89 10*3/MM3 (ref 3.4–10.8)

## 2023-12-20 PROCEDURE — 25010000002 ONDANSETRON PER 1 MG: Performed by: INTERNAL MEDICINE

## 2023-12-20 PROCEDURE — 25010000002 PROCHLORPERAZINE 10 MG/2ML SOLUTION: Performed by: HOSPITALIST

## 2023-12-20 PROCEDURE — 82272 OCCULT BLD FECES 1-3 TESTS: CPT | Performed by: HOSPITALIST

## 2023-12-20 PROCEDURE — 83735 ASSAY OF MAGNESIUM: CPT | Performed by: HOSPITALIST

## 2023-12-20 PROCEDURE — 84132 ASSAY OF SERUM POTASSIUM: CPT | Performed by: HOSPITALIST

## 2023-12-20 PROCEDURE — 85014 HEMATOCRIT: CPT | Performed by: HOSPITALIST

## 2023-12-20 PROCEDURE — 80048 BASIC METABOLIC PNL TOTAL CA: CPT | Performed by: HOSPITALIST

## 2023-12-20 PROCEDURE — 25010000002 POTASSIUM CHLORIDE 10 MEQ/100ML SOLUTION: Performed by: HOSPITALIST

## 2023-12-20 PROCEDURE — 85027 COMPLETE CBC AUTOMATED: CPT | Performed by: HOSPITALIST

## 2023-12-20 PROCEDURE — 71250 CT THORAX DX C-: CPT

## 2023-12-20 PROCEDURE — 84100 ASSAY OF PHOSPHORUS: CPT | Performed by: HOSPITALIST

## 2023-12-20 PROCEDURE — 85018 HEMOGLOBIN: CPT | Performed by: HOSPITALIST

## 2023-12-20 PROCEDURE — 99232 SBSQ HOSP IP/OBS MODERATE 35: CPT | Performed by: INTERNAL MEDICINE

## 2023-12-20 PROCEDURE — 25810000003 SODIUM CHLORIDE 0.9 % SOLUTION: Performed by: HOSPITALIST

## 2023-12-20 RX ORDER — POTASSIUM CHLORIDE 7.45 MG/ML
10 INJECTION INTRAVENOUS
Status: COMPLETED | OUTPATIENT
Start: 2023-12-20 | End: 2023-12-20

## 2023-12-20 RX ORDER — FENTANYL/ROPIVACAINE/NS/PF 2-625MCG/1
15 PLASTIC BAG, INJECTION (ML) EPIDURAL ONCE
Status: COMPLETED | OUTPATIENT
Start: 2023-12-20 | End: 2023-12-20

## 2023-12-20 RX ORDER — PROCHLORPERAZINE EDISYLATE 5 MG/ML
5 INJECTION INTRAMUSCULAR; INTRAVENOUS EVERY 6 HOURS PRN
Status: DISCONTINUED | OUTPATIENT
Start: 2023-12-20 | End: 2023-12-25 | Stop reason: HOSPADM

## 2023-12-20 RX ADMIN — PROCHLORPERAZINE EDISYLATE 5 MG: 5 INJECTION INTRAMUSCULAR; INTRAVENOUS at 09:14

## 2023-12-20 RX ADMIN — ONDANSETRON 4 MG: 2 INJECTION INTRAMUSCULAR; INTRAVENOUS at 03:54

## 2023-12-20 RX ADMIN — CHOLESTYRAMINE 1 PACKET: 4 POWDER, FOR SUSPENSION ORAL at 20:28

## 2023-12-20 RX ADMIN — Medication 10 ML: at 20:28

## 2023-12-20 RX ADMIN — POTASSIUM CHLORIDE 10 MEQ: 7.46 INJECTION, SOLUTION INTRAVENOUS at 07:03

## 2023-12-20 RX ADMIN — POTASSIUM CHLORIDE 10 MEQ: 7.46 INJECTION, SOLUTION INTRAVENOUS at 10:35

## 2023-12-20 RX ADMIN — Medication 10 ML: at 08:41

## 2023-12-20 RX ADMIN — POTASSIUM CHLORIDE 10 MEQ: 7.46 INJECTION, SOLUTION INTRAVENOUS at 11:25

## 2023-12-20 RX ADMIN — POTASSIUM PHOSPHATE, MONOBASIC POTASSIUM PHOSPHATE, DIBASIC 15 MMOL: 224; 236 INJECTION, SOLUTION, CONCENTRATE INTRAVENOUS at 07:46

## 2023-12-20 RX ADMIN — POTASSIUM CHLORIDE 10 MEQ: 7.46 INJECTION, SOLUTION INTRAVENOUS at 08:39

## 2023-12-20 RX ADMIN — PROCHLORPERAZINE EDISYLATE 5 MG: 5 INJECTION INTRAMUSCULAR; INTRAVENOUS at 17:03

## 2023-12-20 RX ADMIN — MIRTAZAPINE 15 MG: 15 TABLET, FILM COATED ORAL at 20:28

## 2023-12-20 NOTE — NURSING NOTE
Pt had 2 bouts with nausea and vomiting this shift.  Zofran was given both times and pt states this made him feel better.  Each time, emesis was small amount and colorless/ clear.  Pt vitals remain stable, NAD, [facility].

## 2023-12-20 NOTE — PROGRESS NOTES
Name: Jewel Best ADMIT: 2023   : 1940  PCP: Ernie Panda MD    MRN: [id number] LOS: 2 days   AGE/SEX: 83 y.o. male  ROOM: [facility]     Subjective   Subjective   Diarrhea last night. Again just now. Had nausea and vomiting this morning improved with Compazine. He thinks he has stomach cancer.     Objective   Objective   Vital Signs  Temp:  [97.4 °F (36.3 °C)-98.1 °F (36.7 °C)] 97.9 °F (36.6 °C)  Heart Rate:  [] 100  Resp:  [18] 18  BP: (108-119)/(55-64) 119/59  SpO2:  [97 %] 97 %  on   ;   Device (Oxygen Therapy): room air  Body mass index is 23.17 kg/m².    Physical Exam  Vitals and nursing note reviewed.   Constitutional:       General: He is not in acute distress.     Appearance: He is ill-appearing (chronically). He is not diaphoretic.   Eyes:      General: No scleral icterus.  Cardiovascular:      Rate and Rhythm: Normal rate and regular rhythm.      Pulses: Normal pulses.   Pulmonary:      Effort: Pulmonary effort is normal.      Breath sounds: No wheezing.   Abdominal:      General: There is no distension.      Palpations: Abdomen is soft.      Tenderness: There is no abdominal tenderness. There is no guarding or rebound.   Musculoskeletal:         General: No swelling or tenderness.   Skin:     General: Skin is warm and dry.   Neurological:      Mental Status: He is alert.      Cranial Nerves: No cranial nerve deficit.   Psychiatric:         Mood and Affect: Mood is depressed.         Behavior: Behavior normal.       Results Review  I reviewed the patient's new clinical results.    Results from last 7 days   Lab Units 23  0521 23  0853 23  0512 23  0426   WBC 10*3/mm3 19.89* 17.16* 16.46* 18.98*   HEMOGLOBIN g/dL 13.1 11.6* 10.7* 14.0   PLATELETS 10*3/mm3 301 317 302 333     Results from last 7 days   Lab Units 23  0521 23  0853 23  1814 23  0512 23  0513   SODIUM mmol/L 138 137  --  139 137   POTASSIUM mmol/L 3.6 3.8  "3.4* 3.2* 3.1*   CHLORIDE mmol/L 108* 108*  --  109* 105   CO2 mmol/L 19.1* 20.0*  --  17.9* 18.0*   BUN mg/dL 7* 9  --  16 15   CREATININE mg/dL 1.02 1.03  --  1.04 1.09   GLUCOSE mg/dL 115* 102*  --  79 99   EGFR mL/min/1.73 72.0 71.2  --  70.4 66.5     Results from last 7 days   Lab Units 12/19/23  0853 12/18/23  0512 12/17/23  0513   ALBUMIN g/dL 2.8* 2.8* 3.7   BILIRUBIN mg/dL  --  0.2 0.2   ALK PHOS U/L  --  76 88   AST (SGOT) U/L  --  11 6   ALT (SGPT) U/L  --  9 11     Results from last 7 days   Lab Units 12/20/23  0521 12/19/23 2053 12/19/23 0853 12/18/23 1814 12/18/23  0512 12/18/23  0512 12/17/23  0513   CALCIUM mg/dL 8.1*  --  8.0*  --   --  7.5* 8.3*   ALBUMIN g/dL  --   --  2.8*  --   --  2.8* 3.7   MAGNESIUM mg/dL 2.8*  --  1.5*  --   --  1.7 1.7   PHOSPHORUS mg/dL 2.2* 2.7 1.9* 1.9*   < > 2.2*  --     < > = values in this interval not displayed.     Results from last 7 days   Lab Units 12/17/23 0513   PROCALCITONIN ng/mL 0.13   LACTATE mmol/L 1.3     No results found for: \"HGBA1C\", \"POCGLU\"    No radiology results for the last day    Scheduled Meds  cholestyramine, 1 packet, Oral, Q12H  mirtazapine, 15 mg, Oral, Nightly  potassium chloride, 10 mEq, Intravenous, Q1H  potassium phosphate, 15 mmol, Intravenous, Once  sodium chloride, 10 mL, Intravenous, Q12H    Continuous Infusions  lactated ringers, 75 mL/hr, Last Rate: 100 mL/hr (12/19/23 0113)    PRN Meds    Calcium Replacement - Follow Nurse / BPA Driven Protocol    influenza vaccine    Magnesium Standard Dose Replacement - Follow Nurse / BPA Driven Protocol    melatonin    ondansetron    Phosphorus Replacement - Follow Nurse / BPA Driven Protocol    Potassium Replacement - Follow Nurse / BPA Driven Protocol    prochlorperazine    [COMPLETED] Insert Peripheral IV **AND** sodium chloride    sodium chloride    sodium chloride    Diet: Gastrointestinal Diets, Cardiac Diets; Healthy Heart (2-3 Na+); Lactose-Controlled; Texture: Regular Texture " (IDDSI 7); Fluid Consistency: Thin (IDDSI 0)    I have personally reviewed:  [x]  Medications  [x]  Laboratory   []  Microbiology   []  Radiology   [x]  EKG/Telemetry   []  Cardiology/Vascular   []  Pathology   []  Records        Assessment/Plan     Active Hospital Problems    Diagnosis  POA    **Diarrhea [R19.7]  Yes    Gastroenteritis due to sapovirus [A08.31]  Unknown    Severe malnutrition [E43]  Yes    Major depression, single episode [F32.9]  Yes    Anemia [D64.9]  Yes    Benign essential hypertension [I10]  Yes    Hyperlipidemia [E78.5]  Yes      Resolved Hospital Problems   No resolved problems to display.       83 y.o. male admitted with Diarrhea.    Diarrhea  Sapovirus positive  Continue supportive care including IVF  Refused Questran this morning due to nausea/vomiting  He is now worried about stomach cancer-d/w GI APC they will see today    Leukocytosis  Continue to monitor suspect is reactive due to above  Remains afebrile. Procalcitonin and lactate were not elevated  No other infectious complaints  Blood cultures negative at 3 days. C. difficile negative.    Weight loss (20 lbs over a year)  Anorexia  Malnutrition  Weight loss and anorexia seems to be more of a chronic problem and may be associated with depression  Correcting electrolytes    Depression  History of waxing and waning confusion August admission- underlying dementia suspected  He states he does have depression and lacks motivation  Add Remeron to see if that will help his appetite also  Consulted psychiatry    Right middle lobe nodule seen on abdomen pelvis CT  Discussed with patient 12/19/2023 need for follow-up imaging-while here will get formal CT of the chest. Past smoker    Hypertension  BP is fine    Discharge  Probably home with home health, timing TBD  Expected discharge date/ time has not been documented.     Discussed with patient and nursing staff     Manish Garcia MD  [facility]  12/20/23

## 2023-12-20 NOTE — PLAN OF CARE
Problem: Adult Inpatient Plan of Care  Goal: Plan of Care Review  Outcome: Ongoing, Progressing  Goal: Patient-Specific Goal (Individualized)  Outcome: Ongoing, Progressing  Goal: Absence of Hospital-Acquired Illness or Injury  Outcome: Ongoing, Progressing  Intervention: Identify and Manage Fall Risk  Recent Flowsheet Documentation  Taken 12/20/2023 1400 by Diego Kendrick RN  Safety Promotion/Fall Prevention:   activity supervised   clutter free environment maintained   fall prevention program maintained   lighting adjusted   nonskid shoes/slippers when out of bed   room organization consistent   safety round/check completed  Taken 12/20/2023 1200 by Diego Kendrick RN  Safety Promotion/Fall Prevention:   activity supervised   clutter free environment maintained   fall prevention program maintained   lighting adjusted   nonskid shoes/slippers when out of bed   room organization consistent   safety round/check completed  Taken 12/20/2023 1000 by Diego Kendrick RN  Safety Promotion/Fall Prevention:   activity supervised   clutter free environment maintained   fall prevention program maintained   lighting adjusted   nonskid shoes/slippers when out of bed   room organization consistent   safety round/check completed  Taken 12/20/2023 0800 by Diego Kendrick RN  Safety Promotion/Fall Prevention:   activity supervised   clutter free environment maintained   fall prevention program maintained   lighting adjusted   nonskid shoes/slippers when out of bed   room organization consistent   safety round/check completed  Intervention: Prevent Infection  Recent Flowsheet Documentation  Taken 12/20/2023 1400 by Diego Kendrick RN  Infection Prevention:   rest/sleep promoted   single patient room provided   hand hygiene promoted  Taken 12/20/2023 1200 by Diego Kendrick RN  Infection Prevention:   hand hygiene promoted   rest/sleep promoted   single patient room provided  Taken 12/20/2023 1000 by Diego Kendrick RN  Infection  Prevention:   hand hygiene promoted   rest/sleep promoted   single patient room provided  Taken 12/20/2023 0800 by Diego Kendrick RN  Infection Prevention:   hand hygiene promoted   rest/sleep promoted   single patient room provided  Goal: Optimal Comfort and Wellbeing  Outcome: Ongoing, Progressing  Intervention: Provide Person-Centered Care  Recent Flowsheet Documentation  Taken 12/20/2023 0800 by Diego Kendrick RN  Trust Relationship/Rapport:   care explained   thoughts/feelings acknowledged   reassurance provided   questions encouraged   questions answered   empathic listening provided  Goal: Readiness for Transition of Care  Outcome: Ongoing, Progressing     Problem: Electrolyte Imbalance  Goal: Electrolyte Balance  Outcome: Ongoing, Progressing     Problem: Fall Injury Risk  Goal: Absence of Fall and Fall-Related Injury  Outcome: Ongoing, Progressing  Intervention: Identify and Manage Contributors  Recent Flowsheet Documentation  Taken 12/20/2023 1400 by Diego Kendrick RN  Medication Review/Management: medications reviewed  Taken 12/20/2023 1200 by Diego Kendrick RN  Medication Review/Management: medications reviewed  Taken 12/20/2023 1000 by Diego Kendrick RN  Medication Review/Management: medications reviewed  Taken 12/20/2023 0800 by Diego Kendrick RN  Medication Review/Management: medications reviewed  Intervention: Promote Injury-Free Environment  Recent Flowsheet Documentation  Taken 12/20/2023 1400 by Diego Kendrick RN  Safety Promotion/Fall Prevention:   activity supervised   clutter free environment maintained   fall prevention program maintained   lighting adjusted   nonskid shoes/slippers when out of bed   room organization consistent   safety round/check completed  Taken 12/20/2023 1200 by Diego Kendrick RN  Safety Promotion/Fall Prevention:   activity supervised   clutter free environment maintained   fall prevention program maintained   lighting adjusted   nonskid shoes/slippers when out of bed    room organization consistent   safety round/check completed  Taken 12/20/2023 1000 by Diego Kendrick RN  Safety Promotion/Fall Prevention:   activity supervised   clutter free environment maintained   fall prevention program maintained   lighting adjusted   nonskid shoes/slippers when out of bed   room organization consistent   safety round/check completed  Taken 12/20/2023 0800 by Diego Kendrick RN  Safety Promotion/Fall Prevention:   activity supervised   clutter free environment maintained   fall prevention program maintained   lighting adjusted   nonskid shoes/slippers when out of bed   room organization consistent   safety round/check completed     Problem: Malnutrition  Goal: Improved Nutritional Intake  Outcome: Ongoing, Progressing     Problem: Oral Intake Inadequate  Goal: Improved Oral Intake  Outcome: Ongoing, Progressing     Problem: Skin Injury Risk Increased  Goal: Skin Health and Integrity  Outcome: Ongoing, Progressing   Goal Outcome Evaluation:         Patient in room laying in bed with eyes closed. Patient had 2 episodes of vomiting and 2 episodes of diarrhea this shift. Nausea and vomiting treated with compazine. Patient refuses to eat. Patient claims he thinks he has cancer. Denies pain at this time can claims to be comfortable. Clear liquid diet will start tomorrow. Call light in reach.

## 2023-12-20 NOTE — PROGRESS NOTES
[facility]  Inpatient Progress Note    Reason for Follow Up: Decreased appetite diarrhea    Subjective     Interval History:   Still not eating anything still with diarrhea, he does have Sapa virus    Current Facility-Administered Medications:     Calcium Replacement - Follow Nurse / BPA Driven Protocol, , Does not apply, Serenity SAGE Patrick D, MD    cholestyramine (QUESTRAN) packet 1 packet, 1 packet, Oral, Q12H, Ruby Warner MD, 1 packet at 12/19/23 2019    Influenza Vac High-Dose Quad (FLUZONE HIGH DOSE) injection 0.7 mL, 0.7 mL, Intramuscular, During Hospitalization, Brannon Holder MD    lactated ringers infusion, 75 mL/hr, Intravenous, Continuous, Manish Garcia MD, Last Rate: 75 mL/hr at 12/20/23 1125, 75 mL/hr at 12/20/23 1125    Magnesium Standard Dose Replacement - Follow Nurse / BPA Driven Protocol, , Does not apply, Serenity SAGE Patrick D, MD    melatonin tablet 3 mg, 3 mg, Oral, Nightly PRN, Sada Gill, APRN, 3 mg at 12/18/23 2300    mirtazapine (REMERON) tablet 15 mg, 15 mg, Oral, Nightly, Manish Garcia MD, 15 mg at 12/19/23 2019    ondansetron (ZOFRAN) injection 4 mg, 4 mg, Intravenous, Q6H PRN, Brannon Holder MD, 4 mg at 12/20/23 0354    Phosphorus Replacement - Follow Nurse / BPA Driven Protocol, , Does not apply, Serenity SAGE Patrick D, MD    Potassium Replacement - Follow Nurse / BPA Driven Protocol, , Does not apply, Serenity SAGE Patrick D, MD    prochlorperazine (COMPAZINE) injection 5 mg, 5 mg, Intravenous, Q6H PRN, Manish Garcia MD, 5 mg at 12/20/23 0914    [COMPLETED] Insert Peripheral IV, , , Once **AND** sodium chloride 0.9 % flush 10 mL, 10 mL, Intravenous, PRN, Siva Flynn MD    sodium chloride 0.9 % flush 10 mL, 10 mL, Intravenous, Q12H, Brannon Holder MD, 10 mL at 12/20/23 0841    sodium chloride 0.9 % flush 10 mL, 10 mL, Intravenous, PRN, Brannon Holder MD    sodium chloride 0.9 % infusion 40 mL, 40 mL,  Intravenous, PRN, Brannon Holder MD  Review of Systems:    There is weakness and fatigue all other systems reviewed and negative    Objective     Vital Signs  Temp:  [97.5 °F (36.4 °C)-98.1 °F (36.7 °C)] 97.8 °F (36.6 °C)  Heart Rate:  [] 100  Resp:  [18] 18  BP: (108-119)/(55-64) 108/64  Body mass index is 23.17 kg/m².    Intake/Output Summary (Last 24 hours) at 12/20/2023 1422  Last data filed at 12/20/2023 1306  Gross per 24 hour   Intake 180 ml   Output 400 ml   Net -220 ml     I/O this shift:  In: 0   Out: 300 [Urine:300]     Physical Exam:   General: patient awake, alert and cooperative   Eyes: Normal lids and lashes, no scleral icterus   Neck: supple, normal ROM   Skin: warm and dry, not jaundiced   Cardiovascular: regular rhythm and rate, no murmurs auscultated   Pulm: clear to auscultation bilaterally, regular and unlabored   Abdomen: soft, nontender, nondistended; normal bowel sounds   Extremities: no rash or edema   Psychiatric: Normal mood and behavior; memory intact     Results Review:     I reviewed the patient's new clinical results.    Results from last 7 days   Lab Units 12/20/23  0521 12/19/23  0853 12/18/23  0512   WBC 10*3/mm3 19.89* 17.16* 16.46*   HEMOGLOBIN g/dL 13.1 11.6* 10.7*   HEMATOCRIT % 37.6 33.8* 31.4*   PLATELETS 10*3/mm3 301 317 302     Results from last 7 days   Lab Units 12/20/23  0521 12/19/23  0853 12/18/23  1814 12/18/23  0512 12/17/23  0513   SODIUM mmol/L 138 137  --  139 137   POTASSIUM mmol/L 3.6 3.8 3.4* 3.2* 3.1*   CHLORIDE mmol/L 108* 108*  --  109* 105   CO2 mmol/L 19.1* 20.0*  --  17.9* 18.0*   BUN mg/dL 7* 9  --  16 15   CREATININE mg/dL 1.02 1.03  --  1.04 1.09   CALCIUM mg/dL 8.1* 8.0*  --  7.5* 8.3*   BILIRUBIN mg/dL  --   --   --  0.2 0.2   ALK PHOS U/L  --   --   --  76 88   ALT (SGPT) U/L  --   --   --  9 11   AST (SGOT) U/L  --   --   --  11 6   GLUCOSE mg/dL 115* 102*  --  79 99         Lab Results   Lab Value Date/Time    LIPASE 16 12/13/2023 1002     LIPASE 11 12/11/2023 1316       Radiology:  CT Chest Without Contrast Diagnostic   Final Result       1.  A moderate fluid-filled hiatal hernia has decreased in size from   12/13/2023, however, there is new adjacent small volume fluid and fat   stranding, which raises concern for possible hernia incarceration and/or   superimposed inflammation. Wall thickening of the esophagus proximal to   the hiatal hernia is incompletely assessed and could reflect reflux   esophagitis. However, recommend GI evaluation and follow-up upper   endoscopy.   2.  Fluid-filled stomach and partially imaged fluid-filled small bowel   loops are nonspecific but could potentially reflect a gastroenteritis in   the appropriate clinical setting.   3.  A few scattered pulmonary nodules measuring up to 0.6 cm in the   right middle lobe are nonspecific. Recommend follow-up CT chest in 3 to   6 months.   4.  Emphysema.                   This report was finalized on 12/20/2023 11:17 AM by Dr. Laura Woodruff M.D on Workstation: [id number]              Assessment & Plan     Active Hospital Problems    Diagnosis     **Diarrhea     Gastroenteritis due to sapovirus     Severe malnutrition     Major depression, single episode     Anemia     Benign essential hypertension     Hyperlipidemia        Assessment:  Diarrhea with Sapa virus  Dehydration  Poor appetite  Weight loss  Leukocytosis      Plan:  Supportive care for Sapa virus  He is worried about stomach cancer, there would be no contraindication to performing EGD  I am happy to perform colonoscopy during the same session to look for other findings that could cause diarrhea and weight loss such as microscopic or collagenous colitis, cancer etc.  Clear liquids to begin Thursday with colonoscopy and EGD Friday-I have not discussed this with the patient I will on Thursday morning    I discussed the patients findings and my recommendations with patient and nursing staff.    See Valdes MD

## 2023-12-21 LAB
ANION GAP SERPL CALCULATED.3IONS-SCNC: 11.4 MMOL/L (ref 5–15)
BUN SERPL-MCNC: 10 MG/DL (ref 8–23)
BUN/CREAT SERPL: 10.1 (ref 7–25)
CALCIUM SPEC-SCNC: 7.6 MG/DL (ref 8.6–10.5)
CALPROTECTIN STL-MCNT: 2340 UG/G (ref 0–120)
CHLORIDE SERPL-SCNC: 108 MMOL/L (ref 98–107)
CO2 SERPL-SCNC: 21.6 MMOL/L (ref 22–29)
CREAT SERPL-MCNC: 0.99 MG/DL (ref 0.76–1.27)
DEPRECATED RDW RBC AUTO: 42.7 FL (ref 37–54)
EGFRCR SERPLBLD CKD-EPI 2021: 74.7 ML/MIN/1.73
ERYTHROCYTE [DISTWIDTH] IN BLOOD BY AUTOMATED COUNT: 13.4 % (ref 12.3–15.4)
GLUCOSE SERPL-MCNC: 92 MG/DL (ref 65–99)
HCT VFR BLD AUTO: 36.5 % (ref 37.5–51)
HCT VFR BLD AUTO: 37 % (ref 37.5–51)
HCT VFR BLD AUTO: 37.5 % (ref 37.5–51)
HGB BLD-MCNC: 12.2 G/DL (ref 13–17.7)
HGB BLD-MCNC: 12.4 G/DL (ref 13–17.7)
HGB BLD-MCNC: 12.5 G/DL (ref 13–17.7)
MAGNESIUM SERPL-MCNC: 2 MG/DL (ref 1.6–2.4)
MCH RBC QN AUTO: 28.6 PG (ref 26.6–33)
MCHC RBC AUTO-ENTMCNC: 32.5 G/DL (ref 31.5–35.7)
MCV RBC AUTO: 87.8 FL (ref 79–97)
PHOSPHATE SERPL-MCNC: 2.1 MG/DL (ref 2.5–4.5)
PHOSPHATE SERPL-MCNC: 2.3 MG/DL (ref 2.5–4.5)
PLATELET # BLD AUTO: 270 10*3/MM3 (ref 140–450)
PMV BLD AUTO: 10.2 FL (ref 6–12)
POTASSIUM SERPL-SCNC: 3.5 MMOL/L (ref 3.5–5.2)
POTASSIUM SERPL-SCNC: 4.3 MMOL/L (ref 3.5–5.2)
PROCALCITONIN SERPL-MCNC: 0.13 NG/ML (ref 0–0.25)
RBC # BLD AUTO: 4.27 10*6/MM3 (ref 4.14–5.8)
SODIUM SERPL-SCNC: 141 MMOL/L (ref 136–145)
WBC NRBC COR # BLD AUTO: 19.2 10*3/MM3 (ref 3.4–10.8)

## 2023-12-21 PROCEDURE — 80048 BASIC METABOLIC PNL TOTAL CA: CPT | Performed by: HOSPITALIST

## 2023-12-21 PROCEDURE — 99232 SBSQ HOSP IP/OBS MODERATE 35: CPT | Performed by: INTERNAL MEDICINE

## 2023-12-21 PROCEDURE — 85014 HEMATOCRIT: CPT | Performed by: HOSPITALIST

## 2023-12-21 PROCEDURE — 85018 HEMOGLOBIN: CPT | Performed by: HOSPITALIST

## 2023-12-21 PROCEDURE — 83735 ASSAY OF MAGNESIUM: CPT | Performed by: HOSPITALIST

## 2023-12-21 PROCEDURE — 84100 ASSAY OF PHOSPHORUS: CPT | Performed by: HOSPITALIST

## 2023-12-21 PROCEDURE — 85027 COMPLETE CBC AUTOMATED: CPT | Performed by: HOSPITALIST

## 2023-12-21 PROCEDURE — 25810000003 LACTATED RINGERS PER 1000 ML: Performed by: HOSPITALIST

## 2023-12-21 PROCEDURE — 84132 ASSAY OF SERUM POTASSIUM: CPT | Performed by: HOSPITALIST

## 2023-12-21 PROCEDURE — 84145 PROCALCITONIN (PCT): CPT | Performed by: HOSPITALIST

## 2023-12-21 RX ORDER — MIRTAZAPINE 30 MG/1
30 TABLET, FILM COATED ORAL NIGHTLY
Status: DISCONTINUED | OUTPATIENT
Start: 2023-12-21 | End: 2023-12-25 | Stop reason: HOSPADM

## 2023-12-21 RX ORDER — POTASSIUM CHLORIDE 750 MG/1
40 TABLET, FILM COATED, EXTENDED RELEASE ORAL EVERY 4 HOURS
Status: COMPLETED | OUTPATIENT
Start: 2023-12-21 | End: 2023-12-21

## 2023-12-21 RX ORDER — POLYETHYLENE GLYCOL 3350 17 G/17G
0.5 POWDER, FOR SOLUTION ORAL EVERY 12 HOURS
Status: COMPLETED | OUTPATIENT
Start: 2023-12-21 | End: 2023-12-22

## 2023-12-21 RX ADMIN — POTASSIUM CHLORIDE 40 MEQ: 750 TABLET, EXTENDED RELEASE ORAL at 14:40

## 2023-12-21 RX ADMIN — Medication 2 PACKET: at 11:43

## 2023-12-21 RX ADMIN — POLYETHYLENE GLYCOL 3350 0.5 BOTTLE: 17 POWDER, FOR SOLUTION ORAL at 17:12

## 2023-12-21 RX ADMIN — SODIUM CHLORIDE, POTASSIUM CHLORIDE, SODIUM LACTATE AND CALCIUM CHLORIDE 75 ML/HR: 600; 310; 30; 20 INJECTION, SOLUTION INTRAVENOUS at 07:47

## 2023-12-21 RX ADMIN — POTASSIUM CHLORIDE 40 MEQ: 750 TABLET, EXTENDED RELEASE ORAL at 11:43

## 2023-12-21 RX ADMIN — Medication 10 ML: at 09:24

## 2023-12-21 RX ADMIN — CHOLESTYRAMINE 1 PACKET: 4 POWDER, FOR SUSPENSION ORAL at 09:24

## 2023-12-21 RX ADMIN — MIRTAZAPINE 30 MG: 30 TABLET, FILM COATED ORAL at 20:30

## 2023-12-21 NOTE — PROGRESS NOTES
[facility]  Inpatient Progress Note    Reason for Follow Up: Weight loss and diarrhea    Subjective     Interval History:   About the same    Current Facility-Administered Medications:     Calcium Replacement - Follow Nurse / BPA Driven Protocol, , Does not apply, PRN, Osvaldo Benton MD    cholestyramine (QUESTRAN) packet 1 packet, 1 packet, Oral, Q12H, Ruby Warner MD, 1 packet at 12/21/23 0924    Influenza Vac High-Dose Quad (FLUZONE HIGH DOSE) injection 0.7 mL, 0.7 mL, Intramuscular, During Hospitalization, Brannon Holder MD    lactated ringers infusion, 75 mL/hr, Intravenous, Continuous, Manish Garcia MD, Last Rate: 75 mL/hr at 12/21/23 0747, 75 mL/hr at 12/21/23 0747    Magnesium Standard Dose Replacement - Follow Nurse / BPA Driven Protocol, , Does not apply, PRN, Osvaldo Benton MD    melatonin tablet 3 mg, 3 mg, Oral, Nightly PRN, Sada Gill, APRN, 3 mg at 12/18/23 2300    mirtazapine (REMERON) tablet 30 mg, 30 mg, Oral, Nightly, Woodrow Finnegan III, MD    ondansetron (ZOFRAN) injection 4 mg, 4 mg, Intravenous, Q6H PRN, Brannon Holder MD, 4 mg at 12/20/23 0354    Phosphorus Replacement - Follow Nurse / BPA Driven Protocol, , Does not apply, Serenity SAGE Patrick D, MD    polyethylene glycol (MIRALAX) powder 0.5 bottle, 0.5 bottle, Oral, Q12H, See Valdes MD    Potassium Replacement - Follow Nurse / BPA Driven Protocol, , Does not apply, Serenity SAGE Patrick D, MD    prochlorperazine (COMPAZINE) injection 5 mg, 5 mg, Intravenous, Q6H PRN, Manish Garcia MD, 5 mg at 12/20/23 1703    [COMPLETED] Insert Peripheral IV, , , Once **AND** sodium chloride 0.9 % flush 10 mL, 10 mL, Intravenous, PRN, Siva Flynn MD    sodium chloride 0.9 % flush 10 mL, 10 mL, Intravenous, Q12H, Brannon Holder MD, 10 mL at 12/21/23 0924    sodium chloride 0.9 % flush 10 mL, 10 mL, Intravenous, PRN, Brannon Holder MD    sodium chloride 0.9 %  infusion 40 mL, 40 mL, Intravenous, PRN, Brannon Holder MD  Review of Systems:    There is weakness and fatigue all other systems reviewed and negative    Objective     Vital Signs  Temp:  [97.6 °F (36.4 °C)-98 °F (36.7 °C)] 97.6 °F (36.4 °C)  Heart Rate:  [83-99] 83  Resp:  [18] 18  BP: (104-124)/(54-80) 121/59  Body mass index is 23.17 kg/m².    Intake/Output Summary (Last 24 hours) at 12/21/2023 1515  Last data filed at 12/21/2023 1008  Gross per 24 hour   Intake 340 ml   Output 200 ml   Net 140 ml     I/O this shift:  In: 340 [P.O.:340]  Out: 200 [Urine:200]     Physical Exam:   General: patient awake, alert and cooperative   Eyes: Normal lids and lashes, no scleral icterus   Neck: supple, normal ROM   Skin: warm and dry, not jaundiced   Cardiovascular: regular rhythm and rate, no murmurs auscultated   Pulm: clear to auscultation bilaterally, regular and unlabored   Abdomen: soft, nontender, nondistended; normal bowel sounds   Extremities: no rash or edema   Psychiatric: Normal mood and behavior; memory intact     Results Review:     I reviewed the patient's new clinical results.    Results from last 7 days   Lab Units 12/21/23  0854 12/20/23  2356 12/20/23  1820 12/20/23  0521 12/19/23  0853   WBC 10*3/mm3 19.20*  --   --  19.89* 17.16*   HEMOGLOBIN g/dL 12.2* 12.5* 12.8* 13.1 11.6*   HEMATOCRIT % 37.5 37.0* 37.6 37.6 33.8*   PLATELETS 10*3/mm3 270  --   --  301 317     Results from last 7 days   Lab Units 12/21/23  0854 12/20/23  1636 12/20/23  0521 12/19/23  0853 12/18/23  1814 12/18/23  0512 12/17/23  0513   SODIUM mmol/L 141  --  138 137  --  139 137   POTASSIUM mmol/L 3.5 3.7 3.6 3.8   < > 3.2* 3.1*   CHLORIDE mmol/L 108*  --  108* 108*  --  109* 105   CO2 mmol/L 21.6*  --  19.1* 20.0*  --  17.9* 18.0*   BUN mg/dL 10  --  7* 9  --  16 15   CREATININE mg/dL 0.99  --  1.02 1.03  --  1.04 1.09   CALCIUM mg/dL 7.6*  --  8.1* 8.0*  --  7.5* 8.3*   BILIRUBIN mg/dL  --   --   --   --   --  0.2 0.2   ALK PHOS  U/L  --   --   --   --   --  76 88   ALT (SGPT) U/L  --   --   --   --   --  9 11   AST (SGOT) U/L  --   --   --   --   --  11 6   GLUCOSE mg/dL 92  --  115* 102*  --  79 99    < > = values in this interval not displayed.         Lab Results   Lab Value Date/Time    LIPASE 16 12/13/2023 1008    LIPASE 11 12/11/2023 1316       Radiology:  CT Chest Without Contrast Diagnostic   Final Result       1.  A moderate fluid-filled hiatal hernia has decreased in size from   12/13/2023, however, there is new adjacent small volume fluid and fat   stranding, which raises concern for possible hernia incarceration and/or   superimposed inflammation. Wall thickening of the esophagus proximal to   the hiatal hernia is incompletely assessed and could reflect reflux   esophagitis. However, recommend GI evaluation and follow-up upper   endoscopy.   2.  Fluid-filled stomach and partially imaged fluid-filled small bowel   loops are nonspecific but could potentially reflect a gastroenteritis in   the appropriate clinical setting.   3.  A few scattered pulmonary nodules measuring up to 0.6 cm in the   right middle lobe are nonspecific. Recommend follow-up CT chest in 3 to   6 months.   4.  Emphysema.                   This report was finalized on 12/20/2023 11:17 AM by Dr. Laura Woodruff M.D on Workstation: [id number]              Assessment & Plan     Active Hospital Problems    Diagnosis     **Diarrhea     Gastroenteritis due to sapovirus     Severe malnutrition     Major depression, single episode     Anemia     Benign essential hypertension     Hyperlipidemia        Assessment:  Diarrhea with Sapovirus  Dehydration  Poor appetite  Weight loss  Leukocytosis        Plan:  Supportive care for Sapovirus  He is worried about stomach cancer, there would be no contraindication to performing EGD  I am happy to perform colonoscopy during the same session to look for other findings that could cause diarrhea and weight loss such as microscopic or  collagenous colitis, cancer etc.  EGD and colonoscopy tomorrow morning     I discussed the patients findings and my recommendations with patient and nursing staff.    See Valdes MD

## 2023-12-21 NOTE — PROGRESS NOTES
Name: Jewel Best ADMIT: 2023   : 1940  PCP: rEnie Panda MD    MRN: [id number] LOS: 3 days   AGE/SEX: 83 y.o. male  ROOM: [facility]     Subjective   Subjective   Diarrhea better. Yesterday he had nausea and vomiting. None today. Does not seem as depressed..     Objective   Objective   Vital Signs  Temp:  [97.8 °F (36.6 °C)-98 °F (36.7 °C)] 98 °F (36.7 °C)  Heart Rate:  [91-99] 91  Resp:  [18] 18  BP: (104-124)/(54-80) 114/69  SpO2:  [94 %-97 %] 97 %  on   ;   Device (Oxygen Therapy): room air  Body mass index is 23.17 kg/m².    Physical Exam  Vitals and nursing note reviewed.   Constitutional:       General: He is not in acute distress.     Appearance: He is ill-appearing (chronically). He is not diaphoretic.   Eyes:      General: No scleral icterus.  Cardiovascular:      Rate and Rhythm: Normal rate and regular rhythm.      Pulses: Normal pulses.   Pulmonary:      Effort: Pulmonary effort is normal.      Breath sounds: No wheezing.   Abdominal:      General: There is no distension.      Palpations: Abdomen is soft.      Tenderness: There is no abdominal tenderness. There is no guarding or rebound.   Musculoskeletal:         General: No swelling or tenderness.   Skin:     General: Skin is warm and dry.   Neurological:      Mental Status: He is alert.      Cranial Nerves: No cranial nerve deficit.   Psychiatric:         Mood and Affect: Mood is not depressed.         Behavior: Behavior normal.       Results Review  I reviewed the patient's new clinical results.    Results from last 7 days   Lab Units 23  0854 23  2356 23  1820 23  0521 23  0853 23  0512   WBC 10*3/mm3 19.20*  --   --  19.89* 17.16* 16.46*   HEMOGLOBIN g/dL 12.2* 12.5* 12.8* 13.1 11.6* 10.7*   PLATELETS 10*3/mm3 270  --   --  301 317 302     Results from last 7 days   Lab Units 23  0854 23  1636 23  0521 23  0853 23  1814 23  0512   SODIUM mmol/L  "141  --  138 137  --  139   POTASSIUM mmol/L 3.5 3.7 3.6 3.8   < > 3.2*   CHLORIDE mmol/L 108*  --  108* 108*  --  109*   CO2 mmol/L 21.6*  --  19.1* 20.0*  --  17.9*   BUN mg/dL 10  --  7* 9  --  16   CREATININE mg/dL 0.99  --  1.02 1.03  --  1.04   GLUCOSE mg/dL 92  --  115* 102*  --  79   EGFR mL/min/1.73 74.7  --  72.0 71.2  --  70.4    < > = values in this interval not displayed.     Results from last 7 days   Lab Units 12/19/23  0853 12/18/23  0512 12/17/23  0513   ALBUMIN g/dL 2.8* 2.8* 3.7   BILIRUBIN mg/dL  --  0.2 0.2   ALK PHOS U/L  --  76 88   AST (SGOT) U/L  --  11 6   ALT (SGPT) U/L  --  9 11     Results from last 7 days   Lab Units 12/21/23  0854 12/20/23  1636 12/20/23  0521 12/19/23  2053 12/19/23  0853 12/18/23  1814 12/18/23  0512 12/17/23  0513   CALCIUM mg/dL 7.6*  --  8.1*  --  8.0*  --  7.5* 8.3*   ALBUMIN g/dL  --   --   --   --  2.8*  --  2.8* 3.7   MAGNESIUM mg/dL 2.0  --  2.8*  --  1.5*  --  1.7 1.7   PHOSPHORUS mg/dL 2.1* 2.6 2.2* 2.7 1.9*   < > 2.2*  --     < > = values in this interval not displayed.     Results from last 7 days   Lab Units 12/17/23  0513   PROCALCITONIN ng/mL 0.13   LACTATE mmol/L 1.3     No results found for: \"HGBA1C\", \"POCGLU\"    CT Chest Without Contrast Diagnostic    Result Date: 12/20/2023   1.  A moderate fluid-filled hiatal hernia has decreased in size from 12/13/2023, however, there is new adjacent small volume fluid and fat stranding, which raises concern for possible hernia incarceration and/or superimposed inflammation. Wall thickening of the esophagus proximal to the hiatal hernia is incompletely assessed and could reflect reflux esophagitis. However, recommend GI evaluation and follow-up upper endoscopy. 2.  Fluid-filled stomach and partially imaged fluid-filled small bowel loops are nonspecific but could potentially reflect a gastroenteritis in the appropriate clinical setting. 3.  A few scattered pulmonary nodules measuring up to 0.6 cm in the right " middle lobe are nonspecific. Recommend follow-up CT chest in 3 to 6 months. 4.  Emphysema.     This report was finalized on 12/20/2023 11:17 AM by Dr. Laura Woodruff M.D on Workstation: [id number]       Scheduled Meds  cholestyramine, 1 packet, Oral, Q12H  mirtazapine, 30 mg, Oral, Nightly  potassium chloride ER, 40 mEq, Oral, Q4H  sodium chloride, 10 mL, Intravenous, Q12H    Continuous Infusions  lactated ringers, 75 mL/hr, Last Rate: 75 mL/hr (12/21/23 0747)    PRN Meds    Calcium Replacement - Follow Nurse / BPA Driven Protocol    influenza vaccine    Magnesium Standard Dose Replacement - Follow Nurse / BPA Driven Protocol    melatonin    ondansetron    Phosphorus Replacement - Follow Nurse / BPA Driven Protocol    Potassium Replacement - Follow Nurse / BPA Driven Protocol    prochlorperazine    [COMPLETED] Insert Peripheral IV **AND** sodium chloride    sodium chloride    sodium chloride    Diet: Liquid Diets; Clear Liquid; Fluid Consistency: Thin (IDDSI 0)    I have personally reviewed:  [x]  Medications  [x]  Laboratory   []  Microbiology   []  Radiology   [x]  EKG/Telemetry   []  Cardiology/Vascular   []  Pathology   []  Records        Assessment/Plan     Active Hospital Problems    Diagnosis  POA    **Diarrhea [R19.7]  Yes    Gastroenteritis due to sapovirus [A08.31]  Unknown    Severe malnutrition [E43]  Yes    Major depression, single episode [F32.9]  Yes    Anemia [D64.9]  Yes    Benign essential hypertension [I10]  Yes    Hyperlipidemia [E78.5]  Yes      Resolved Hospital Problems   No resolved problems to display.       83 y.o. male admitted with Diarrhea.    Diarrhea  Nausea and vomiting  Sapovirus positive  Continue supportive care including IVF. He is feeling improved today  CT scan 12/20/2023 showing fluid and fat stranding around moderate hiatal hernia, esophageal wall thickening proximal to hiatal hernia  GI plans EGD and colonoscopy tomorrow.    Leukocytosis persists  Continue to monitor suspect is  reactive due to above  Remains afebrile. Procalcitonin and lactate were not elevated-will recheck procalcitonin  No other infectious complaints  Blood cultures negative at 3 days. C. difficile negative.    Weight loss (20 lbs over a year)  Anorexia  Malnutrition  Weight loss and anorexia seems to be more of a chronic problem and may be associated with depression  Correcting electrolytes  EGD and colonoscopy tomorrow    Depression  History of waxing and waning confusion August admission- underlying dementia suspected  Added Remeron to see if that will help his appetite also  Appreciate psychiatry-Remeron increased    Right middle lobe nodule seen on abdomen pelvis CT  CT of the chest showed emphysema and a few scattered pulmonary nodules up to 6 0.6 cm in the right middle lobe nonspecific. Follow-up CT chest 3 to 6 months.    Hypertension  BP is fine    Discharge  Probably home with home health, timing TBD  Expected discharge date/ time has not been documented.     Discussed with patient and nursing staff     Manish Garcia MD  [facility]  12/21/23

## 2023-12-21 NOTE — PLAN OF CARE
Goal Outcome Evaluation:  Plan of Care Reviewed With: patient           Outcome Evaluation: Patient has only had 1 bm this shift, soft not loose. Very plesent this AM and has woken up hungry and thirstry. Snack and fluids offered. VSS, WCTM, no complaints voiced.          [phone]

## 2023-12-21 NOTE — CASE MANAGEMENT/SOCIAL WORK
Continued Stay Note  [facility]     Patient Name: Jewel Best  MRN: [id number]  Today's Date: 12/21/2023    Admit Date: 12/17/2023    Plan: Home with pending HH   Discharge Plan       Row Name 12/21/23 0951       Plan    Plan Home with pending HH    Patient/Family in Agreement with Plan yes    Plan Comments CCP followed up with the patient’s daughter Meli Best. Discussed HH options. Provided daughter with a verbal list of HH companies. Daughter was agreeable to a referral to [facility]. CCP made a referral. CCP to follow.                   Discharge Codes    No documentation.

## 2023-12-21 NOTE — H&P
"IDENTIFYING INFORMATION: The patient is an 83-year-old white male admitted for persistent diarrhea.  He is seen related to a history of possible depression.    CHIEF COMPLAINT: None given    INFORMANT: Patient and chart    RELIABILITY: Fair    HISTORY OF PRESENT ILLNESS: The patient is an 83-year-old white male admitted with persistent diarrhea.  He has been treated in the past for depression with Remeron and reportedly takes Prozac \"as needed\" for depression.  When seen today the patient is denying any depressive symptoms though the chart indicates that the patient has complained of no appetite and has lost a great deal of weight over the past several months.  The chart also indicates that he has had lack of motivation and there has been some suspicion of possible dementia.  The patient lives alone.  He is a retired .  He has 2 daughters who live in the area.  The patient denies prior suicide attempts or gestures.  He denies current depressive symptoms and denies recent changes in sleep.  He denies abuse of any psychoactive substances.    PAST PSYCHIATRIC HISTORY: As noted previously the patient has been prescribed Remeron 15 mg nightly and Prozac 20 mg \"as needed\" by his primary care provider.    PAST MEDICAL HISTORY: Significant for history of anemia, bladder cancer, hyperlipidemia, hypertension and lymphoma    MEDICATIONS:   Current Facility-Administered Medications   Medication Dose Route Frequency Provider Last Rate Last Admin    Calcium Replacement - Follow Nurse / BPA Driven Protocol   Does not apply Osvaldo Krueger MD        cholestyramine (QUESTRAN) packet 1 packet  1 packet Oral Q12H Ruby Warner MD   1 packet at 12/21/23 0924    Influenza Vac High-Dose Quad (FLUZONE HIGH DOSE) injection 0.7 mL  0.7 mL Intramuscular During Hospitalization Brannon Holder MD        lactated ringers infusion  75 mL/hr Intravenous Continuous Manish Garcia MD 75 mL/hr at 12/21/23 0752 " 75 mL/hr at 12/21/23 0747    Magnesium Standard Dose Replacement - Follow Nurse / BPA Driven Protocol   Does not apply PRN Osvaldo Benton MD        melatonin tablet 3 mg  3 mg Oral Nightly PRN Sada Gill APRN   3 mg at 12/18/23 2300    mirtazapine (REMERON) tablet 30 mg  30 mg Oral Nightly Woodrow Finnegan III, MD        ondansetron (ZOFRAN) injection 4 mg  4 mg Intravenous Q6H PRN Brannon Holder MD   4 mg at 12/20/23 0354    Phosphorus Replacement - Follow Nurse / BPA Driven Protocol   Does not apply PRN Osvaldo Benton MD        potassium chloride (K-DUR,KLOR-CON) ER tablet 40 mEq  40 mEq Oral Q4H Manish Garcia MD   40 mEq at 12/21/23 1143    Potassium Replacement - Follow Nurse / BPA Driven Protocol   Does not apply PRN Osvaldo Benton MD        prochlorperazine (COMPAZINE) injection 5 mg  5 mg Intravenous Q6H PRN Manish Garcia MD   5 mg at 12/20/23 1703    sodium chloride 0.9 % flush 10 mL  10 mL Intravenous PRN Siva Flynn MD        sodium chloride 0.9 % flush 10 mL  10 mL Intravenous Q12H Brannon Holder MD   10 mL at 12/21/23 0924    sodium chloride 0.9 % flush 10 mL  10 mL Intravenous PRN Brannon Holder MD        sodium chloride 0.9 % infusion 40 mL  40 mL Intravenous PRN Brannon Holder MD             ALLERGIES: None    FAMILY HISTORY: Noncontributory    SOCIAL HISTORY: The patient is originally from [facility].  He is retired .  He denies abuse of any illicit substances.  He is  and lives alone.    MENTAL STATUS EXAM: The patient is an elderly white male appearing his stated age.  He is in no apparent physical distress at the time of examination.  He is awake alert and oriented x 3.  His mood is calm his affect blunted.  Speech is generally relevant and coherent.  No gross deficits in memory or cognition noted but the patient does not comply with formal testing of memory.  He denies current suicidal or homicidal  "ideation or psychotic features.  Judgement and insight appeared to be intact.    ASSETS/LIABILITIES: To be assessed    DIAGNOSTIC IMPRESSION: Major depressive disorder recurrent mild, medical problems as noted previously    PLAN: Rather than reinitiating Prozac, which the patient was taking \"as needed\" (which is not an effective way to administer this medication) I will optimize his Remeron dose at 30 mg nightly.  With regards to a possible early dementia it is difficult in a depressed patient to tell whether they are exhibiting signs of true dementia or pseudodementia associated with depression.  I will continue to monitor for this.  "

## 2023-12-21 NOTE — H&P (VIEW-ONLY)
[facility]  Inpatient Progress Note    Reason for Follow Up: Weight loss and diarrhea    Subjective     Interval History:   About the same    Current Facility-Administered Medications:     Calcium Replacement - Follow Nurse / BPA Driven Protocol, , Does not apply, PRN, Osvaldo Benton MD    cholestyramine (QUESTRAN) packet 1 packet, 1 packet, Oral, Q12H, Ruby Warner MD, 1 packet at 12/21/23 0924    Influenza Vac High-Dose Quad (FLUZONE HIGH DOSE) injection 0.7 mL, 0.7 mL, Intramuscular, During Hospitalization, Brannon Holder MD    lactated ringers infusion, 75 mL/hr, Intravenous, Continuous, Manish Garcia MD, Last Rate: 75 mL/hr at 12/21/23 0747, 75 mL/hr at 12/21/23 0747    Magnesium Standard Dose Replacement - Follow Nurse / BPA Driven Protocol, , Does not apply, PRN, Osvaldo Benton MD    melatonin tablet 3 mg, 3 mg, Oral, Nightly PRN, Sada Gill, APRN, 3 mg at 12/18/23 2300    mirtazapine (REMERON) tablet 30 mg, 30 mg, Oral, Nightly, Woodrow Finnegan III, MD    ondansetron (ZOFRAN) injection 4 mg, 4 mg, Intravenous, Q6H PRN, Brannon Holder MD, 4 mg at 12/20/23 0354    Phosphorus Replacement - Follow Nurse / BPA Driven Protocol, , Does not apply, Serenity SAGE Patrick D, MD    polyethylene glycol (MIRALAX) powder 0.5 bottle, 0.5 bottle, Oral, Q12H, See Valdes MD    Potassium Replacement - Follow Nurse / BPA Driven Protocol, , Does not apply, Serenity SAGE Patrick D, MD    prochlorperazine (COMPAZINE) injection 5 mg, 5 mg, Intravenous, Q6H PRN, Manish Garcia MD, 5 mg at 12/20/23 1703    [COMPLETED] Insert Peripheral IV, , , Once **AND** sodium chloride 0.9 % flush 10 mL, 10 mL, Intravenous, PRN, Siva Flynn MD    sodium chloride 0.9 % flush 10 mL, 10 mL, Intravenous, Q12H, Brannon Holder MD, 10 mL at 12/21/23 0924    sodium chloride 0.9 % flush 10 mL, 10 mL, Intravenous, PRN, Brannon Holder MD    sodium chloride 0.9 %  infusion 40 mL, 40 mL, Intravenous, PRN, Brannon Holder MD  Review of Systems:    There is weakness and fatigue all other systems reviewed and negative    Objective     Vital Signs  Temp:  [97.6 °F (36.4 °C)-98 °F (36.7 °C)] 97.6 °F (36.4 °C)  Heart Rate:  [83-99] 83  Resp:  [18] 18  BP: (104-124)/(54-80) 121/59  Body mass index is 23.17 kg/m².    Intake/Output Summary (Last 24 hours) at 12/21/2023 1515  Last data filed at 12/21/2023 1008  Gross per 24 hour   Intake 340 ml   Output 200 ml   Net 140 ml     I/O this shift:  In: 340 [P.O.:340]  Out: 200 [Urine:200]     Physical Exam:   General: patient awake, alert and cooperative   Eyes: Normal lids and lashes, no scleral icterus   Neck: supple, normal ROM   Skin: warm and dry, not jaundiced   Cardiovascular: regular rhythm and rate, no murmurs auscultated   Pulm: clear to auscultation bilaterally, regular and unlabored   Abdomen: soft, nontender, nondistended; normal bowel sounds   Extremities: no rash or edema   Psychiatric: Normal mood and behavior; memory intact     Results Review:     I reviewed the patient's new clinical results.    Results from last 7 days   Lab Units 12/21/23  0854 12/20/23  2356 12/20/23  1820 12/20/23  0521 12/19/23  0853   WBC 10*3/mm3 19.20*  --   --  19.89* 17.16*   HEMOGLOBIN g/dL 12.2* 12.5* 12.8* 13.1 11.6*   HEMATOCRIT % 37.5 37.0* 37.6 37.6 33.8*   PLATELETS 10*3/mm3 270  --   --  301 317     Results from last 7 days   Lab Units 12/21/23  0854 12/20/23  1636 12/20/23  0521 12/19/23  0853 12/18/23  1814 12/18/23  0512 12/17/23  0513   SODIUM mmol/L 141  --  138 137  --  139 137   POTASSIUM mmol/L 3.5 3.7 3.6 3.8   < > 3.2* 3.1*   CHLORIDE mmol/L 108*  --  108* 108*  --  109* 105   CO2 mmol/L 21.6*  --  19.1* 20.0*  --  17.9* 18.0*   BUN mg/dL 10  --  7* 9  --  16 15   CREATININE mg/dL 0.99  --  1.02 1.03  --  1.04 1.09   CALCIUM mg/dL 7.6*  --  8.1* 8.0*  --  7.5* 8.3*   BILIRUBIN mg/dL  --   --   --   --   --  0.2 0.2   ALK PHOS  U/L  --   --   --   --   --  76 88   ALT (SGPT) U/L  --   --   --   --   --  9 11   AST (SGOT) U/L  --   --   --   --   --  11 6   GLUCOSE mg/dL 92  --  115* 102*  --  79 99    < > = values in this interval not displayed.         Lab Results   Lab Value Date/Time    LIPASE 16 12/13/2023 1008    LIPASE 11 12/11/2023 1316       Radiology:  CT Chest Without Contrast Diagnostic   Final Result       1.  A moderate fluid-filled hiatal hernia has decreased in size from   12/13/2023, however, there is new adjacent small volume fluid and fat   stranding, which raises concern for possible hernia incarceration and/or   superimposed inflammation. Wall thickening of the esophagus proximal to   the hiatal hernia is incompletely assessed and could reflect reflux   esophagitis. However, recommend GI evaluation and follow-up upper   endoscopy.   2.  Fluid-filled stomach and partially imaged fluid-filled small bowel   loops are nonspecific but could potentially reflect a gastroenteritis in   the appropriate clinical setting.   3.  A few scattered pulmonary nodules measuring up to 0.6 cm in the   right middle lobe are nonspecific. Recommend follow-up CT chest in 3 to   6 months.   4.  Emphysema.                   This report was finalized on 12/20/2023 11:17 AM by Dr. Laura Woodruff M.D on Workstation: [id number]              Assessment & Plan     Active Hospital Problems    Diagnosis     **Diarrhea     Gastroenteritis due to sapovirus     Severe malnutrition     Major depression, single episode     Anemia     Benign essential hypertension     Hyperlipidemia        Assessment:  Diarrhea with Sapovirus  Dehydration  Poor appetite  Weight loss  Leukocytosis        Plan:  Supportive care for Sapovirus  He is worried about stomach cancer, there would be no contraindication to performing EGD  I am happy to perform colonoscopy during the same session to look for other findings that could cause diarrhea and weight loss such as microscopic or  collagenous colitis, cancer etc.  EGD and colonoscopy tomorrow morning     I discussed the patients findings and my recommendations with patient and nursing staff.    See Valdes MD

## 2023-12-21 NOTE — PLAN OF CARE
Problem: Adult Inpatient Plan of Care  Goal: Optimal Comfort and Wellbeing  Outcome: Ongoing, Not Progressing  Intervention: Provide Person-Centered Care  Recent Flowsheet Documentation  Taken 12/21/2023 2509 by Bruna Erazo RN  Trust Relationship/Rapport:   care explained   choices provided  Goal: Readiness for Transition of Care  Outcome: Ongoing, Not Progressing   Goal Outcome Evaluation:  Plan of Care Reviewed With: patient           Outcome Evaluation: Patient has had several stools that are dark green to black. He starts the bowel prep at 1700 for colonscopy and EGD which will be tomorrow. The consent is signed. Nurse discussed the procedure with the patient. The patient has expressed concerns of dying and is concerned for his welfare.

## 2023-12-22 ENCOUNTER — ANESTHESIA (OUTPATIENT)
Dept: GASTROENTEROLOGY | Facility: HOSPITAL | Age: 83
End: 2023-12-22
Payer: MEDICARE

## 2023-12-22 ENCOUNTER — ANESTHESIA EVENT (OUTPATIENT)
Dept: GASTROENTEROLOGY | Facility: HOSPITAL | Age: 83
End: 2023-12-22
Payer: MEDICARE

## 2023-12-22 LAB
ALBUMIN SERPL-MCNC: 2.7 G/DL (ref 3.5–5.2)
ALBUMIN/GLOB SERPL: 1 G/DL
ALP SERPL-CCNC: 167 U/L (ref 39–117)
ALT SERPL W P-5'-P-CCNC: 36 U/L (ref 1–41)
ANION GAP SERPL CALCULATED.3IONS-SCNC: 10.5 MMOL/L (ref 5–15)
AST SERPL-CCNC: 66 U/L (ref 1–40)
BACTERIA SPEC AEROBE CULT: NORMAL
BACTERIA SPEC AEROBE CULT: NORMAL
BACTERIA SPEC CULT: NORMAL
BACTERIA SPEC CULT: NORMAL
BASOPHILS # BLD AUTO: 0.06 10*3/MM3 (ref 0–0.2)
BASOPHILS NFR BLD AUTO: 0.3 % (ref 0–1.5)
BILIRUB SERPL-MCNC: 1 MG/DL (ref 0–1.2)
BUN SERPL-MCNC: 8 MG/DL (ref 8–23)
BUN/CREAT SERPL: 8.5 (ref 7–25)
CALCIUM SPEC-SCNC: 7.9 MG/DL (ref 8.6–10.5)
CAMPYLOBACTER STL CULT: NORMAL
CHLORIDE SERPL-SCNC: 109 MMOL/L (ref 98–107)
CO2 SERPL-SCNC: 20.5 MMOL/L (ref 22–29)
CREAT SERPL-MCNC: 0.94 MG/DL (ref 0.76–1.27)
DEPRECATED RDW RBC AUTO: 44 FL (ref 37–54)
E COLI SXT STL QL IA: NEGATIVE
EGFRCR SERPLBLD CKD-EPI 2021: 79.4 ML/MIN/1.73
EOSINOPHIL # BLD AUTO: 4.3 10*3/MM3 (ref 0–0.4)
EOSINOPHIL NFR BLD AUTO: 22 % (ref 0.3–6.2)
ERYTHROCYTE [DISTWIDTH] IN BLOOD BY AUTOMATED COUNT: 13.4 % (ref 12.3–15.4)
GLOBULIN UR ELPH-MCNC: 2.6 GM/DL
GLUCOSE SERPL-MCNC: 102 MG/DL (ref 65–99)
HCT VFR BLD AUTO: 37 % (ref 37.5–51)
HCT VFR BLD AUTO: 40 % (ref 37.5–51)
HCT VFR BLD AUTO: 43.4 % (ref 37.5–51)
HGB BLD-MCNC: 12.6 G/DL (ref 13–17.7)
HGB BLD-MCNC: 13.3 G/DL (ref 13–17.7)
HGB BLD-MCNC: 14 G/DL (ref 13–17.7)
IMM GRANULOCYTES # BLD AUTO: 0.17 10*3/MM3 (ref 0–0.05)
IMM GRANULOCYTES NFR BLD AUTO: 0.9 % (ref 0–0.5)
INR PPP: 1.36 (ref 0.9–1.1)
LYMPHOCYTES # BLD AUTO: 3.71 10*3/MM3 (ref 0.7–3.1)
LYMPHOCYTES NFR BLD AUTO: 19 % (ref 19.6–45.3)
MAGNESIUM SERPL-MCNC: 1.9 MG/DL (ref 1.6–2.4)
MCH RBC QN AUTO: 30.7 PG (ref 26.6–33)
MCHC RBC AUTO-ENTMCNC: 34.1 G/DL (ref 31.5–35.7)
MCV RBC AUTO: 90 FL (ref 79–97)
MONOCYTES # BLD AUTO: 0.92 10*3/MM3 (ref 0.1–0.9)
MONOCYTES NFR BLD AUTO: 4.7 % (ref 5–12)
NEUTROPHILS NFR BLD AUTO: 10.35 10*3/MM3 (ref 1.7–7)
NEUTROPHILS NFR BLD AUTO: 53.1 % (ref 42.7–76)
NRBC BLD AUTO-RTO: 0.1 /100 WBC (ref 0–0.2)
PHOSPHATE SERPL-MCNC: 2.3 MG/DL (ref 2.5–4.5)
PLATELET # BLD AUTO: 293 10*3/MM3 (ref 140–450)
PMV BLD AUTO: 9.8 FL (ref 6–12)
POTASSIUM SERPL-SCNC: 4 MMOL/L (ref 3.5–5.2)
PROT SERPL-MCNC: 5.3 G/DL (ref 6–8.5)
PROTHROMBIN TIME: 17 SECONDS (ref 11.7–14.2)
RBC # BLD AUTO: 4.11 10*6/MM3 (ref 4.14–5.8)
SALM + SHIG STL CULT: NORMAL
SODIUM SERPL-SCNC: 140 MMOL/L (ref 136–145)
WBC NRBC COR # BLD AUTO: 19.51 10*3/MM3 (ref 3.4–10.8)

## 2023-12-22 PROCEDURE — 85014 HEMATOCRIT: CPT | Performed by: INTERNAL MEDICINE

## 2023-12-22 PROCEDURE — 99223 1ST HOSP IP/OBS HIGH 75: CPT | Performed by: STUDENT IN AN ORGANIZED HEALTH CARE EDUCATION/TRAINING PROGRAM

## 2023-12-22 PROCEDURE — 0DB68ZX EXCISION OF STOMACH, VIA NATURAL OR ARTIFICIAL OPENING ENDOSCOPIC, DIAGNOSTIC: ICD-10-PCS | Performed by: INTERNAL MEDICINE

## 2023-12-22 PROCEDURE — 45378 DIAGNOSTIC COLONOSCOPY: CPT | Performed by: INTERNAL MEDICINE

## 2023-12-22 PROCEDURE — 85025 COMPLETE CBC W/AUTO DIFF WBC: CPT | Performed by: INTERNAL MEDICINE

## 2023-12-22 PROCEDURE — 80053 COMPREHEN METABOLIC PANEL: CPT | Performed by: INTERNAL MEDICINE

## 2023-12-22 PROCEDURE — 85610 PROTHROMBIN TIME: CPT | Performed by: INTERNAL MEDICINE

## 2023-12-22 PROCEDURE — 0DB98ZX EXCISION OF DUODENUM, VIA NATURAL OR ARTIFICIAL OPENING ENDOSCOPIC, DIAGNOSTIC: ICD-10-PCS | Performed by: INTERNAL MEDICINE

## 2023-12-22 PROCEDURE — 83735 ASSAY OF MAGNESIUM: CPT | Performed by: HOSPITALIST

## 2023-12-22 PROCEDURE — 25010000002 ONDANSETRON PER 1 MG: Performed by: INTERNAL MEDICINE

## 2023-12-22 PROCEDURE — 84100 ASSAY OF PHOSPHORUS: CPT | Performed by: HOSPITALIST

## 2023-12-22 PROCEDURE — 43239 EGD BIOPSY SINGLE/MULTIPLE: CPT | Performed by: INTERNAL MEDICINE

## 2023-12-22 PROCEDURE — 25010000002 PROPOFOL 10 MG/ML EMULSION: Performed by: ANESTHESIOLOGY

## 2023-12-22 PROCEDURE — 88305 TISSUE EXAM BY PATHOLOGIST: CPT | Performed by: INTERNAL MEDICINE

## 2023-12-22 PROCEDURE — 85018 HEMOGLOBIN: CPT | Performed by: INTERNAL MEDICINE

## 2023-12-22 PROCEDURE — 25810000003 LACTATED RINGERS PER 1000 ML: Performed by: HOSPITALIST

## 2023-12-22 RX ORDER — LIDOCAINE HYDROCHLORIDE 20 MG/ML
INJECTION, SOLUTION INFILTRATION; PERINEURAL AS NEEDED
Status: DISCONTINUED | OUTPATIENT
Start: 2023-12-22 | End: 2023-12-22 | Stop reason: SURG

## 2023-12-22 RX ORDER — POLYETHYLENE GLYCOL 3350 17 G/17G
0.5 POWDER, FOR SOLUTION ORAL EVERY 12 HOURS
Status: COMPLETED | OUTPATIENT
Start: 2023-12-22 | End: 2023-12-23

## 2023-12-22 RX ORDER — PROPOFOL 10 MG/ML
VIAL (ML) INTRAVENOUS AS NEEDED
Status: DISCONTINUED | OUTPATIENT
Start: 2023-12-22 | End: 2023-12-22 | Stop reason: SURG

## 2023-12-22 RX ORDER — GLYCOPYRROLATE 0.2 MG/ML
INJECTION INTRAMUSCULAR; INTRAVENOUS AS NEEDED
Status: DISCONTINUED | OUTPATIENT
Start: 2023-12-22 | End: 2023-12-22 | Stop reason: SURG

## 2023-12-22 RX ORDER — SODIUM CHLORIDE 9 MG/ML
30 INJECTION, SOLUTION INTRAVENOUS CONTINUOUS PRN
Status: DISCONTINUED | OUTPATIENT
Start: 2023-12-22 | End: 2023-12-25 | Stop reason: HOSPADM

## 2023-12-22 RX ORDER — POLYETHYLENE GLYCOL 3350 17 G/17G
0.5 POWDER, FOR SOLUTION ORAL EVERY 12 HOURS
Status: DISPENSED | OUTPATIENT
Start: 2023-12-22 | End: 2023-12-23

## 2023-12-22 RX ADMIN — LIDOCAINE HYDROCHLORIDE 60 MG: 20 INJECTION, SOLUTION INFILTRATION; PERINEURAL at 11:06

## 2023-12-22 RX ADMIN — GLYCOPYRROLATE 0.1 MG: 0.2 INJECTION INTRAMUSCULAR; INTRAVENOUS at 11:03

## 2023-12-22 RX ADMIN — MIRTAZAPINE 30 MG: 30 TABLET, FILM COATED ORAL at 21:03

## 2023-12-22 RX ADMIN — Medication 10 ML: at 21:05

## 2023-12-22 RX ADMIN — POLYETHYLENE GLYCOL 3350 0.5 BOTTLE: 17 POWDER, FOR SOLUTION ORAL at 17:11

## 2023-12-22 RX ADMIN — ONDANSETRON 4 MG: 2 INJECTION INTRAMUSCULAR; INTRAVENOUS at 17:06

## 2023-12-22 RX ADMIN — POLYETHYLENE GLYCOL 3350 0.5 BOTTLE: 17 POWDER, FOR SOLUTION ORAL at 04:40

## 2023-12-22 RX ADMIN — Medication 10 ML: at 09:05

## 2023-12-22 RX ADMIN — PROPOFOL 100 MG: 10 INJECTION, EMULSION INTRAVENOUS at 11:06

## 2023-12-22 RX ADMIN — SODIUM CHLORIDE, POTASSIUM CHLORIDE, SODIUM LACTATE AND CALCIUM CHLORIDE 75 ML/HR: 600; 310; 30; 20 INJECTION, SOLUTION INTRAVENOUS at 12:56

## 2023-12-22 RX ADMIN — PROPOFOL 180 MCG/KG/MIN: 10 INJECTION, EMULSION INTRAVENOUS at 11:06

## 2023-12-22 NOTE — CASE MANAGEMENT/SOCIAL WORK
Continued Stay Note  [facility]     Patient Name: Jewel Best  MRN: [id number]  Today's Date: 12/22/2023    Admit Date: 12/17/2023    Plan: Home with [facility] (accepted)   Discharge Plan       Row Name 12/22/23 0849       Plan    Plan Home with [facility] (accepted)    Patient/Family in Agreement with Plan yes    Plan Comments [facility] accepted. Patients plan remains home with HH. CCP to follow.                   Discharge Codes    No documentation.

## 2023-12-22 NOTE — PLAN OF CARE
Goal Outcome Evaluation:  Plan of Care Reviewed With: patient        Progress: improving  Outcome Evaluation: Patient continues with bowel prep. Stool is watery and light brown in color.

## 2023-12-22 NOTE — ANESTHESIA PREPROCEDURE EVALUATION
Anesthesia Evaluation     Patient summary reviewed and Nursing notes reviewed   NPO Solid Status: > 8 hours  NPO Liquid Status: > 2 hours           Airway   Mallampati: II  TM distance: >3 FB  Neck ROM: full  Dental - normal exam     Pulmonary - negative pulmonary ROS and normal exam    breath sounds clear to auscultation  Cardiovascular - normal exam    Rhythm: regular  Rate: normal    (+) hypertension, hyperlipidemia  (-) angina, orthopnea, PND, FERNANDO      Neuro/Psych  (+) psychiatric history Anxiety and Depression  GI/Hepatic/Renal/Endo    (+) renal disease-    Musculoskeletal (-) negative ROS    Abdominal    Substance History - negative use     OB/GYN negative ob/gyn ROS         Other      history of cancer                      Anesthesia Plan    ASA 3     MAC   total IV anesthesia    Anesthetic plan, risks, benefits, and alternatives have been provided, discussed and informed consent has been obtained with: patient.        CODE STATUS:    Code Status (Patient has no pulse and is not breathing): CPR (Attempt to Resuscitate)  Medical Interventions (Patient has pulse or is breathing): Full Support

## 2023-12-22 NOTE — ANESTHESIA POSTPROCEDURE EVALUATION
Patient: Jewel Best    Procedure Summary       Date: 12/22/23 [facility]    Anesthesia Start: 1059 Anesthesia Stop: 1127    Procedures:       ESOPHAGOGASTRODUODENOSCOPY WITH COLD BIOPSIES (Esophagus)      COLONOSCOPY TO TRANSVERSE Diagnosis:       Gastroenteritis due to sapovirus      (Gastroenteritis due to sapovirus [A08.31])    Surgeons: See Valdes MD Provider: Lico Holland MD    Anesthesia Type: MAC ASA Status: 3            Anesthesia Type: MAC    Vitals  No vitals data found for the desired time range.          Post Anesthesia Care and Evaluation    Patient location during evaluation: PHASE II  Patient participation: waiting for patient participation  Level of consciousness: sleepy but conscious  Pain management: adequate    Airway patency: patent    Cardiovascular status: acceptable  Respiratory status: acceptable  Hydration status: acceptable

## 2023-12-22 NOTE — PROGRESS NOTES
Name: Jewel Best ADMIT: 2023   : 1940  PCP: Ernie Panda MD    MRN: [id number] LOS: 4 days   AGE/SEX: 83 y.o. male  ROOM: [facility]     Subjective   Subjective   No nausea or vomiting. Colon prep in progress.      Objective   Objective   Vital Signs  Temp:  [97.6 °F (36.4 °C)-98.4 °F (36.9 °C)] 98.4 °F (36.9 °C)  Heart Rate:  [] 104  Resp:  [18] 18  BP: (121-135)/(48-69) 135/48  SpO2:  [94 %-99 %] 94 %  on   ;   Device (Oxygen Therapy): room air  Body mass index is 23.66 kg/m².    Physical Exam  Vitals and nursing note reviewed.   Constitutional:       General: He is not in acute distress.     Appearance: He is ill-appearing (chronically). He is not diaphoretic.   Eyes:      General: No scleral icterus.  Cardiovascular:      Rate and Rhythm: Normal rate and regular rhythm.      Pulses: Normal pulses.   Pulmonary:      Effort: Pulmonary effort is normal.      Breath sounds: No wheezing or rhonchi.   Abdominal:      General: There is no distension.      Palpations: Abdomen is soft.      Tenderness: There is no abdominal tenderness. There is no guarding or rebound.   Musculoskeletal:         General: No swelling or tenderness.   Skin:     General: Skin is warm and dry.   Neurological:      Mental Status: He is alert.      Cranial Nerves: No cranial nerve deficit.   Psychiatric:         Mood and Affect: Mood is not depressed.         Behavior: Behavior normal.       Results Review  I reviewed the patient's new clinical results.    Results from last 7 days   Lab Units 23  0457 23  2352 23  1544 23  0854 23  1820 23  0521 23  0853   WBC 10*3/mm3 19.51*  --   --  19.20*  --  19.89* 17.16*   HEMOGLOBIN g/dL 12.6* 13.3 12.4* 12.2*   < > 13.1 11.6*   PLATELETS 10*3/mm3 293  --   --  270  --  301 317    < > = values in this interval not displayed.     Results from last 7 days   Lab Units 23  0457 23  1826 23  0854 23  1634  "12/20/23  0521 12/19/23  0853   SODIUM mmol/L 140  --  141  --  138 137   POTASSIUM mmol/L 4.0 4.3 3.5 3.7 3.6 3.8   CHLORIDE mmol/L 109*  --  108*  --  108* 108*   CO2 mmol/L 20.5*  --  21.6*  --  19.1* 20.0*   BUN mg/dL 8  --  10  --  7* 9   CREATININE mg/dL 0.94  --  0.99  --  1.02 1.03   GLUCOSE mg/dL 102*  --  92  --  115* 102*   EGFR mL/min/1.73 79.4  --  74.7  --  72.0 71.2     Results from last 7 days   Lab Units 12/22/23  0457 12/19/23  0853 12/18/23  0512 12/17/23  0513   ALBUMIN g/dL 2.7* 2.8* 2.8* 3.7   BILIRUBIN mg/dL 1.0  --  0.2 0.2   ALK PHOS U/L 167*  --  76 88   AST (SGOT) U/L 66*  --  11 6   ALT (SGPT) U/L 36  --  9 11     Results from last 7 days   Lab Units 12/22/23  0457 12/21/23  1826 12/21/23  0854 12/20/23  1636 12/20/23  0521 12/19/23  2053 12/19/23  0853 12/18/23  1814 12/18/23  0512 12/17/23  0513   CALCIUM mg/dL 7.9*  --  7.6*  --  8.1*  --  8.0*  --  7.5* 8.3*   ALBUMIN g/dL 2.7*  --   --   --   --   --  2.8*  --  2.8* 3.7   MAGNESIUM mg/dL 1.9  --  2.0  --  2.8*  --  1.5*  --  1.7 1.7   PHOSPHORUS mg/dL 2.3* 2.3* 2.1* 2.6 2.2*   < > 1.9*   < > 2.2*  --     < > = values in this interval not displayed.     Results from last 7 days   Lab Units 12/21/23  0854 12/17/23  0513   PROCALCITONIN ng/mL 0.13 0.13   LACTATE mmol/L  --  1.3     No results found for: \"HGBA1C\", \"POCGLU\"    CT Chest Without Contrast Diagnostic    Result Date: 12/20/2023   1.  A moderate fluid-filled hiatal hernia has decreased in size from 12/13/2023, however, there is new adjacent small volume fluid and fat stranding, which raises concern for possible hernia incarceration and/or superimposed inflammation. Wall thickening of the esophagus proximal to the hiatal hernia is incompletely assessed and could reflect reflux esophagitis. However, recommend GI evaluation and follow-up upper endoscopy. 2.  Fluid-filled stomach and partially imaged fluid-filled small bowel loops are nonspecific but could potentially reflect a " gastroenteritis in the appropriate clinical setting. 3.  A few scattered pulmonary nodules measuring up to 0.6 cm in the right middle lobe are nonspecific. Recommend follow-up CT chest in 3 to 6 months. 4.  Emphysema.     This report was finalized on 12/20/2023 11:17 AM by Dr. Laura Woodruff M.D on Workstation: [id number]       Scheduled Meds  cholestyramine, 1 packet, Oral, Q12H  mirtazapine, 30 mg, Oral, Nightly  sodium chloride, 10 mL, Intravenous, Q12H    Continuous Infusions  lactated ringers, 75 mL/hr, Last Rate: 75 mL/hr (12/21/23 0747)    PRN Meds    Calcium Replacement - Follow Nurse / BPA Driven Protocol    influenza vaccine    Magnesium Standard Dose Replacement - Follow Nurse / BPA Driven Protocol    melatonin    ondansetron    Phosphorus Replacement - Follow Nurse / BPA Driven Protocol    Potassium Replacement - Follow Nurse / BPA Driven Protocol    prochlorperazine    [COMPLETED] Insert Peripheral IV **AND** sodium chloride    sodium chloride    sodium chloride    NPO Diet NPO Type: Sips with Meds    I have personally reviewed:  [x]  Medications  [x]  Laboratory   []  Microbiology   []  Radiology   [x]  EKG/Telemetry sinus on telemetry  []  Cardiology/Vascular   []  Pathology   []  Records        Assessment/Plan     Active Hospital Problems    Diagnosis  POA    **Diarrhea [R19.7]  Yes    Gastroenteritis due to sapovirus [A08.31]  Unknown    Severe malnutrition [E43]  Yes    Major depression, single episode [F32.9]  Yes    Anemia [D64.9]  Yes    Benign essential hypertension [I10]  Yes    Hyperlipidemia [E78.5]  Yes      Resolved Hospital Problems   No resolved problems to display.       83 y.o. male admitted with Diarrhea.    Diarrhea  Nausea and vomiting  Sapovirus positive  Overall he has improved. He is undergoing bowel prep  CT scan 12/20/2023 showing fluid and fat stranding around moderate hiatal hernia, esophageal wall thickening proximal to hiatal hernia  EGD and colonoscopy today per GI. Stop IVF  after scopes    Persistent leukocytosis  Continue to monitor suspect is reactive due to above  Remains afebrile. Procalcitonin and lactate were not elevated. Repeat procalcitonin yesterday not elevated  No other infectious complaints  Blood cultures negative at 5 days. C. difficile negative.  Continue to monitor off antibiotics. Consult ID    Weight loss (20 lbs over a year)  Anorexia  Malnutrition  Weight loss and anorexia seems chronic problem   Correcting electrolytes  EGD and colonoscopy today    Depression  History of waxing and waning confusion August admission- underlying dementia suspected  Appreciate psychiatry-Remeron increased    Right middle lobe nodule seen on abdomen pelvis CT  CT of the chest showed emphysema and a few scattered pulmonary nodules up to 0.6 cm in the right middle lobe nonspecific. Follow-up CT chest 3 to 6 months.    Hypertension  BP is fine    Discharge  Probably home with home health, timing TBD  Expected discharge date/ time has not been documented.     Discussed with patient and nursing staff     Manish Garcia MD  [facility]  12/22/23

## 2023-12-22 NOTE — CONSULTS
Referring Provider: Brannon Holder MD  Reason for Consultation:     persistent WBC elevation     Chief Complaint   Patient presents with    Diarrhea         Subjective   History of present illness: Patient is an 83-year-old male with past medical history of lymphoma and bladder cancer who presents with diarrhea.  ID consulted for persistent WBC elevation.    On admission patient complaining of diarrhea and has remained afebrile with WBC stable at 19.  Procalcitonin and lactate have been negative.  Imaging of the chest and abdomen have been unrevealing.  Blood cultures and respiratory panel has been negative.  C. difficile testing has been negative.  GI pathogen panel positive for Sapovirus.      Patient seen in endoscopy status post his procedure.  Colonoscopy prep was poor and this was aborted.  Performed EGD with active gastritis.  Currently patient reports no acute complaints other than depression.  Denies any fevers or chills.  Denies any joint pains.  Denies any animal exposures or travel.  Denies any abdominal pain or nausea.  States he has had ongoing diarrhea for over a week.    Past Medical History:   Diagnosis Date    Anemia     Anxiety     Bladder cancer     Depression     Eating disorder     Hyperlipidemia     Hypertension     Lymphoma        Past Surgical History:   Procedure Laterality Date    COLONOSCOPY      HIP SURGERY Right     KNEE SURGERY      bakers cyst removal       Family history is unknown by patient.     reports that he quit smoking about 52 years ago. His smoking use included cigarettes. He has a 10.00 pack-year smoking history. He has been exposed to tobacco smoke. He has never used smokeless tobacco. He reports that he does not drink alcohol and does not use drugs.     No Known Allergies    Medication:  Antibiotics:  Anti-Infectives (From admission, onward)      None              Objective     Physical Exam:   Vital Signs   Temp:  [97.6 °F (36.4 °C)-98.4 °F (36.9 °C)] 98.4 °F (36.9  "°C)  Heart Rate:  [] 93  Resp:  [16-18] 16  BP: (106-135)/(48-69) 106/65    GENERAL: Awake and alert, in no acute distress.   HEENT: Oropharynx is clear. Hearing is grossly normal.   EYES: PERRL. No conjunctival injection. No lid lag.   LUNGS: Normal work of breathing  GI: Soft, nontender, nondistended.   SKIN: Warm and dry without cutaneous eruptions   PSYCHIATRIC: Appropriate mood    Results Review:   I reviewed the patient's new clinical results.  I reviewed the patient's new imaging results and agree with the interpretation.  I reviewed the patient's other test results and agree with the interpretation    Lab Results   Component Value Date    WBC 19.51 (H) 12/22/2023    HGB 12.6 (L) 12/22/2023    HCT 37.0 (L) 12/22/2023    MCV 90.0 12/22/2023     12/22/2023       No results found for: \"VANCOPEAK\", \"VANCOTROUGH\", \"VANCORANDOM\"    Lab Results   Component Value Date    GLUCOSE 102 (H) 12/22/2023    BUN 8 12/22/2023    CREATININE 0.94 12/22/2023    EGFRIFNONA 54 (L) 11/24/2021    EGFRIFAFRI >60 02/06/2023    BCR 8.5 12/22/2023    CO2 20.5 (L) 12/22/2023    CALCIUM 7.9 (L) 12/22/2023    PROTENTOTREF 7.1 10/18/2023    ALBUMIN 2.7 (L) 12/22/2023    LABIL2 2.0 10/18/2023    AST 66 (H) 12/22/2023    ALT 36 12/22/2023         Estimated Creatinine Clearance: 60.8 mL/min (by C-G formula based on SCr of 0.94 mg/dL).      Microbiology:  12/13 C. difficile testing negative  12/13 GI pathogen panel negative  12/17 blood cultures negative  12/17 stool culture negative  12/17 GI pathogen panel positive for Sapovirus  12/17 C. difficile negative    Radiology:  12/20 CT chest report reviewed with fluid-filled hiatal hernia with small volume fluid and fat stranding.  Wall thickening of the esophagus.  Fluid-filled stomach and partially imaged fluid-filled small bowel loops are nonspecific and could reflect gastroenteritis.  Scattered pulmonary nodules and emphysema.    Assessment     #Leukocytosis  #Sapovirus " infection  #Diarrhea   #Gastritis  #Hiatal hernia    Blood cultures have been negative to date.  CT chest and abdomen without any obvious sources of infection.  He has remained afebrile and leukocytosis has remained stable throughout his hospitalization.  May be reactive to his gastritis/enteritis secondary to Sapovirus. No indication for antibiotics at this time.  Will add on strongyloidiasis antibodies and stool ova and parasites for completeness.  Pathology from EGD is pending.  ID will follow-up these results peripherally.

## 2023-12-22 NOTE — NURSING NOTE
Pt started Miralax bowel prep at 1700 today in [facility].His previous bowel prep for Colonoscopy today was insufficient and they were unable to do lower scope today. I was told in report this morning that his stool was yellow with flecks and he did not have any bowel movements prior to him leaving for the colonoscopy this am. I explained the importance of drinking all of the bowel prep and also doing an enema if needed in the morning. Pt has been argumentative at times today. He is almost finished with the 1/2 bottle of Miralax ordered for 1700.

## 2023-12-23 ENCOUNTER — ANESTHESIA EVENT (OUTPATIENT)
Dept: GASTROENTEROLOGY | Facility: HOSPITAL | Age: 83
End: 2023-12-23
Payer: MEDICARE

## 2023-12-23 ENCOUNTER — ANESTHESIA (OUTPATIENT)
Dept: GASTROENTEROLOGY | Facility: HOSPITAL | Age: 83
End: 2023-12-23
Payer: MEDICARE

## 2023-12-23 LAB
ALBUMIN SERPL-MCNC: 3.1 G/DL (ref 3.5–5.2)
ALBUMIN/GLOB SERPL: 1.1 G/DL
ALP SERPL-CCNC: 212 U/L (ref 39–117)
ALT SERPL W P-5'-P-CCNC: 53 U/L (ref 1–41)
ANION GAP SERPL CALCULATED.3IONS-SCNC: 12.2 MMOL/L (ref 5–15)
AST SERPL-CCNC: 45 U/L (ref 1–40)
BASOPHILS # BLD MANUAL: 0.22 10*3/MM3 (ref 0–0.2)
BASOPHILS NFR BLD MANUAL: 1.1 % (ref 0–1.5)
BILIRUB SERPL-MCNC: 0.7 MG/DL (ref 0–1.2)
BUN SERPL-MCNC: 9 MG/DL (ref 8–23)
BUN/CREAT SERPL: 9.8 (ref 7–25)
CALCIUM SPEC-SCNC: 8.2 MG/DL (ref 8.6–10.5)
CHLORIDE SERPL-SCNC: 106 MMOL/L (ref 98–107)
CO2 SERPL-SCNC: 21.8 MMOL/L (ref 22–29)
CREAT SERPL-MCNC: 0.92 MG/DL (ref 0.76–1.27)
DEPRECATED RDW RBC AUTO: 44.2 FL (ref 37–54)
EGFRCR SERPLBLD CKD-EPI 2021: 81.5 ML/MIN/1.73
EOSINOPHIL # BLD MANUAL: 7.73 10*3/MM3 (ref 0–0.4)
EOSINOPHIL NFR BLD MANUAL: 39.4 % (ref 0.3–6.2)
ERYTHROCYTE [DISTWIDTH] IN BLOOD BY AUTOMATED COUNT: 13.7 % (ref 12.3–15.4)
GLOBULIN UR ELPH-MCNC: 2.8 GM/DL
GLUCOSE SERPL-MCNC: 113 MG/DL (ref 65–99)
HCT VFR BLD AUTO: 37 % (ref 37.5–51)
HCT VFR BLD AUTO: 39.8 % (ref 37.5–51)
HGB BLD-MCNC: 12.6 G/DL (ref 13–17.7)
HGB BLD-MCNC: 13.3 G/DL (ref 13–17.7)
INR PPP: 1.26 (ref 0.9–1.1)
LYMPHOCYTES # BLD MANUAL: 1.45 10*3/MM3 (ref 0.7–3.1)
LYMPHOCYTES NFR BLD MANUAL: 1.1 % (ref 5–12)
MAGNESIUM SERPL-MCNC: 1.8 MG/DL (ref 1.6–2.4)
MCH RBC QN AUTO: 30.1 PG (ref 26.6–33)
MCHC RBC AUTO-ENTMCNC: 34.1 G/DL (ref 31.5–35.7)
MCV RBC AUTO: 88.5 FL (ref 79–97)
MONOCYTES # BLD: 0.22 10*3/MM3 (ref 0.1–0.9)
NEUTROPHILS # BLD AUTO: 10.02 10*3/MM3 (ref 1.7–7)
NEUTROPHILS NFR BLD MANUAL: 51.1 % (ref 42.7–76)
PHOSPHATE SERPL-MCNC: 2.6 MG/DL (ref 2.5–4.5)
PLAT MORPH BLD: NORMAL
PLATELET # BLD AUTO: 305 10*3/MM3 (ref 140–450)
PMV BLD AUTO: 9.8 FL (ref 6–12)
POTASSIUM SERPL-SCNC: 3.8 MMOL/L (ref 3.5–5.2)
PROT SERPL-MCNC: 5.9 G/DL (ref 6–8.5)
PROTHROMBIN TIME: 16 SECONDS (ref 11.7–14.2)
RBC # BLD AUTO: 4.18 10*6/MM3 (ref 4.14–5.8)
RBC MORPH BLD: NORMAL
SODIUM SERPL-SCNC: 140 MMOL/L (ref 136–145)
VARIANT LYMPHS NFR BLD MANUAL: 7.4 % (ref 19.6–45.3)
WBC MORPH BLD: NORMAL
WBC NRBC COR # BLD AUTO: 19.61 10*3/MM3 (ref 3.4–10.8)

## 2023-12-23 PROCEDURE — 85025 COMPLETE CBC W/AUTO DIFF WBC: CPT | Performed by: INTERNAL MEDICINE

## 2023-12-23 PROCEDURE — 85610 PROTHROMBIN TIME: CPT | Performed by: INTERNAL MEDICINE

## 2023-12-23 PROCEDURE — 85014 HEMATOCRIT: CPT | Performed by: INTERNAL MEDICINE

## 2023-12-23 PROCEDURE — 25810000003 SODIUM CHLORIDE 0.9 % SOLUTION: Performed by: INTERNAL MEDICINE

## 2023-12-23 PROCEDURE — 85007 BL SMEAR W/DIFF WBC COUNT: CPT | Performed by: INTERNAL MEDICINE

## 2023-12-23 PROCEDURE — 83735 ASSAY OF MAGNESIUM: CPT | Performed by: INTERNAL MEDICINE

## 2023-12-23 PROCEDURE — 25010000002 PROPOFOL 10 MG/ML EMULSION: Performed by: ANESTHESIOLOGY

## 2023-12-23 PROCEDURE — 85018 HEMOGLOBIN: CPT | Performed by: INTERNAL MEDICINE

## 2023-12-23 PROCEDURE — 84100 ASSAY OF PHOSPHORUS: CPT | Performed by: INTERNAL MEDICINE

## 2023-12-23 PROCEDURE — 86682 HELMINTH ANTIBODY: CPT | Performed by: INTERNAL MEDICINE

## 2023-12-23 PROCEDURE — 80053 COMPREHEN METABOLIC PANEL: CPT | Performed by: INTERNAL MEDICINE

## 2023-12-23 PROCEDURE — 0DBH8ZX EXCISION OF CECUM, VIA NATURAL OR ARTIFICIAL OPENING ENDOSCOPIC, DIAGNOSTIC: ICD-10-PCS | Performed by: INTERNAL MEDICINE

## 2023-12-23 PROCEDURE — 25010000002 ONDANSETRON PER 1 MG: Performed by: INTERNAL MEDICINE

## 2023-12-23 PROCEDURE — 25010000002 PHENYLEPHRINE 10 MG/ML SOLUTION: Performed by: ANESTHESIOLOGY

## 2023-12-23 PROCEDURE — 88305 TISSUE EXAM BY PATHOLOGIST: CPT | Performed by: INTERNAL MEDICINE

## 2023-12-23 PROCEDURE — 45380 COLONOSCOPY AND BIOPSY: CPT | Performed by: INTERNAL MEDICINE

## 2023-12-23 RX ORDER — PROPOFOL 10 MG/ML
VIAL (ML) INTRAVENOUS AS NEEDED
Status: DISCONTINUED | OUTPATIENT
Start: 2023-12-23 | End: 2023-12-23 | Stop reason: SURG

## 2023-12-23 RX ORDER — SODIUM CHLORIDE 9 MG/ML
1000 INJECTION, SOLUTION INTRAVENOUS CONTINUOUS
Status: DISCONTINUED | OUTPATIENT
Start: 2023-12-23 | End: 2023-12-25 | Stop reason: HOSPADM

## 2023-12-23 RX ORDER — PHENYLEPHRINE HYDROCHLORIDE 10 MG/ML
INJECTION INTRAVENOUS AS NEEDED
Status: DISCONTINUED | OUTPATIENT
Start: 2023-12-23 | End: 2023-12-23 | Stop reason: SURG

## 2023-12-23 RX ORDER — LIDOCAINE HYDROCHLORIDE 20 MG/ML
INJECTION, SOLUTION INFILTRATION; PERINEURAL AS NEEDED
Status: DISCONTINUED | OUTPATIENT
Start: 2023-12-23 | End: 2023-12-23 | Stop reason: SURG

## 2023-12-23 RX ADMIN — PROPOFOL 100 MG: 10 INJECTION, EMULSION INTRAVENOUS at 10:48

## 2023-12-23 RX ADMIN — Medication 10 ML: at 20:38

## 2023-12-23 RX ADMIN — PHENYLEPHRINE HYDROCHLORIDE 200 MCG: 10 INJECTION INTRAVENOUS at 10:52

## 2023-12-23 RX ADMIN — POLYETHYLENE GLYCOL 3350 0.5 BOTTLE: 17 POWDER, FOR SOLUTION ORAL at 04:42

## 2023-12-23 RX ADMIN — Medication 10 ML: at 08:23

## 2023-12-23 RX ADMIN — CHOLESTYRAMINE 1 PACKET: 4 POWDER, FOR SUSPENSION ORAL at 20:36

## 2023-12-23 RX ADMIN — LIDOCAINE HYDROCHLORIDE 100 MG: 20 INJECTION, SOLUTION INFILTRATION; PERINEURAL at 10:47

## 2023-12-23 RX ADMIN — PROPOFOL 100 MCG/KG/MIN: 10 INJECTION, EMULSION INTRAVENOUS at 10:49

## 2023-12-23 RX ADMIN — POLYETHYLENE GLYCOL 3350 0.5 BOTTLE: 17 POWDER, FOR SOLUTION ORAL at 04:43

## 2023-12-23 RX ADMIN — ONDANSETRON 4 MG: 2 INJECTION INTRAMUSCULAR; INTRAVENOUS at 03:35

## 2023-12-23 RX ADMIN — SODIUM CHLORIDE 1000 ML: 9 INJECTION, SOLUTION INTRAVENOUS at 10:12

## 2023-12-23 NOTE — PLAN OF CARE
Problem: Adult Inpatient Plan of Care  Goal: Plan of Care Review  Outcome: Ongoing, Progressing  Goal: Patient-Specific Goal (Individualized)  Outcome: Ongoing, Progressing  Goal: Absence of Hospital-Acquired Illness or Injury  Outcome: Ongoing, Progressing  Intervention: Identify and Manage Fall Risk  Recent Flowsheet Documentation  Taken 12/23/2023 0736 by Krupa Barbour RN  Safety Promotion/Fall Prevention:   safety round/check completed   fall prevention program maintained  Goal: Optimal Comfort and Wellbeing  Outcome: Ongoing, Progressing  Goal: Readiness for Transition of Care  Outcome: Ongoing, Progressing     Problem: Electrolyte Imbalance  Goal: Electrolyte Balance  Outcome: Ongoing, Progressing     Problem: Fall Injury Risk  Goal: Absence of Fall and Fall-Related Injury  Outcome: Ongoing, Progressing  Intervention: Promote Injury-Free Environment  Recent Flowsheet Documentation  Taken 12/23/2023 0736 by Krupa Barbour RN  Safety Promotion/Fall Prevention:   safety round/check completed   fall prevention program maintained     Problem: Malnutrition  Goal: Improved Nutritional Intake  Outcome: Ongoing, Progressing     Problem: Oral Intake Inadequate  Goal: Improved Oral Intake  Outcome: Ongoing, Progressing     Problem: Skin Injury Risk Increased  Goal: Skin Health and Integrity  Outcome: Ongoing, Progressing   Goal Outcome Evaluation:

## 2023-12-23 NOTE — PROGRESS NOTES
Name: Jewel Best ADMIT: 2023   : 1940  PCP: Ernie Panda MD    MRN: [id number] LOS: 5 days   AGE/SEX: 83 y.o. male  ROOM: ENDO/ENDO     Subjective   Subjective   No nausea or vomiting.      Objective   Objective   Vital Signs  Temp:  [97.3 °F (36.3 °C)-98.4 °F (36.9 °C)] 98.3 °F (36.8 °C)  Heart Rate:  [] 97  Resp:  [11-22] 16  BP: (110-163)/() 110/62  SpO2:  [94 %-98 %] 94 %  on  Flow (L/min):  [10] 10;   Device (Oxygen Therapy): room air  Body mass index is 23.06 kg/m².    Physical Exam  Vitals and nursing note reviewed.   Constitutional:       General: He is not in acute distress.     Appearance: He is ill-appearing (chronically). He is not diaphoretic.   Eyes:      General: No scleral icterus.  Cardiovascular:      Rate and Rhythm: Normal rate and regular rhythm.      Pulses: Normal pulses.   Pulmonary:      Effort: Pulmonary effort is normal.      Breath sounds: No wheezing or rhonchi.   Abdominal:      General: There is no distension.      Palpations: Abdomen is soft.      Tenderness: There is no abdominal tenderness. There is no guarding or rebound.   Musculoskeletal:         General: No swelling or tenderness.   Skin:     General: Skin is warm and dry.   Neurological:      Mental Status: He is alert.      Cranial Nerves: No cranial nerve deficit.   Psychiatric:         Mood and Affect: Mood is not depressed.         Behavior: Behavior normal.       Results Review  I reviewed the patient's new clinical results.    Results from last 7 days   Lab Units 23  0047 23  1840 23  0457 23  2352 23  1544 23  0854 23  1820 23  0521   WBC 10*3/mm3 19.61*  --  19.51*  --   --  19.20*  --  19.89*   HEMOGLOBIN g/dL 12.6* 14.0 12.6* 13.3   < > 12.2*   < > 13.1   PLATELETS 10*3/mm3 305  --  293  --   --  270  --  301    < > = values in this interval not displayed.     Results from last 7 days   Lab Units 23  0042  "12/22/23  0457 12/21/23  1826 12/21/23  0854 12/20/23  1636 12/20/23  0521   SODIUM mmol/L 140 140  --  141  --  138   POTASSIUM mmol/L 3.8 4.0 4.3 3.5   < > 3.6   CHLORIDE mmol/L 106 109*  --  108*  --  108*   CO2 mmol/L 21.8* 20.5*  --  21.6*  --  19.1*   BUN mg/dL 9 8  --  10  --  7*   CREATININE mg/dL 0.92 0.94  --  0.99  --  1.02   GLUCOSE mg/dL 113* 102*  --  92  --  115*   EGFR mL/min/1.73 81.5 79.4  --  74.7  --  72.0    < > = values in this interval not displayed.     Results from last 7 days   Lab Units 12/23/23  0047 12/22/23 0457 12/19/23  0853 12/18/23  0512 12/17/23  0513   ALBUMIN g/dL 3.1* 2.7* 2.8* 2.8* 3.7   BILIRUBIN mg/dL 0.7 1.0  --  0.2 0.2   ALK PHOS U/L 212* 167*  --  76 88   AST (SGOT) U/L 45* 66*  --  11 6   ALT (SGPT) U/L 53* 36  --  9 11     Results from last 7 days   Lab Units 12/23/23  0047 12/22/23 0457 12/21/23 1826 12/21/23  0854 12/20/23  1636 12/20/23  0521 12/19/23 2053 12/19/23  0853 12/18/23  1814 12/18/23  0512   CALCIUM mg/dL 8.2* 7.9*  --  7.6*  --  8.1*  --  8.0*  --  7.5*   ALBUMIN g/dL 3.1* 2.7*  --   --   --   --   --  2.8*  --  2.8*   MAGNESIUM mg/dL 1.8 1.9  --  2.0  --  2.8*  --  1.5*  --  1.7   PHOSPHORUS mg/dL 2.6 2.3* 2.3* 2.1*   < > 2.2*   < > 1.9*   < > 2.2*    < > = values in this interval not displayed.     Results from last 7 days   Lab Units 12/21/23  0854 12/17/23  0513   PROCALCITONIN ng/mL 0.13 0.13   LACTATE mmol/L  --  1.3     No results found for: \"HGBA1C\", \"POCGLU\"    No radiology results for the last day    Scheduled Meds  cholestyramine, 1 packet, Oral, Q12H  mirtazapine, 30 mg, Oral, Nightly  polyethylene glycol, 0.5 bottle, Oral, Q12H  sodium chloride, 10 mL, Intravenous, Q12H    Continuous Infusions  sodium chloride, 1,000 mL, Last Rate: 1,000 mL (12/23/23 1012)  sodium chloride, 30 mL/hr    PRN Meds    Calcium Replacement - Follow Nurse / BPA Driven Protocol    influenza vaccine    Magnesium Standard Dose Replacement - Follow Nurse / BPA " Driven Protocol    melatonin    ondansetron    Phosphorus Replacement - Follow Nurse / BPA Driven Protocol    Potassium Replacement - Follow Nurse / BPA Driven Protocol    prochlorperazine    [COMPLETED] Insert Peripheral IV **AND** sodium chloride    sodium chloride    sodium chloride    sodium chloride    Diet: Liquid Diets; Clear Liquid; Texture: Regular Texture (IDDSI 7); Fluid Consistency: Thin (IDDSI 0)    I have personally reviewed:  [x]  Medications  [x]  Laboratory   []  Microbiology   []  Radiology   [x]  EKG/Telemetry sinus on telemetry  []  Cardiology/Vascular   []  Pathology   []  Records     12/22/2023  egd: reflux esophagitis, 10cm HH, gastritis: ppi bid  c-scope: inadequate prep     Assessment/Plan     Active Hospital Problems    Diagnosis  POA    **Diarrhea [R19.7]  Yes    Gastroenteritis due to sapovirus [A08.31]  Unknown    Severe malnutrition [E43]  Yes    Major depression, single episode [F32.9]  Yes    Anemia [D64.9]  Yes    Benign essential hypertension [I10]  Yes    Hyperlipidemia [E78.5]  Yes      Resolved Hospital Problems   No resolved problems to display.       83 y.o. male admitted with Diarrhea.    Diarrhea  Nausea and vomiting  Sapovirus positive  Overall he has improved.   CT scan 12/20/2023 showing fluid and fat stranding around moderate hiatal hernia, esophageal wall thickening proximal to hiatal hernia  EGD and colonoscopy yesterday noted. Repeating colonoscopy due to poor prep    Persistent leukocytosis  Appreciate ID, feel is probably reactive. Strongyloides and stool O&P added  Remains afebrile. Procalcitonin and lactate were not elevated. Repeat procalcitonin yesterday not elevated. Blood cultures negative at 5 days. C. difficile negative.  Continue to monitor off antibiotics.     Weight loss (20 lbs over a year)  Anorexia  Malnutrition  Weight loss and anorexia seems chronic problem   Correcting electrolytes  EGD and colonoscopy yesterday above. Repeating colonoscopy due to  poor prep    Depression  History of waxing and waning confusion August admission- underlying dementia suspected  Appreciate psychiatry-Remeron    Right middle lobe nodule seen on abdomen pelvis CT  CT of the chest showed emphysema and a few scattered pulmonary nodules up to 0.6 cm in the right middle lobe nonspecific. Follow-up CT chest 3 to 6 months.    Hypertension  BP is fine    Discharge  He says he is weak and has not gotten up. May need SNF. PT re-eval  Expected discharge date/ time has not been documented.     Discussed with patient and nursing staff     Manish Gracia MD  [facility]  12/23/23

## 2023-12-23 NOTE — PLAN OF CARE
Goal Outcome Evaluation:  Plan of Care Reviewed With: patient        Progress: improving  Outcome Evaluation: Patient continues with bowel prep. Stool is watery and light brown in color.       Patient going for Colonoscopy this am. Continuing with bowel prep.

## 2023-12-23 NOTE — ANESTHESIA POSTPROCEDURE EVALUATION
"Patient: Jewel Best    Procedure Summary       Date: 12/23/23 Room / Location: [facility] ENDOSCOPY 1 /  [facility]    Anesthesia Start: 1044 Anesthesia Stop: 1117    Procedure: COLONOSCOPY INTO CECUM WITH BIOPSIES Diagnosis:       Severe malnutrition      (Severe malnutrition [E43])    Surgeons: See Welsh MD Provider: Raji Stoner MD    Anesthesia Type: MAC ASA Status: 3            Anesthesia Type: MAC    Vitals  Vitals Value Taken Time   /71 12/23/23 1135   Temp     Pulse 91 12/23/23 1135   Resp 18 12/23/23 1135   SpO2 96 % 12/23/23 1135           Post Anesthesia Care and Evaluation    Pain management: adequate    Airway patency: patent  Anesthetic complications: No anesthetic complications    Cardiovascular status: acceptable  Respiratory status: acceptable  Hydration status: acceptable    Comments: /71 (BP Location: Left arm, Patient Position: Sitting)   Pulse 91   Temp 36.8 °C (98.3 °F) (Oral)   Resp 18   Ht 177.8 cm (70\")   Wt 72.9 kg (160 lb 11.5 oz)   SpO2 96%   BMI 23.06 kg/m²       "

## 2023-12-24 VITALS
RESPIRATION RATE: 18 BRPM | OXYGEN SATURATION: 97 % | HEART RATE: 84 BPM | DIASTOLIC BLOOD PRESSURE: 51 MMHG | TEMPERATURE: 97.7 F | BODY MASS INDEX: 22.76 KG/M2 | SYSTOLIC BLOOD PRESSURE: 97 MMHG | HEIGHT: 70 IN | WEIGHT: 158.95 LBS

## 2023-12-24 LAB
ANION GAP SERPL CALCULATED.3IONS-SCNC: 8 MMOL/L (ref 5–15)
BUN SERPL-MCNC: 9 MG/DL (ref 8–23)
BUN/CREAT SERPL: 9.4 (ref 7–25)
CALCIUM SPEC-SCNC: 7.8 MG/DL (ref 8.6–10.5)
CHLORIDE SERPL-SCNC: 106 MMOL/L (ref 98–107)
CO2 SERPL-SCNC: 25 MMOL/L (ref 22–29)
CREAT SERPL-MCNC: 0.96 MG/DL (ref 0.76–1.27)
DEPRECATED RDW RBC AUTO: 43.7 FL (ref 37–54)
EGFRCR SERPLBLD CKD-EPI 2021: 77.5 ML/MIN/1.73
ERYTHROCYTE [DISTWIDTH] IN BLOOD BY AUTOMATED COUNT: 13.4 % (ref 12.3–15.4)
GLUCOSE SERPL-MCNC: 124 MG/DL (ref 65–99)
HCT VFR BLD AUTO: 30.8 % (ref 37.5–51)
HGB BLD-MCNC: 10.6 G/DL (ref 13–17.7)
MAGNESIUM SERPL-MCNC: 1.8 MG/DL (ref 1.6–2.4)
MCH RBC QN AUTO: 30.5 PG (ref 26.6–33)
MCHC RBC AUTO-ENTMCNC: 34.4 G/DL (ref 31.5–35.7)
MCV RBC AUTO: 88.5 FL (ref 79–97)
PHOSPHATE SERPL-MCNC: 3 MG/DL (ref 2.5–4.5)
PLATELET # BLD AUTO: 252 10*3/MM3 (ref 140–450)
PMV BLD AUTO: 10.1 FL (ref 6–12)
POTASSIUM SERPL-SCNC: 3.3 MMOL/L (ref 3.5–5.2)
RBC # BLD AUTO: 3.48 10*6/MM3 (ref 4.14–5.8)
SODIUM SERPL-SCNC: 139 MMOL/L (ref 136–145)
WBC NRBC COR # BLD AUTO: 17.75 10*3/MM3 (ref 3.4–10.8)

## 2023-12-24 PROCEDURE — 97530 THERAPEUTIC ACTIVITIES: CPT

## 2023-12-24 PROCEDURE — 83735 ASSAY OF MAGNESIUM: CPT | Performed by: INTERNAL MEDICINE

## 2023-12-24 PROCEDURE — 80048 BASIC METABOLIC PNL TOTAL CA: CPT | Performed by: INTERNAL MEDICINE

## 2023-12-24 PROCEDURE — 84100 ASSAY OF PHOSPHORUS: CPT | Performed by: INTERNAL MEDICINE

## 2023-12-24 PROCEDURE — 99232 SBSQ HOSP IP/OBS MODERATE 35: CPT | Performed by: NURSE PRACTITIONER

## 2023-12-24 PROCEDURE — 85027 COMPLETE CBC AUTOMATED: CPT | Performed by: INTERNAL MEDICINE

## 2023-12-24 RX ORDER — MIRTAZAPINE 15 MG/1
30 TABLET, FILM COATED ORAL NIGHTLY
Qty: 60 TABLET | Refills: 0 | Status: SHIPPED | OUTPATIENT
Start: 2023-12-24

## 2023-12-24 RX ORDER — PANTOPRAZOLE SODIUM 40 MG/1
40 TABLET, DELAYED RELEASE ORAL
Status: DISCONTINUED | OUTPATIENT
Start: 2023-12-25 | End: 2023-12-25 | Stop reason: HOSPADM

## 2023-12-24 RX ORDER — POTASSIUM CHLORIDE 750 MG/1
40 TABLET, FILM COATED, EXTENDED RELEASE ORAL EVERY 4 HOURS
Status: COMPLETED | OUTPATIENT
Start: 2023-12-24 | End: 2023-12-24

## 2023-12-24 RX ORDER — PANTOPRAZOLE SODIUM 40 MG/1
40 TABLET, DELAYED RELEASE ORAL
Qty: 30 TABLET | Refills: 0 | Status: SHIPPED | OUTPATIENT
Start: 2023-12-25 | End: 2023-12-24 | Stop reason: SDUPTHER

## 2023-12-24 RX ORDER — PANTOPRAZOLE SODIUM 40 MG/1
40 TABLET, DELAYED RELEASE ORAL 2 TIMES DAILY
Qty: 90 TABLET | Refills: 0 | Status: SHIPPED | OUTPATIENT
Start: 2023-12-24 | End: 2024-02-07

## 2023-12-24 RX ADMIN — Medication 10 ML: at 08:52

## 2023-12-24 RX ADMIN — CHOLESTYRAMINE 1 PACKET: 4 POWDER, FOR SUSPENSION ORAL at 08:52

## 2023-12-24 RX ADMIN — POTASSIUM CHLORIDE 40 MEQ: 750 TABLET, EXTENDED RELEASE ORAL at 05:47

## 2023-12-24 RX ADMIN — POTASSIUM CHLORIDE 40 MEQ: 750 TABLET, EXTENDED RELEASE ORAL at 08:52

## 2023-12-24 NOTE — THERAPY RE-EVALUATION
Patient Name: Jewel Best  : 1940    MRN: [id number]                              Today's Date: 2023       Admit Date: 2023    Visit Dx:     ICD-10-CM ICD-9-CM   1. Nausea vomiting and diarrhea  R11.2 787.91    R19.7 787.01   2. Leukocytosis, unspecified type  D72.829 288.60   3. Poor appetite  R63.0 783.0   4. Failure to thrive in adult  R62.7 783.7   5. Hypokalemia  E87.6 276.8   6. Gastroenteritis due to sapovirus  A08.31 008.65   7. Severe malnutrition  E43 261   8. Diarrhea, unspecified type  R19.7 787.91     Patient Active Problem List   Diagnosis    Anemia    Anxiety disorder    Benign essential hypertension    Eczema    Hyperlipidemia    Fistula of skin    Lymphoma in remission    Body mass index (BMI) 30.0-30.9, adult     Major depression, single episode    COVID-19    VETO (acute kidney injury)    Mild cognitive impairment    Diarrhea    Severe malnutrition    Gastroenteritis due to sapovirus     Past Medical History:   Diagnosis Date    Anemia     Anxiety     Bladder cancer     Depression     Eating disorder     Hyperlipidemia     Hypertension     Lymphoma      Past Surgical History:   Procedure Laterality Date    COLONOSCOPY      HIP SURGERY Right     KNEE SURGERY      bakers cyst removal      General Information       Row Name 23 1119          Physical Therapy Time and Intention    Document Type re-evaluation  -ST     Mode of Treatment physical therapy  -       Row Name 23 1119          General Information    Patient Profile Reviewed yes  -ST     Prior Level of Function independent:  -ST     Existing Precautions/Restrictions fall  -ST       Row Name 23 1119          Living Environment    People in Home alone  -ST       Row Name 23 1119          Cognition    Orientation Status (Cognition) oriented x 4  -ST       Row Name 23 1119          Safety Issues, Functional Mobility    Impairments Affecting Function (Mobility) balance;endurance/activity  tolerance;postural/trunk control;pain  -ST     Comment, Safety Issues/Impairments (Mobility) gait belt, nonskid socks donned  -ST               User Key  (r) = Recorded By, (t) = Taken By, (c) = Cosigned By      Initials Name Provider Type    Savannah Rogers, PT Physical Therapist                   Mobility       Row Name 12/24/23 1120          Bed Mobility    Bed Mobility supine-sit;sit-supine  -ST     Sit-Supine [place] (Bed Mobility) independent  -ST     Assistive Device (Bed Mobility) bed rails  -       Row Name 12/24/23 1120          Sit-Stand Transfer    Sit-Stand [place] (Transfers) supervision  -       Row Name 12/24/23 1120          Gait/Stairs (Locomotion)    [place] Level (Gait) standby assist  -ST     Distance in Feet (Gait) 30' x 3  -ST     Deviations/Abnormal Patterns (Gait) gait speed decreased;stride length decreased;christy decreased  -ST     Bilateral Gait Deviations forward flexed posture;heel strike decreased  -ST     Comment, (Gait/Stairs) no AD needed  -ST               User Key  (r) = Recorded By, (t) = Taken By, (c) = Cosigned By      Initials Name Provider Type    Savannah Rogers, PT Physical Therapist                   Obj/Interventions       Row Name 12/24/23 1120          Range of Motion Comprehensive    Comment, General Range of Motion bilat LE WFL  -ST       [facility] Name 12/24/23 1120          Strength Comprehensive (MMT)    Comment, General Manual Muscle Testing (MMT) Assessment bilat LE L  -ST       Row Name 12/24/23 1120          Balance    Comment, Balance SBA with mobility. no LOB noted  -ST               User Key  (r) = Recorded By, (t) = Taken By, (c) = Cosigned By      Initials Name Provider Type    Savannah Rogers, PT Physical Therapist                   Goals/Plan    No documentation.                  Clinical Impression       Row Name 12/24/23 1120          Pain    Pretreatment Pain Rating 0/10 - no pain  -ST     Posttreatment Pain Rating  0/10 - no pain  -ST       Row Name 12/24/23 1120          Plan of Care Review    Plan of Care Reviewed With patient  -ST     Outcome Evaluation Pt seen for re-evaluation as pt reported to MD that he felt weak and per nursing pt has not been out of bed. Pt denies feeling weak today but admits he is not moving as well as he was at home. Pt noted to be SBA with mobility previous eval by PT. Pt continues to be SBA at this time - ambulated in room x 3 laps and no LOB noted and requires no AD at this time. no acute PT goals identified, will s/o. Pt plans return home  -       Row Name 12/24/23 1120          Therapy Assessment/Plan (PT)    Patient/Family Therapy Goals Statement (PT) --  -ST     Criteria for Skilled Interventions Met (PT) no;no problems identified which require skilled intervention  -ST     Therapy Frequency (PT) evaluation only  -ST       Row Name 12/24/23 1120          Positioning and Restraints    Pre-Treatment Position in bed  -ST     Post Treatment Position chair  -ST     In Chair notified nsg;reclined;call light within reach;encouraged to call for assist;exit alarm on  -ST               User Key  (r) = Recorded By, (t) = Taken By, (c) = Cosigned By      Initials Name Provider Type    Savannah Rogers, PT Physical Therapist                   Outcome Measures       Row Name 12/24/23 1121          How much help from another person do you currently need...    Turning from your back to your side while in flat bed without using bedrails? 4  -ST     Moving from lying on back to sitting on the side of a flat bed without bedrails? 4  -ST     Moving to and from a bed to a chair (including a wheelchair)? 3  -ST     Standing up from a chair using your arms (e.g., wheelchair, bedside chair)? 4  -ST     Climbing 3-5 steps with a railing? 3  -ST     To walk in hospital room? 4  -ST     AM-PAC 6 Clicks Score (PT) 22  -ST     Highest Level of Mobility Goal 7 --> Walk 25 feet or more  -       Row Name 12/24/23  1121          Functional Assessment    Outcome Measure Options AM-PAC 6 Clicks Basic Mobility (PT)  -ST               User Key  (r) = Recorded By, (t) = Taken By, (c) = Cosigned By      Initials Name Provider Type    Savannah Rogers PT Physical Therapist                                   PT Recommendation and Plan     Plan of Care Reviewed With: patient  Outcome Evaluation: Pt seen for re-evaluation as pt reported to MD that he felt weak and per nursing pt has not been out of bed. Pt denies feeling weak today but admits he is not moving as well as he was at home. Pt noted to be SBA with mobility previous eval by PT. Pt continues to be SBA at this time - ambulated in room x 3 laps and no LOB noted and requires no AD at this time. no acute PT goals identified, will s/o. Pt plans return home     Time Calculation:         PT Charges       Row Name 12/24/23 1124             Time Calculation    Start Time 1038  -ST      Stop Time 1050  -ST      Time Calculation (min) 12 min  -ST      PT Received On 12/24/23  -ST         Time Calculation- PT    Total Timed Code Minutes- PT 12 minute(s)  -ST                User Key  (r) = Recorded By, (t) = Taken By, (c) = Cosigned By      Initials Name Provider Type    Savannah Rogers PT Physical Therapist                  Therapy Charges for Today       Code Description Service Date Service Provider Modifiers Qty    [id number]  PT THERAPEUTIC ACT EA 15 MIN 12/24/2023 Savannah Caputo PT GP 1            PT G-Codes  Outcome Measure Options: AM-PAC 6 Clicks Basic Mobility (PT)  AM-PAC 6 Clicks Score (PT): 22  PT Discharge Summary  Anticipated Discharge Disposition (PT): home    Savannah Caputo PT  12/24/2023

## 2023-12-24 NOTE — PLAN OF CARE
Problem: Adult Inpatient Plan of Care  Goal: Plan of Care Review  Recent Flowsheet Documentation  Taken 12/24/2023 1120 by Savannah Caputo PT  Plan of Care Reviewed With: patient  Outcome Evaluation: Pt seen for re-evaluation as pt reported to MD that he felt weak and per nursing pt has not been out of bed. Pt denies feeling weak today but admits he is not moving as well as he was at home. Pt noted to be SBA with mobility previous eval by PT. Pt continues to be SBA at this time - ambulated in room x 3 laps and no LOB noted and requires no AD at this time. no acute PT goals identified, will s/o. Pt plans return home   Goal Outcome Evaluation:  Plan of Care Reviewed With: patient                  Anticipated Discharge Disposition (PT): home

## 2023-12-24 NOTE — PROGRESS NOTES
Gastroenterology   Inpatient Progress Note    Reason for Follow Up: Esophagitis, hiatal hernia    Subjective  Interval History:   Patient is not currently taking PPI, based on EGD, recommend twice daily PPI upon discharge, spoke with patient's daughter via telephone and discussed recommendations as well as discussed with patient and nurse at bedside.    Current Facility-Administered Medications:     Calcium Replacement - Follow Nurse / BPA Driven Protocol, , Does not apply, Blaise SAGE Brian David, MD    cholestyramine (QUESTRAN) packet 1 packet, 1 packet, Oral, Q12H, See Welsh MD, 1 packet at 12/24/23 0852    Influenza Vac High-Dose Quad (FLUZONE HIGH DOSE) injection 0.7 mL, 0.7 mL, Intramuscular, During Hospitalization, See Welsh MD    Magnesium Standard Dose Replacement - Follow Nurse / BPA Driven Protocol, , Does not apply, Blaise SAGE Brian David, MD    melatonin tablet 3 mg, 3 mg, Oral, Nightly PRN, See Welsh MD, 3 mg at 12/18/23 2300    mirtazapine (REMERON) tablet 30 mg, 30 mg, Oral, Nightly, See Welsh MD, 30 mg at 12/22/23 2103    ondansetron (ZOFRAN) injection 4 mg, 4 mg, Intravenous, Q6H PRN, See Welsh MD, 4 mg at 12/23/23 0335    Phosphorus Replacement - Follow Nurse / BPA Driven Protocol, , Does not apply, Blaise SAGE Brian David, MD    Potassium Replacement - Follow Nurse / BPA Driven Protocol, , Does not apply, Blaise SAGE Brian David, MD    prochlorperazine (COMPAZINE) injection 5 mg, 5 mg, Intravenous, Q6H PRN, See Welsh MD, 5 mg at 12/20/23 1703    [COMPLETED] Insert Peripheral IV, , , Once **AND** sodium chloride 0.9 % flush 10 mL, 10 mL, Intravenous, PRN, See Welsh MD    sodium chloride 0.9 % flush 10 mL, 10 mL, Intravenous, Q12H, See Welsh MD, 10 mL at 12/23/23 2038    sodium chloride 0.9 % flush 10 mL, 10 mL, Intravenous, PRN, Blaise, See Pugh MD    sodium chloride 0.9 %  infusion 1,000 mL, 1,000 mL, Intravenous, Continuous, See Welsh MD, Stopped at 12/23/23 1124    sodium chloride 0.9 % infusion 40 mL, 40 mL, Intravenous, PRN, See Welsh MD    sodium chloride 0.9 % infusion, 30 mL/hr, Intravenous, Continuous PRNBlaise Brian David, MD  Review of Systems:               The following systems were reviewed and negative;  gastrointestinal    Objective     Vital Signs  Temp:  [97.9 °F (36.6 °C)-99 °F (37.2 °C)] 98.2 °F (36.8 °C)  Heart Rate:  [] 85  Resp:  [16-19] 18  BP: (106-132)/(41-78) 106/41  Body mass index is 22.81 kg/m².                  Physical Exam:              General: patient awake, alert               Eyes: no scleral icterus              Skin: warm and dry, not jaundiced               Psychiatric: Appropriate affect and behavior                Results Review:                I reviewed the patient's new clinical results.    Results from last 7 days   Lab Units 12/24/23  0321 12/23/23  2247 12/23/23  0047 12/22/23  1840 12/22/23  0457   WBC 10*3/mm3 17.75*  --  19.61*  --  19.51*   HEMOGLOBIN g/dL 10.6* 13.3 12.6*   < > 12.6*   HEMATOCRIT % 30.8* 39.8 37.0*   < > 37.0*   PLATELETS 10*3/mm3 252  --  305  --  293    < > = values in this interval not displayed.     Results from last 7 days   Lab Units 12/24/23  0321 12/23/23  0047 12/22/23  0457 12/18/23  1814 12/18/23  0512   SODIUM mmol/L 139 140 140   < > 139   POTASSIUM mmol/L 3.3* 3.8 4.0   < > 3.2*   CHLORIDE mmol/L 106 106 109*   < > 109*   CO2 mmol/L 25.0 21.8* 20.5*   < > 17.9*   BUN mg/dL 9 9 8   < > 16   CREATININE mg/dL 0.96 0.92 0.94   < > 1.04   CALCIUM mg/dL 7.8* 8.2* 7.9*   < > 7.5*   BILIRUBIN mg/dL  --  0.7 1.0  --  0.2   ALK PHOS U/L  --  212* 167*  --  76   ALT (SGPT) U/L  --  53* 36  --  9   AST (SGOT) U/L  --  45* 66*  --  11   GLUCOSE mg/dL 124* 113* 102*   < > 79    < > = values in this interval not displayed.     Results from last 7 days   Lab Units 12/23/23  0047  12/22/23  0457   INR  1.26* 1.36*     Lab Results   Lab Value Date/Time    LIPASE 16 12/13/2023 1008    LIPASE 11 12/11/2023 1316       Radiology:  CT Chest Without Contrast Diagnostic   Final Result       1.  A moderate fluid-filled hiatal hernia has decreased in size from   12/13/2023, however, there is new adjacent small volume fluid and fat   stranding, which raises concern for possible hernia incarceration and/or   superimposed inflammation. Wall thickening of the esophagus proximal to   the hiatal hernia is incompletely assessed and could reflect reflux   esophagitis. However, recommend GI evaluation and follow-up upper   endoscopy.   2.  Fluid-filled stomach and partially imaged fluid-filled small bowel   loops are nonspecific but could potentially reflect a gastroenteritis in   the appropriate clinical setting.   3.  A few scattered pulmonary nodules measuring up to 0.6 cm in the   right middle lobe are nonspecific. Recommend follow-up CT chest in 3 to   6 months.   4.  Emphysema.                   This report was finalized on 12/20/2023 11:17 AM by Dr. Laura Woodruff M.D on Workstation: [id number]              Assessment & Plan     Active Hospital Problems    Diagnosis     **Diarrhea     Gastroenteritis due to sapovirus     Severe malnutrition     Major depression, single episode     Anemia     Benign essential hypertension     Hyperlipidemia        Assessment:  Diarrhea with Sapovirus  Dehydration  Weight loss with Poor appetite  Leukocytosis  EGD 12/22/2023 with Large hiatal hernia, esophagitis and gastritis        Plan:  Pathology from EGD and colonoscopy pending  Normal Celiac labs and TSH  Strict reflux precautions For Hiatal hernia and GERD, follow antireflux precautions.  Recommend avoiding eating within 3 to 4 hours of bedtime.  Avoid foods that can trigger symptoms which may include citrus fruits, spicy foods, tomatoes and red sauces, chocolate, coffee/tea, caffeinated or carbonated beverages,  alcohol.  Pantoprazole 40 mg twice daily x 6 weeks    Okay for discharge from GI standpoint    I discussed the patients findings and my recommendations with patient, family, and nursing staff.           Kellen GREEN  [facility] [place]  [address] [place] [address]

## 2023-12-24 NOTE — DISCHARGE SUMMARY
[facility]    Date of Discharge:  12/24/2023    PCP: Ernie Panda MD    Discharge Diagnosis:   Active Hospital Problems    Diagnosis  POA    **Diarrhea [R19.7]  Yes    Gastroenteritis due to sapovirus [A08.31]  Unknown    Severe malnutrition [E43]  Yes    Major depression, single episode [F32.9]  Yes    Anemia [D64.9]  Yes    Benign essential hypertension [I10]  Yes    Hyperlipidemia [E78.5]  Yes      Resolved Hospital Problems   No resolved problems to display.     Procedure(s):  COLONOSCOPY INTO CECUM WITH BIOPSIES  12/23 1042 COLONOSCOPY  12/22 1055 COLONOSCOPY  12/22 1053 UPPER GI ENDOSCOPY  Consults       Date and Time Order Name Status Description    12/22/2023  9:40 AM Inpatient Infectious Diseases Consult Completed     12/19/2023 11:18 AM Inpatient Psychiatrist Consult Completed     12/17/2023 10:40 AM Inpatient Gastroenterology Consult Completed     12/17/2023  6:37 AM Inpatient Gastroenterology Consult      12/17/2023  6:15 AM [facility] (on-call MD unless specified) Details      12/13/2023  1:45 PM Inpatient Gastroenterology Consult Completed           Hospital Course  83 y.o. male admitted with diarrhea and diagnosed with Sapovirus infection. He was treated with supportive care and symptoms gradually improved. He also has had weight loss and anorexia. He is lost about 20 pounds over a year. He was seen by gastroenterology and EGD and colonoscopy showed reflux esophagitis and a 10 cm hiatal hernia. First colonoscopy was an inadequate prep he had it repeated and biopsies were done for possible microscopic colitis. GI recommended 6 weeks of twice daily PPI.    CT of the chest abdomen pelvis without contrast showed a few scattered pulmonary nodules measuring up to 0.6 cm. Radiology recommends a follow-up CT of the chest in 3 to 6 months.     He seemed a little depressed and he was started on Remeron. That could have contributed to his anorexia and  weight loss also. Psychiatry was asked to see and they optimized the dose. He also had persistent leukocytosis and ID felt it was reactive from Sapovirus infection. Strongyloides and stool O&P were added.    Psychiatry adjusted the Remeron that was started for his appetite. Also persistent leukocytosis ID thought it was just reactive from Sapovirus. Got CT chest abdomen pelvis for weight loss and ID workup. Right middle lobe nodule on CT needs repeat CT 3 to 6-month    I discussed the patient's findings and my recommendations with patient and nursing staff. He is feeling improved and very much wants to go home today. He will need close to follow-up with PCP to ensure that anorexia and weight is improved. Initially was felt that he might need SNF however he worked with PT today and needed minimal assistance.    Temp:  [97.9 °F (36.6 °C)-99 °F (37.2 °C)] 98.2 °F (36.8 °C)  Heart Rate:  [] 85  Resp:  [18] 18  BP: (106-131)/(41-78) 106/41  Body mass index is 22.81 kg/m².    Physical Exam  Constitutional:       General: He is not in acute distress.     Appearance: Normal appearance. He is not toxic-appearing.   HENT:      Head: Normocephalic and atraumatic.   Cardiovascular:      Rate and Rhythm: Normal rate and regular rhythm.   Pulmonary:      Effort: Pulmonary effort is normal. No respiratory distress.      Breath sounds: Normal breath sounds.   Abdominal:      General: Bowel sounds are normal.      Palpations: Abdomen is soft.      Tenderness: There is no abdominal tenderness. There is no guarding or rebound.   Musculoskeletal:         General: No swelling.   Skin:     General: Skin is warm and dry.   Neurological:      General: No focal deficit present.      Mental Status: He is alert and oriented to person, place, and time.   Psychiatric:         Mood and Affect: Mood normal.         Behavior: Behavior normal.       Disposition: Home or Self Care       Discharge Medications        New Medications         Instructions Start Date   mirtazapine 15 MG tablet  Commonly known as: REMERON   30 mg, Oral, Nightly      pantoprazole 40 MG EC tablet  Commonly known as: PROTONIX   40 mg, Oral, 2 Times Daily             Continue These Medications        Instructions Start Date   cholestyramine 4 g packet  Commonly known as: QUESTRAN   1 packet, Oral, 2 Times Daily PRN             Stop These Medications      FLUoxetine 20 MG capsule  Commonly known as: PROzac     lisinopril 10 MG tablet  Commonly known as: PRINIVIL,ZESTRIL             Diet Instructions       Diet: Regular/House Diet      Discharge Diet: Regular/House Diet    Texture: Regular Texture (IDDSI 7)    Fluid Consistency: Thin (IDDSI 0)           Activity Instructions       Activity as Tolerated             Additional Instructions for the Follow-ups that You Need to Schedule       Call MD for problems / concerns.   As directed             Contact information for follow-up providers       Ernie Panda MD Follow up in 1 week(s).    Specialty: Internal Medicine  Why: post hospital follow up. Needs follow-up CT chest in 3 to 6 months (nodules)  Contact information:  [address] [place] [address]  [phone]                       Contact information for after-discharge care       Home Medical Care       [facility] .    Services: Home Rehabilitation, Home Nursing  Contact information:  [address], [address]  [facility] [id number] [phone]                                  Future Appointments   Date Time Provider Department Center   12/29/2023  2:30 PM Ernie Panda MD [facility]     Pending Labs       Order Current Status    Tissue Pathology Exam Collected (12/23/23 1105)    Strongyloides Antibody IgG, BHAVIK In process    Tissue Pathology Exam In process           Manish Garcia MD  [facility]  12/24/23    Discharge time spent greater than 30 minutes.

## 2023-12-24 NOTE — PROGRESS NOTES
Spoke at length with patient today regarding his need to comply with mirtazapine as this medication should increase his appetite and assist with sleep.  I have written down the details of this medication and have spoken with him regarding the positive side effects of this medication with regards to sleep and appetite.  I have strongly encouraged him to comply with mirtazapine and he agrees to do so.

## 2023-12-24 NOTE — PLAN OF CARE
Goal Outcome Evaluation:               Pt is A&Ox4, pt being discharged home via daughter 8pm.

## 2023-12-24 NOTE — PLAN OF CARE
Goal Outcome Evaluation:      Pt has several BM's. He ate his snack of chips and salsa. Pt reports feeling sad because his daughter has not visited him in two days. Pt refused mirtazapine 30mg oral tablet. Spoke with daughter Meli. She asked why pt did not take his medicine. Pt said he wasn't on it before and his wasn't going to take it. Daughter said him refusing to take his medicine was going to be an issue with discharge. She would like to know what the plan of care is for her father. Pt heart rate still tachy at times.

## 2023-12-25 ENCOUNTER — READMISSION MANAGEMENT (OUTPATIENT)
Dept: CALL CENTER | Facility: HOSPITAL | Age: 83
End: 2023-12-25
Payer: MEDICARE

## 2023-12-25 NOTE — OUTREACH NOTE
Prep Survey      Flowsheet Row Responses   [facility] patient discharged from? [place]   Is LACE score < 7 ? No   Eligibility [facility]   Date of Admission 12/17/23   Date of Discharge 12/25/23   Discharge Disposition Home-Health Care Svc   Discharge diagnosis Diarrhea   Does the patient have one of the following disease processes/diagnoses(primary or secondary)? Other   Does the patient have Home health ordered? Yes   What is the Home health agency?  [facility]   Is there a DME ordered? No   Prep survey completed? Yes            Christi JOYNER - Registered Nurse

## 2023-12-26 ENCOUNTER — TRANSITIONAL CARE MANAGEMENT TELEPHONE ENCOUNTER (OUTPATIENT)
Dept: CALL CENTER | Facility: HOSPITAL | Age: 83
End: 2023-12-26
Payer: MEDICARE

## 2023-12-26 LAB
LAB AP CASE REPORT: NORMAL
LAB AP CASE REPORT: NORMAL
LAB AP DIAGNOSIS COMMENT: NORMAL
PATH REPORT.ADDENDUM SPEC: NORMAL
PATH REPORT.FINAL DX SPEC: NORMAL
PATH REPORT.FINAL DX SPEC: NORMAL
PATH REPORT.GROSS SPEC: NORMAL
PATH REPORT.GROSS SPEC: NORMAL

## 2023-12-26 NOTE — OUTREACH NOTE
Call Center TCM Note      Flowsheet Row Responses   [facility] patient discharged from? [place]   Does the patient have one of the following disease processes/diagnoses(primary or secondary)? Other   TCM attempt successful? No  [pcp vr over a year and had no one listed]   Unsuccessful attempts Attempt 1   Call Status Left message            Yuliana Duval RN    12/26/2023, 10:49 EST

## 2023-12-26 NOTE — OUTREACH NOTE
Call Center TCM Note      Flowsheet Row Responses   [facility] patient discharged from? [place]   Does the patient have one of the following disease processes/diagnoses(primary or secondary)? Other   TCM attempt successful? Yes   Call start time 1253   Call end time 1300   General alerts for this patient Dtr is a nurse   Discharge diagnosis Diarrhea   Person spoke with today (if not patient) and relationship patient and dtr   Meds reviewed with patient/caregiver? Yes   Is the patient having any side effects they believe may be caused by any medication additions or changes? No   Does the patient have all medications ordered at discharge? Yes   Is the patient taking all medications as directed (includes completed medication regime)? No   What is preventing the patient from taking all medications as directed? Other  [Dtr reports pt does not take meds as orderede, does not want to take them]   Comments appt on 12/29/23 @2:30pm   Does the patient have an appointment with their PCP within 7-14 days of discharge? Yes   What is the Home health agency?  [facility]   Has home health visited the patient within 72 hours of discharge? Call prior to 72 hours   Home health comments Dtr reports pt may refuse HH, but in the hospital was agreeable   Psychosocial issues? Yes  [Dtr reports pt is currently confused/angry]   What is the patient's perception of their health status since discharge? Same   Is the patient/caregiver able to teach back the hierarchy of who to call/visit for symptoms/problems? PCP, Specialist, Home health nurse, Urgent Care, ED, 911 Yes   TCM call completed? Yes   Wrap up additional comments Pt reports about the same, confused and upset while on the phone, ended the call. RN called dtr to inform of pt's condition, which she reports is his current stated of mind.   Call end time 1300   Would this patient benefit from a Referral to [facility] Social Work? No   Is the patient interested in additional calls from an  ambulatory ? No            Yuliana Duval RN    12/26/2023, 13:04 EST

## 2023-12-27 NOTE — CASE MANAGEMENT/SOCIAL WORK
Case Management Discharge Note      Final Note: Home with [facility] and no additional dc orders noted for CCP. NILA SHANKS/CCP    Provided Post Acute Provider List?: N/A  Provided Post Acute Provider Quality & Resource List?: N/A    Selected Continued Care - Discharged on 12/24/2023 Admission date: 12/17/2023 - Discharge disposition: Home or Self Care      Destination    No services have been selected for the patient.                Durable Medical Equipment    No services have been selected for the patient.                Dialysis/Infusion    No services have been selected for the patient.                Home Medical Care Coordination complete.      Service Provider Selected Services Address Phone Fax Patient Preferred    [facility] ,  Home Nursing [address], [address], [place] [id number] [phone] --              Therapy    No services have been selected for the patient.                Community Resources    No services have been selected for the patient.                Community & DME    No services have been selected for the patient.                    Selected Continued Care - Episodes Includes continued care and service providers with selected services from the active episodes listed below      High Risk Care Management Episode start date: 9/28/2023   There are no active outsourced providers for this episode.                 Transportation Services  Private: Car    Final Discharge Disposition Code: 06 - home with home health care

## 2023-12-27 NOTE — PROGRESS NOTES
Non specific inflammation on colonoscopy  Recommend office f/u with Dr Reed or JOSE RAUL in 2-4 weeks to review

## 2023-12-28 ENCOUNTER — TELEPHONE (OUTPATIENT)
Dept: FAMILY MEDICINE CLINIC | Facility: CLINIC | Age: 83
End: 2023-12-28
Payer: MEDICARE

## 2023-12-28 LAB — STRONGYLOIDES IGG SER QL IA: NEGATIVE

## 2023-12-28 RX ORDER — ONDANSETRON 4 MG/1
4 TABLET, FILM COATED ORAL EVERY 8 HOURS PRN
Qty: 12 TABLET | Refills: 0 | Status: SHIPPED | OUTPATIENT
Start: 2023-12-28

## 2023-12-28 NOTE — TELEPHONE ENCOUNTER
Caller: Meli Best (POA)    Relationship: Emergency Contact    Best call back number: [phone]    What medication are you requesting:     What are your current symptoms: NAUSEA    If a prescription is needed, what is your preferred pharmacy and phone number: [id number] [place], [place] - [address] - [phone] [facility] [phone] FX     Additional notes: PATIENTS DAUGHTER WOULD LIKE A CALL REGARDING THIS.    A message was left that I would send in some Zofran.

## 2023-12-31 NOTE — PROGRESS NOTES
Strongyloides is negative  Possible celiac  Please bring him in for comprehensive celiac panel  Follow-up with Dr. Oren Reed 6 to 8 weeks

## 2024-01-03 ENCOUNTER — TELEPHONE (OUTPATIENT)
Dept: GASTROENTEROLOGY | Facility: CLINIC | Age: 84
End: 2024-01-03
Payer: MEDICARE

## 2024-01-03 DIAGNOSIS — R19.7 DIARRHEA, UNSPECIFIED TYPE: Primary | ICD-10-CM

## 2024-01-03 NOTE — TELEPHONE ENCOUNTER
----- Message from See Valdes MD sent at 12/31/2023  9:16 AM EST -----  Strongyloides is negative  Possible celiac  Please bring him in for comprehensive celiac panel  Follow-up with Dr. Oren Reed 6 to 8 weeks

## 2024-01-03 NOTE — TELEPHONE ENCOUNTER
Patient called. Advised as per Dr. Valdes's note. He verb understanding. He states he will need to call back to schedule his lab and f/u appointments.   Celiac panel order placed. Await providers signature.

## 2024-01-04 ENCOUNTER — READMISSION MANAGEMENT (OUTPATIENT)
Dept: CALL CENTER | Facility: HOSPITAL | Age: 84
End: 2024-01-04
Payer: MEDICARE

## 2024-01-04 NOTE — OUTREACH NOTE
Medical Week 2 Survey      Flowsheet Row Responses   [facility] patient discharged from? [place]   Does the patient have one of the following disease processes/diagnoses(primary or secondary)? Other   Week 2 attempt successful? Yes   Call start time 1623   Discharge diagnosis Diarrhea, gastroenteritis, severe malnutrition, major depression, anemia, benign essential HTN, hyperlipidemia. Colonoscopy, upper GI endoscopy   Call end time 1632   Person spoke with today (if not patient) and relationship daughter, Meli (POA)   Meds reviewed with patient/caregiver? Yes   Does the patient have all medications ordered at discharge? Yes   Is the patient taking all medications as directed (includes completed medication regime)? No   What is preventing the patient from taking all medications as directed? Desires to consult PCP first  [Daughter reports that patient will not take changed meds unless PCP advised him to do so.]   Does the patient have a primary care provider?  Yes   Does the patient have an appointment with their PCP within 7 days of discharge? Yes   Comments regarding PCP Follow up with Ernie Panda PCP. Patient was scheduled for PCP appt on 12/29, it has been rescheduled.   Has the patient kept scheduled appointments due by today? No   What is preventing the patient from keeping their appointments? --  [Patient was not able to go to PCP appt on scheduled day, it has been rescheduled for 1/10]   Nursing Interventions Educated on importance of keeping appointment   What is the Home health agency?  [facility]   Has home health visited the patient within 72 hours of discharge? No  [Daughter reports patient has not been seen by HH. She states that she was not contacted, patient may have been contacted and refused. She denies need for HH at this time.]   Psychosocial issues? No   Did the patient receive a copy of their discharge instructions? Yes   Nursing interventions Reviewed instructions with patient   [daughter]   What is the patient's perception of their health status since discharge? Improving  [Reports patient eating, showing improvement.]   Is the patient/caregiver able to teach back signs and symptoms related to disease process for when to call PCP? Yes   If the patient is a current smoker, are they able to teach back resources for cessation? Not a smoker   Week 2 Call Completed? Yes   Revoked No further contact(revokes)-requires comment   Is the patient interested in additional calls from an ambulatory ? No   Would this patient benefit from a Referral to [facility] Social Work? No   Graduated/Revoked comments Declines need for further f/u calls.   Call end time 9312            FELIPE SIEGEL - Registered Nurse

## 2024-01-04 NOTE — OUTREACH NOTE
Medical Week 2 Survey      Flowsheet Row Responses   [facility], [place], operated by [facility] patient discharged from? [facility]   Does the patient have one of the following disease processes/diagnoses(primary or secondary)? Other   Week 2 attempt successful? Yes   Call start time 1623   Discharge diagnosis Diarrhea, gastroenteritis, severe malnutrition, major depression, anemia, benign essential HTN, hyperlipidemia. Colonoscopy, upper GI endoscopy   Person spoke with today (if not patient) and relationship daughter, Meli (POA)   Meds reviewed with patient/caregiver? Yes   Does the patient have all medications ordered at discharge? Yes   Is the patient taking all medications as directed (includes completed medication regime)? No   What is preventing the patient from taking all medications as directed? Desires to consult PCP first  [Daughter reports that patient will not take changed meds unless PCP advised him to do so.]   Does the patient have a primary care provider?  Yes   Does the patient have an appointment with their PCP within 7 days of discharge? Yes   Comments regarding PCP Follow up with Ernie Panda PCP. Patient was scheduled for PCP appt on 12/29, it has been rescheduled.   Has the patient kept scheduled appointments due by today? No   What is preventing the patient from keeping their appointments? --  [Patient was not able to go to PCP appt on scheduled day, it has been rescheduled for 1/10]   Nursing Interventions Educated on importance of keeping appointment   What is the Home health agency?  [facility]   Has home health visited the patient within 72 hours of discharge? No  [Daughter reports patient has not been seen by HH. She states that she was not contacted, patient may have been contacted and refused. She denies need for HH at this time.]   Psychosocial issues? No   Did the patient receive a copy of their discharge instructions? Yes   Nursing interventions Reviewed instructions with patient  [daughter]   What is the  patient's perception of their health status since discharge? Improving  [Reports patient eating, showing improvement.]   Is the patient/caregiver able to teach back signs and symptoms related to disease process for when to call PCP? Yes   If the patient is a current smoker, are they able to teach back resources for cessation? Not a smoker   Week 2 Call Completed? Yes   Revoked No further contact(revokes)-requires comment   Is the patient interested in additional calls from an ambulatory ? No   Would this patient benefit from a Referral to [facility] Social Work? No   Graduated/Revoked comments Declines need for further f/u calls.            FELIPE SIEGEL - Registered Nurse

## 2024-01-10 ENCOUNTER — OFFICE VISIT (OUTPATIENT)
Dept: FAMILY MEDICINE CLINIC | Facility: CLINIC | Age: 84
End: 2024-01-10
Payer: MEDICARE

## 2024-01-10 VITALS
HEART RATE: 84 BPM | OXYGEN SATURATION: 100 % | SYSTOLIC BLOOD PRESSURE: 120 MMHG | HEIGHT: 70 IN | DIASTOLIC BLOOD PRESSURE: 58 MMHG | RESPIRATION RATE: 16 BRPM | WEIGHT: 165 LBS | BODY MASS INDEX: 23.62 KG/M2

## 2024-01-10 DIAGNOSIS — I95.89 HYPOTENSION DUE TO HYPOVOLEMIA: ICD-10-CM

## 2024-01-10 DIAGNOSIS — F32.9 REACTIVE DEPRESSION: ICD-10-CM

## 2024-01-10 DIAGNOSIS — R91.1 LUNG NODULE: ICD-10-CM

## 2024-01-10 DIAGNOSIS — E86.1 HYPOTENSION DUE TO HYPOVOLEMIA: ICD-10-CM

## 2024-01-10 DIAGNOSIS — K52.9 GASTROENTERITIS: Primary | ICD-10-CM

## 2024-01-10 PROCEDURE — 99214 OFFICE O/P EST MOD 30 MIN: CPT | Performed by: INTERNAL MEDICINE

## 2024-01-10 PROCEDURE — 3074F SYST BP LT 130 MM HG: CPT | Performed by: INTERNAL MEDICINE

## 2024-01-10 PROCEDURE — 3078F DIAST BP <80 MM HG: CPT | Performed by: INTERNAL MEDICINE

## 2024-01-10 RX ORDER — MIRTAZAPINE 30 MG/1
30 TABLET, FILM COATED ORAL NIGHTLY
Qty: 30 TABLET | Refills: 5 | Status: SHIPPED | OUTPATIENT
Start: 2024-01-10

## 2024-01-10 NOTE — PROGRESS NOTES
Subjective chief complaint is follow-up after hospitalization depression  Jewel Best is a 83 y.o. male.     DepressionPatient is not experiencing: shortness of breath.     Jewel is here today for follow-up.  He was admitted to [facility] on 17 December.  He had been having severe gastrointestinal symptoms and was dehydrated with a low blood pressure.  His CT scan of the abdomen showed changes consistent with gastroenteritis.  His stool specimens ended up growing a Sapovirus.  His lisinopril was held.  They did do an EGD which showed a 10 cm hiatal hernia as well as some esophagitis and gastritis.  He is now on some pantoprazole that will likely be a short-term medicine.  The psychiatrist saw him and increased his mirtazapine to 30 mg nightly but he has not been taking that.  I did encourage him to go ahead and do so.  He did have a CT scan of the chest which showed some pulmonary nodules that do require follow-up.    The following portions of the patient's history were reviewed and updated as appropriate: allergies, current medications, and problem list.    Review of Systems   Constitutional:  Negative for chills and fever.   Respiratory:  Negative for chest tightness and shortness of breath.    Gastrointestinal:  Negative for blood in stool, diarrhea, nausea and vomiting.     Objective   Physical Exam  Vitals and nursing note reviewed.   Constitutional:       Appearance: Normal appearance.   Cardiovascular:      Rate and Rhythm: Normal rate and regular rhythm.   Pulmonary:      Effort: Pulmonary effort is normal.      Breath sounds: No wheezing or rales.   Abdominal:      Tenderness: There is no abdominal tenderness. There is no guarding or rebound.   Neurological:      Mental Status: He is alert.       Assessment & Plan   Diagnoses and all orders for this visit:    1. Gastroenteritis (Primary)    2. Lung nodule  -     CT Chest Without Contrast Diagnostic; Future    3. Reactive depression    4. Hypotension  due to hypovolemia    Other orders  -     mirtazapine (REMERON) 30 MG tablet; Take 1 tablet by mouth Every Night.  Dispense: 30 tablet; Refill: 5      Jewel is here today for follow-up.  We are going to have him remain on the 30 mg of mirtazapine and I have sent a prescription for that.  We have settled on a 6-month follow-up for the lung nodule.  The gastroenteritis seems to have resolved.  For now we are going to have him continue to hold the lisinopril we will see him back in 3 months to check his blood pressure

## 2024-02-21 ENCOUNTER — PATIENT OUTREACH (OUTPATIENT)
Dept: CASE MANAGEMENT | Facility: OTHER | Age: 84
End: 2024-02-21
Payer: MEDICARE

## 2024-02-21 NOTE — OUTREACH NOTE
Patient Outreach    AMBULATORY CASE MANAGEMENT NOTE    Name and Relationship of Patient/Support Person: Jewel Bets W - Self    Call placed to patient for monthly outreach. Patients answers with yes or no. Does states he has a cold. Unable to initiate in conversation when called. Patient left with [facility] number for issues but will close.         Renee WILCOX  Ambulatory Case Management    2/21/2024, 13:38 EST

## 2024-03-15 ENCOUNTER — TELEPHONE (OUTPATIENT)
Dept: GASTROENTEROLOGY | Facility: CLINIC | Age: 84
End: 2024-03-15
Payer: MEDICARE

## 2024-03-15 NOTE — TELEPHONE ENCOUNTER
CALLED AND SPOKE WITH THE PT ABOUT DOING LABS.  HE DECLINED TO SCHEDULE THE APPT.  HE SAID THAT HE DIDN'T WANT TO DO IT.

## 2024-04-10 ENCOUNTER — OFFICE VISIT (OUTPATIENT)
Dept: FAMILY MEDICINE CLINIC | Facility: CLINIC | Age: 84
End: 2024-04-10
Payer: MEDICARE

## 2024-04-10 VITALS
SYSTOLIC BLOOD PRESSURE: 124 MMHG | RESPIRATION RATE: 16 BRPM | HEIGHT: 70 IN | OXYGEN SATURATION: 95 % | WEIGHT: 173 LBS | DIASTOLIC BLOOD PRESSURE: 70 MMHG | BODY MASS INDEX: 24.77 KG/M2 | HEART RATE: 84 BPM

## 2024-04-10 DIAGNOSIS — F34.1 PERSISTENT DEPRESSIVE DISORDER: ICD-10-CM

## 2024-04-10 DIAGNOSIS — D50.9 IRON DEFICIENCY ANEMIA, UNSPECIFIED IRON DEFICIENCY ANEMIA TYPE: ICD-10-CM

## 2024-04-10 DIAGNOSIS — E83.51 HYPOCALCEMIA: ICD-10-CM

## 2024-04-10 DIAGNOSIS — E87.6 HYPOKALEMIA: ICD-10-CM

## 2024-04-10 DIAGNOSIS — I10 BENIGN ESSENTIAL HYPERTENSION: Primary | ICD-10-CM

## 2024-04-10 PROCEDURE — 3078F DIAST BP <80 MM HG: CPT | Performed by: INTERNAL MEDICINE

## 2024-04-10 PROCEDURE — 3074F SYST BP LT 130 MM HG: CPT | Performed by: INTERNAL MEDICINE

## 2024-04-10 PROCEDURE — 99214 OFFICE O/P EST MOD 30 MIN: CPT | Performed by: INTERNAL MEDICINE

## 2024-04-10 NOTE — PROGRESS NOTES
Subjective chief complaint is follow-up on blood pressure  Jewel Best is a 83 y.o. male.     History of Present Illness Jewel is here today for follow-up on his blood pressure.  He seems to be doing fine without the lisinopril.  I did retake it 118/68.  He still seems somewhat morose.  He is not necessarily suicidal.  He has thoughts of being better off dead.  He does not know if the 30 mg of mirtazapine is helping.  We have done several referrals in the past to psychiatrist and he is not kept the appointments.  I did advise that we have tried numerous medications on him in the past including Wellbutrin, E citalopram, sertraline, Trintellix, and none of these have seemed to have been beneficial or he has not given them time enough to be beneficial.  I therefore again advised that he see a psychiatrist to better manage medications.    The following portions of the patient's history were reviewed and updated as appropriate: allergies, current medications, and problem list.    Review of Systems   Psychiatric/Behavioral:  Positive for decreased concentration, dysphoric mood and depressed mood. Negative for suicidal ideas.      Objective   Physical Exam  Vitals and nursing note reviewed.   Constitutional:       Appearance: Normal appearance.   Neck:      Vascular: No carotid bruit.   Cardiovascular:      Rate and Rhythm: Normal rate and regular rhythm.   Pulmonary:      Effort: Pulmonary effort is normal.      Breath sounds: No wheezing, rhonchi or rales.   Neurological:      Mental Status: He is alert.   Psychiatric:         Attention and Perception: Attention normal.         Mood and Affect: Affect is flat.         Speech: Speech normal.         Behavior: Behavior is cooperative.         Thought Content: Thought content normal. Thought content is not paranoid. Thought content does not include homicidal or suicidal ideation.       Assessment & Plan   Diagnoses and all orders for this visit:    1. Benign essential  hypertension (Primary)  -     CBC & Differential    2. Iron deficiency anemia, unspecified iron deficiency anemia type  -     CBC & Differential  -     Iron Profile  -     Ferritin  -     Vitamin B12  -     Folate    3. Hypokalemia  -     Comprehensive Metabolic Panel    4. Hypocalcemia  -     Comprehensive Metabolic Panel    5. Persistent depressive disorder  -     Ambulatory Referral to Psychiatry    Jewel is here today for follow-up.  We did discuss referral to a psychiatrist.  He did have some anemia at his hospitalization and we need to follow-up on that as well as a low potassium and calcium.

## 2024-06-17 ENCOUNTER — TELEPHONE (OUTPATIENT)
Dept: FAMILY MEDICINE CLINIC | Facility: CLINIC | Age: 84
End: 2024-06-17
Payer: MEDICARE

## 2024-06-17 ENCOUNTER — NURSE TRIAGE (OUTPATIENT)
Dept: CALL CENTER | Facility: HOSPITAL | Age: 84
End: 2024-06-17
Payer: MEDICARE

## 2024-06-17 DIAGNOSIS — F32.A DEPRESSION, UNSPECIFIED DEPRESSION TYPE: Primary | ICD-10-CM

## 2024-06-17 NOTE — TELEPHONE ENCOUNTER
RN Gladys Melton called the office stating that the  mutual patient  is depressed and that he is just tired. I stayed on the phone with the patient to make sure that he wouldn't inflict self harm and we also, sent the police department for a safety check, the covering provider Dr Hoang assisted me  with. The safety report number is [id number].    It appears he was evaluated at [facility]

## 2024-06-17 NOTE — TELEPHONE ENCOUNTER
"HUB call transferred.  Wants to kill himself. Feeling suicidal.    Everything is wrong.  Denies pain.    Has felt this way quite some time.    Just tired of living    Says he is depressed,  \"well I don't even know\"   Just  wants to die.    Stopped taking antidepressants a couple of months ago. Dr. Panda unaware he stopped taking anti depressants. He said they weren't dong any good anyway.    Refuses to let me call daughter. He says his daughter is going to call him tonight after she gets off work   Says he is being evicted from his apartment July 31 and does not know why    He does not want his daughter notified.  He does not want me to call EMS     He says he is OK for now.    Warm transfer to Dr Panda office, spoke with Bc Yancey with Dr Panda' office, has called someone to check on him    He says he is not gong to kill himself right now.    Dr. Hoang, Dr Panda partner in the office came to phone.  Dr. Hoang said someone will be coming to talk with him.    Police entered home.    Reason for Disposition   [1] Patient is not threatening suicide now BUT [2] has a suicide PLAN (e.g., overdose, gunshot) and ACCESS (e.g., collecting pills, gun in house)    Additional Information   Negative: Patient attempted suicide   Negative: Patient is threatening suicide now   Negative: Violent behavior, or threatening to physically hurt or kill someone   Negative: [1] Patient is very confused (disoriented, slurred speech) AND [2] no other adult (e.g., friend or family member) available   Negative: [1] Difficult to awaken or acting very confused (disoriented, slurred speech) AND [2] new-onset   Negative: Sounds like a life-threatening emergency to the triager   Negative: Depression is main symptom and is not threatening suicide   Negative: Threatening to physically hurt or kill someone   Negative: [1] Depression symptoms (sadness, hopelessness, decreased energy) AND [2] unable to do any normal activities (e.g., " "self care, school, work; in comparison to baseline).   Negative: Patient sounds very sick or weak to the triager    Answer Assessment - Initial Assessment Questions  1. MAIN CONCERN: \"What happened that made you call today?\"      Just tired of feeling like he wants to commit suicide  2. RISK OF HARM - SUICIDAL IDEATION:  \"Do you ever have thoughts of hurting or killing yourself?\"  (e.g., yes, no, no but preoccupation with thoughts about death)    - WISH TO BE DEAD:  \"Have you wished you were dead or wished you could go to sleep and not wake up?\"    - INTENT:  \"Have you had any thoughts of hurting or killing yourself?\" (e.g., yes, no, N/A) If Yes, ask: \"Are you having these thoughts about killing yourself right NOW?\"    - PLAN: \"Have you thought about how you might do this?\" \"Do you have a specific plan for how you would do this?\" (e.g., gun, knife, overdose, no plan, N/A)    - ACCESS: If yes to PLAN, \"Do you have access to *No Answer*?\" (e.g., pills, gun in house, knife in kitchen)   Does not want to kill him but does have access to gun Says he has a gun in the home   3. RISK OF HARM - SUICIDE ATTEMPT: \"Have you tried to harm yourself recently?\" If Yes, ask: When was this?\"  \"What type of harm was tried?\"    denies  4. RISK OF HARM - SUICIDAL BEHAVIOR: \"Have you ever done anything, started to do anything, or prepared to do anything to end your life?\" (e.g., collected pills, bought a gun, wrote a suicide note, cut yourself, started but changed your mind)     denies  5. EVENTS AND STRESSORS: \"Has there been any new stress or recent changes in your life?\" (e.g., death of loved one, homelessness, negative event, relationship breakup, work)     Received an eviction notice.  6. FUNCTIONAL IMPAIRMENT: \"How have things been going for you overall? Have you had more difficulty than usual doing your normal daily activities?\"  (e.g., better, same, worse; self-care, school, work, interactions)      Received an eviction letter " "from the relator  7. SUPPORT: \"Who is with you now?\" \"Who do you live with?\" \"Do you have family or friends who you can talk to?\"       Lives alone     has a daughter  8. THERAPIST: \"Do you have a counselor or therapist? What is their name?\"  no  9. ALCOHOL USE OR SUBSTANCE USE (DRUG USE): \"Do you drink alcohol or use any illegal drugs (or prescription drugs in ways other than prescribed)?\"      *No Answer*  10. 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 PREGNANCY or POSTPARTUM: \"Is there any chance you are pregnant?\" \"When was your last menstrual period?\" \"Were you recently pregnant?\" \"When did you give birth?\"  na    Protocols used: Suicide Concerns-ADULT-AH    "

## 2024-06-17 NOTE — TELEPHONE ENCOUNTER
Note from Bc- RN Gladys Melton called the office stating that the  mutual patient  is depressed and that he is just tired of living. I stayed on the phone with the patient to make sure that he wouldn't inflict self harm and we also, sent the police department for a safety check, the covering provider Dr Hoang assisted me  with. The safety report number is [id number].          It appears he was evaluated at [facility]

## 2024-06-18 ENCOUNTER — TELEPHONE (OUTPATIENT)
Dept: FAMILY MEDICINE CLINIC | Facility: CLINIC | Age: 84
End: 2024-06-18
Payer: MEDICARE

## 2024-06-19 ENCOUNTER — TELEPHONE (OUTPATIENT)
Dept: FAMILY MEDICINE CLINIC | Facility: CLINIC | Age: 84
End: 2024-06-19
Payer: MEDICARE

## 2024-06-19 NOTE — TELEPHONE ENCOUNTER
This patient who is normally seen by Dr. Oakley who is out today relates that he was thinking of doing harm to himself.  He relates he had had a previous history of depression and had been given Remeron which she has stopped taking several months ago.  He relates that he just did not feel that it was helping.  He did not inform Dr. Rock.  He relates that he had not had thoughts of harming himself or anyone else in the past.  After talking with him more I found that the patient recently had received from his landlord a notice of none removal of his apartment/house.  He initially denied that this was significant but later revealed that this had put him in a quandary about where he would go.  I had the conversation which was begun initially by the  transferred to me as I continue to engage with the patient until [place] [employer] could arrive and evaluate.  Upon the arrival of [place] [employer] the call was transferred back to the /[place].  [place] [employer] apparently convinced the patient of the need for further evaluation and he was taken to the emergency room.

## 2024-06-19 NOTE — TELEPHONE ENCOUNTER
Hub to relay:    Please tell patient to schedule with Psych -Dr. Finnegan at [phone].    Thank you!

## 2024-08-05 ENCOUNTER — TELEPHONE (OUTPATIENT)
Dept: FAMILY MEDICINE CLINIC | Facility: CLINIC | Age: 84
End: 2024-08-05
Payer: MEDICARE

## 2024-08-05 ENCOUNTER — NURSE TRIAGE (OUTPATIENT)
Dept: CALL CENTER | Facility: HOSPITAL | Age: 84
End: 2024-08-05
Payer: MEDICARE

## 2024-08-05 NOTE — TELEPHONE ENCOUNTER
Patient called back and explained that provider is seeing patient and he just has got to see his message I asked was this something he nneds to be ssen in the ER or Urgent care he said he did not know what did I think and explained to patient that me or the MA s can not give care recommendations it has to come from his pcp I told him I would send another  message and as soon as pcp could call he would but if it gets worst to go to er    The patient is not febrile. He is not having abdominal pain. He just took magnesium citrate this am. I advised Miralax and senekot. If he starts having pain he needs to go to the emergency room otherwise he can set up an office visit here.

## 2024-08-05 NOTE — TELEPHONE ENCOUNTER
Caller: Jewel Best    Relationship: Self    Best call back number: [phone]     What is the best time to reach you: ANY    Who are you requesting to speak with (clinical staff, provider,  specific staff member): CLINICAL    Do you know the name of the person who called: JEWEL    What was the call regarding: PATIENT CALLED STATING HE HAS NOT HAD A BOWEL MOVEMENT IN 8 DAYS AND WOULD LIKE TO TALK TO SOMEONE.    Is it okay if the provider responds through MyChart:

## 2024-08-05 NOTE — TELEPHONE ENCOUNTER
"Caller has not had a BM in 8 days.  He drank a bottle of Mag Citrate this am.  He has not passed any gas, has had no stool.  Reason for Disposition   Patient sounds very sick or weak to the triager    Additional Information   Negative: [1] Abdomen pain is main symptom AND [2] male   Negative: [1] Abdomen pain is main symptom AND [2] adult female   Negative: Rectal bleeding or blood in stool is main symptom   Negative: Rectal pain or itching is main symptom   Negative: Constipation in a cancer patient who is currently (or recently) receiving chemotherapy or radiation therapy, or cancer patient who has metastatic or end-stage cancer and is receiving palliative care   Negative: [1] Vomiting AND [2] contains bile (green color)    Answer Assessment - Initial Assessment Questions  1. STOOL PATTERN OR FREQUENCY: \"How often do you have a bowel movement (BM)?\"  (Normal range: 3 times a day to every 3 days)  \"When was your last BM?\"        Have not had a BM in 8 days  2. STRAINING: \"Do you have to strain to have a BM?\"       Am now  3. RECTAL PAIN: \"Does your rectum hurt when the stool comes out?\" If Yes, ask: \"Do you have hemorrhoids? How bad is the pain?\"  (Scale 1-10; or mild, moderate, severe)      no  4. STOOL COMPOSITION: \"Are the stools hard?\"       No bm for 8 days  5. BLOOD ON STOOLS: \"Has there been any blood on the toilet tissue or on the surface of the BM?\" If Yes, ask: \"When was the last time?\"      na  6. CHRONIC CONSTIPATION: \"Is this a new problem for you?\"  If No, ask: \"How long have you had this problem?\" (days, weeks, months)       no  7. CHANGES IN DIET OR HYDRATION: \"Have there been any recent changes in your diet?\" \"How much fluids are you drinking on a daily basis?\"  \"How much have you had to drink today?\"      no  8. MEDICINES: \"Have you been taking any new medicines?\" \"Are you taking any narcotic pain medicines?\" (e.g., Dilaudid, morphine, Percocet, Vicodin)      no  9. LAXATIVES: \"Have you been using " "any stool softeners, laxatives, or enemas?\"  If Yes, ask \"What, how often, and when was the last time?\"      Mag citrate with no results  10. ACTIVITY:  \"How much walking do you do every day?\"  \"Has your activity level decreased in the past week?\"         Not a lot  11. CAUSE: \"What do you think is causing the constipation?\"         Not sure  12. OTHER SYMPTOMS: \"Do you have any other symptoms?\" (e.g., abdomen pain, bloating, fever, vomiting)        no  13. MEDICAL HISTORY: \"Do you have a history of hemorrhoids, rectal fissures, or rectal surgery or rectal abscess?\"          na  14. PREGNANCY: \"Is there any chance you are pregnant?\" \"When was your last menstrual period?\"        na    Protocols used: Constipation-ADULT-AH    "

## 2025-02-26 ENCOUNTER — INPATIENT HOSPITAL (OUTPATIENT)
Dept: URBAN - METROPOLITAN AREA HOSPITAL 107 | Facility: HOSPITAL | Age: 85
End: 2025-02-26
Payer: MEDICARE

## 2025-02-26 DIAGNOSIS — R94.5 ABNORMAL RESULTS OF LIVER FUNCTION STUDIES: ICD-10-CM

## 2025-02-26 DIAGNOSIS — R11.2 NAUSEA WITH VOMITING, UNSPECIFIED: ICD-10-CM

## 2025-02-26 DIAGNOSIS — R10.13 EPIGASTRIC PAIN: ICD-10-CM

## 2025-02-26 DIAGNOSIS — R10.32 LEFT LOWER QUADRANT PAIN: ICD-10-CM

## 2025-02-26 DIAGNOSIS — K44.9 DIAPHRAGMATIC HERNIA WITHOUT OBSTRUCTION OR GANGRENE: ICD-10-CM

## 2025-02-26 PROCEDURE — 99223 1ST HOSP IP/OBS HIGH 75: CPT | Mod: FS | Performed by: PHYSICIAN ASSISTANT

## 2025-02-27 ENCOUNTER — INPATIENT HOSPITAL (OUTPATIENT)
Dept: URBAN - METROPOLITAN AREA HOSPITAL 107 | Facility: HOSPITAL | Age: 85
End: 2025-02-27
Payer: MEDICARE

## 2025-02-27 DIAGNOSIS — R93.3 ABNORMAL FINDINGS ON DIAGNOSTIC IMAGING OF OTHER PARTS OF DI: ICD-10-CM

## 2025-02-27 DIAGNOSIS — K25.9 GASTRIC ULCER, UNSPECIFIED AS ACUTE OR CHRONIC, WITHOUT HEMO: ICD-10-CM

## 2025-02-27 DIAGNOSIS — K92.0 HEMATEMESIS: ICD-10-CM

## 2025-02-27 DIAGNOSIS — K31.89 OTHER DISEASES OF STOMACH AND DUODENUM: ICD-10-CM

## 2025-02-27 DIAGNOSIS — K44.9 DIAPHRAGMATIC HERNIA WITHOUT OBSTRUCTION OR GANGRENE: ICD-10-CM

## 2025-02-27 DIAGNOSIS — K21.9 GASTRO-ESOPHAGEAL REFLUX DISEASE WITHOUT ESOPHAGITIS: ICD-10-CM

## 2025-02-27 DIAGNOSIS — R10.13 EPIGASTRIC PAIN: ICD-10-CM

## 2025-02-27 DIAGNOSIS — K22.89 OTHER SPECIFIED DISEASE OF ESOPHAGUS: ICD-10-CM

## 2025-02-27 PROCEDURE — 43239 EGD BIOPSY SINGLE/MULTIPLE: CPT | Performed by: INTERNAL MEDICINE

## 2025-02-28 ENCOUNTER — INPATIENT HOSPITAL (OUTPATIENT)
Dept: URBAN - METROPOLITAN AREA HOSPITAL 107 | Facility: HOSPITAL | Age: 85
End: 2025-02-28
Payer: MEDICARE

## 2025-02-28 DIAGNOSIS — R11.2 NAUSEA WITH VOMITING, UNSPECIFIED: ICD-10-CM

## 2025-02-28 DIAGNOSIS — R10.9 UNSPECIFIED ABDOMINAL PAIN: ICD-10-CM

## 2025-02-28 PROCEDURE — 99232 SBSQ HOSP IP/OBS MODERATE 35: CPT | Performed by: PHYSICIAN ASSISTANT

## (undated) DEVICE — CANN O2 ETCO2 FITS ALL CONN CO2 SMPL A/ 7IN DISP LF

## (undated) DEVICE — TUBING, SUCTION, 1/4" X 10', STRAIGHT: Brand: MEDLINE

## (undated) DEVICE — FRCP BX RADJAW4 NDL 2.8 240CM LG OG BX40

## (undated) DEVICE — SENSR O2 OXIMAX FNGR A/ 18IN NONSTR

## (undated) DEVICE — ADAPT CLN BIOGUARD AIR/H2O DISP

## (undated) DEVICE — LN SMPL CO2 SHTRM SD STREAM W/M LUER

## (undated) DEVICE — KT ORCA ORCAPOD DISP STRL

## (undated) DEVICE — BLCK/BITE BLOX W/DENTL/RIM W/STRAP 54F